# Patient Record
Sex: FEMALE | Race: OTHER | Employment: UNEMPLOYED
[De-identification: names, ages, dates, MRNs, and addresses within clinical notes are randomized per-mention and may not be internally consistent; named-entity substitution may affect disease eponyms.]

---

## 2017-01-31 RX ORDER — ALPRAZOLAM 0.25 MG/1
TABLET ORAL
Qty: 10 TABLET | Refills: 0 | Status: SHIPPED | OUTPATIENT
Start: 2017-01-31 | End: 2017-02-23 | Stop reason: SDUPTHER

## 2017-02-09 ENCOUNTER — TELEPHONE (OUTPATIENT)
Dept: INTERNAL MEDICINE | Facility: CLINIC | Age: 52
End: 2017-02-09

## 2017-02-09 ENCOUNTER — HOSPITAL ENCOUNTER (OUTPATIENT)
Dept: PAIN MANAGEMENT | Age: 52
Discharge: HOME | End: 2017-02-09
Payer: MEDICAID

## 2017-02-09 VITALS
TEMPERATURE: 98.1 F | DIASTOLIC BLOOD PRESSURE: 86 MMHG | WEIGHT: 207 LBS | BODY MASS INDEX: 36.68 KG/M2 | SYSTOLIC BLOOD PRESSURE: 156 MMHG | RESPIRATION RATE: 18 BRPM | HEIGHT: 63 IN | HEART RATE: 75 BPM

## 2017-02-09 DIAGNOSIS — G47.33 OSA (OBSTRUCTIVE SLEEP APNEA): Primary | ICD-10-CM

## 2017-02-09 DIAGNOSIS — G47.8 UNREFRESHED BY SLEEP: ICD-10-CM

## 2017-02-09 DIAGNOSIS — R53.83 FATIGUE, UNSPECIFIED TYPE: ICD-10-CM

## 2017-02-09 DIAGNOSIS — G89.4 PAIN SYNDROME, CHRONIC: Primary | ICD-10-CM

## 2017-02-09 PROCEDURE — 99214 OFFICE O/P EST MOD 30 MIN: CPT

## 2017-02-09 PROCEDURE — 80307 DRUG TEST PRSMV CHEM ANLYZR: CPT

## 2017-02-09 RX ORDER — GABAPENTIN 300 MG/1
300 CAPSULE ORAL 3 TIMES DAILY
Qty: 90 CAPSULE | Refills: 0
Start: 2017-02-19 | End: 2017-03-01 | Stop reason: SDUPTHER

## 2017-02-09 RX ORDER — GABAPENTIN 300 MG/1
300 CAPSULE ORAL 3 TIMES DAILY
Qty: 90 CAPSULE | Refills: 0 | Status: SHIPPED | OUTPATIENT
Start: 2017-02-09 | End: 2017-02-09 | Stop reason: SDUPTHER

## 2017-02-09 RX ORDER — GABAPENTIN 100 MG/1
300 CAPSULE ORAL EVERY 8 HOURS SCHEDULED
Qty: 90 CAPSULE | Refills: 0 | Status: SHIPPED | OUTPATIENT
Start: 2017-02-18 | End: 2017-02-09 | Stop reason: DRUGHIGH

## 2017-02-09 ASSESSMENT — PAIN SCALES - GENERAL: PAINLEVEL_OUTOF10: 9

## 2017-02-09 ASSESSMENT — PAIN DESCRIPTION - FREQUENCY: FREQUENCY: CONTINUOUS

## 2017-02-09 ASSESSMENT — PAIN DESCRIPTION - ORIENTATION: ORIENTATION: RIGHT;LEFT

## 2017-02-09 ASSESSMENT — PAIN DESCRIPTION - LOCATION: LOCATION: FOOT

## 2017-02-09 ASSESSMENT — PAIN DESCRIPTION - PROGRESSION: CLINICAL_PROGRESSION: NOT CHANGED

## 2017-02-09 ASSESSMENT — PAIN DESCRIPTION - PAIN TYPE: TYPE: CHRONIC PAIN

## 2017-02-11 LAB
6-ACETYLMORPHINE, UR: NOT DETECTED
7-AMINOCLONAZEPAM, URINE: NOT DETECTED
ALPHA-OH-ALPRAZ, URINE: NOT DETECTED
ALPRAZOLAM, URINE: NOT DETECTED
AMPHETAMINES, URINE: NOT DETECTED
BARBITURATES, URINE: NOT DETECTED
BENZOYLECGONINE, UR: NOT DETECTED
BUPRENORPHINE URINE: NOT DETECTED
CARISOPRODOL, UR: NOT DETECTED
CLONAZEPAM, URINE: NOT DETECTED
CODEINE, URINE: NOT DETECTED
CREATININE URINE: 21.1 MG/DL (ref 20–400)
DIAZEPAM, URINE: NOT DETECTED
EER PAIN MGT DRUG PANEL, HIGH RES/EMIT U: NORMAL
ETHYL GLUCURONIDE UR: PRESENT
FENTANYL URINE: NOT DETECTED
HYDROCODONE, URINE: NOT DETECTED
HYDROMORPHONE, URINE: NOT DETECTED
LORAZEPAM, URINE: NOT DETECTED
MARIJUANA METAB, UR: NOT DETECTED
MDA, UR: NOT DETECTED
MDEA, EVE, UR: NOT DETECTED
MDMA URINE: NOT DETECTED
MEPERIDINE METAB, UR: NOT DETECTED
METHADONE, URINE: NOT DETECTED
METHAMPHETAMINE, URINE: NOT DETECTED
METHYLPHENIDATE: NOT DETECTED
MIDAZOLAM, URINE: NOT DETECTED
MORPHINE URINE: NOT DETECTED
NORBUPRENORPHINE, URINE: NOT DETECTED
NORDIAZEPAM, URINE: NOT DETECTED
NORFENTANYL, URINE: NOT DETECTED
NORHYDROCODONE, URINE: NOT DETECTED
NOROXYCODONE, URINE: NOT DETECTED
NOROXYMORPHONE, URINE: NOT DETECTED
OXAZEPAM, URINE: NOT DETECTED
OXYCODONE URINE: NOT DETECTED
OXYMORPHONE, URINE: NOT DETECTED
PAIN MGT DRUG PANEL, HI RES, UR: NORMAL
PCP,URINE: NOT DETECTED
PHENTERMINE, UR: NOT DETECTED
PROPOXYPHENE, URINE: NOT DETECTED
TAPENTADOL, URINE: NOT DETECTED
TAPENTADOL-O-SULFATE, URINE: NOT DETECTED
TEMAZEPAM, URINE: NOT DETECTED
TRAMADOL, URINE: PRESENT
ZOLPIDEM, URINE: NOT DETECTED

## 2017-02-23 ENCOUNTER — HOSPITAL ENCOUNTER (OUTPATIENT)
Age: 52
Discharge: HOME | End: 2017-02-23
Payer: MEDICAID

## 2017-02-23 ENCOUNTER — OFFICE VISIT (OUTPATIENT)
Dept: INTERNAL MEDICINE | Facility: CLINIC | Age: 52
End: 2017-02-23

## 2017-02-23 VITALS
DIASTOLIC BLOOD PRESSURE: 70 MMHG | WEIGHT: 206 LBS | HEART RATE: 95 BPM | RESPIRATION RATE: 18 BRPM | SYSTOLIC BLOOD PRESSURE: 124 MMHG | BODY MASS INDEX: 36.49 KG/M2

## 2017-02-23 DIAGNOSIS — E11.9 TYPE 2 DIABETES MELLITUS WITHOUT COMPLICATION, WITHOUT LONG-TERM CURRENT USE OF INSULIN (HCC): Primary | ICD-10-CM

## 2017-02-23 DIAGNOSIS — E78.00 PURE HYPERCHOLESTEROLEMIA: ICD-10-CM

## 2017-02-23 DIAGNOSIS — F41.9 ANXIETY: ICD-10-CM

## 2017-02-23 DIAGNOSIS — G89.4 PAIN SYNDROME, CHRONIC: ICD-10-CM

## 2017-02-23 PROCEDURE — 99213 OFFICE O/P EST LOW 20 MIN: CPT | Performed by: INTERNAL MEDICINE

## 2017-02-23 RX ORDER — HYDROCHLOROTHIAZIDE 25 MG/1
25 TABLET ORAL DAILY
Qty: 30 TABLET | Refills: 8 | Status: SHIPPED | OUTPATIENT
Start: 2017-02-23 | End: 2017-07-06 | Stop reason: SDUPTHER

## 2017-02-23 RX ORDER — ALPRAZOLAM 0.25 MG/1
TABLET ORAL
Qty: 10 TABLET | Refills: 0 | Status: SHIPPED | OUTPATIENT
Start: 2017-02-23 | End: 2017-04-04 | Stop reason: SDUPTHER

## 2017-02-23 ASSESSMENT — ENCOUNTER SYMPTOMS
RESPIRATORY NEGATIVE: 1
EYES NEGATIVE: 1
ALLERGIC/IMMUNOLOGIC NEGATIVE: 1
GASTROINTESTINAL NEGATIVE: 1

## 2017-03-01 ENCOUNTER — HOSPITAL ENCOUNTER (OUTPATIENT)
Dept: PAIN MANAGEMENT | Age: 52
Discharge: HOME | End: 2017-03-01
Payer: COMMERCIAL

## 2017-03-01 VITALS
RESPIRATION RATE: 16 BRPM | DIASTOLIC BLOOD PRESSURE: 89 MMHG | TEMPERATURE: 98.4 F | HEART RATE: 79 BPM | SYSTOLIC BLOOD PRESSURE: 139 MMHG

## 2017-03-01 DIAGNOSIS — E11.42 DIABETIC POLYNEUROPATHY ASSOCIATED WITH TYPE 2 DIABETES MELLITUS (HCC): ICD-10-CM

## 2017-03-01 DIAGNOSIS — G89.4 PAIN SYNDROME, CHRONIC: ICD-10-CM

## 2017-03-01 PROCEDURE — 99214 OFFICE O/P EST MOD 30 MIN: CPT

## 2017-03-01 RX ORDER — MELOXICAM 7.5 MG/1
7.5 TABLET ORAL 2 TIMES DAILY
Qty: 60 TABLET | Refills: 0 | Status: SHIPPED | OUTPATIENT
Start: 2017-03-01 | End: 2017-05-02 | Stop reason: SDUPTHER

## 2017-03-01 RX ORDER — TRAMADOL HYDROCHLORIDE 50 MG/1
50 TABLET ORAL EVERY 8 HOURS PRN
Qty: 42 TABLET | Refills: 0 | Status: SHIPPED | OUTPATIENT
Start: 2017-03-01 | End: 2017-03-31 | Stop reason: SDUPTHER

## 2017-03-01 RX ORDER — GABAPENTIN 300 MG/1
300 CAPSULE ORAL 3 TIMES DAILY
Qty: 90 CAPSULE | Refills: 0 | Status: SHIPPED | OUTPATIENT
Start: 2017-03-01 | End: 2017-05-02 | Stop reason: DRUGHIGH

## 2017-03-01 ASSESSMENT — PAIN SCALES - GENERAL: PAINLEVEL_OUTOF10: 7

## 2017-03-01 ASSESSMENT — PAIN DESCRIPTION - FREQUENCY: FREQUENCY: CONTINUOUS

## 2017-03-01 ASSESSMENT — PAIN DESCRIPTION - ORIENTATION: ORIENTATION: RIGHT;LEFT

## 2017-03-01 ASSESSMENT — PAIN DESCRIPTION - PAIN TYPE: TYPE: CHRONIC PAIN

## 2017-03-01 ASSESSMENT — PAIN DESCRIPTION - PROGRESSION: CLINICAL_PROGRESSION: NOT CHANGED

## 2017-03-01 ASSESSMENT — PAIN DESCRIPTION - LOCATION: LOCATION: FOOT

## 2017-03-01 ASSESSMENT — PAIN DESCRIPTION - ONSET: ONSET: ON-GOING

## 2017-03-07 RX ORDER — ALPRAZOLAM 0.25 MG/1
TABLET ORAL
Qty: 10 TABLET | Refills: 0 | OUTPATIENT
Start: 2017-03-07

## 2017-03-13 ENCOUNTER — TELEPHONE (OUTPATIENT)
Dept: INTERNAL MEDICINE | Age: 52
End: 2017-03-13

## 2017-03-13 ENCOUNTER — HOSPITAL ENCOUNTER (OUTPATIENT)
Dept: NEUROLOGY | Age: 52
Discharge: HOME | End: 2017-03-13
Payer: COMMERCIAL

## 2017-03-13 DIAGNOSIS — E11.42 DIABETIC POLYNEUROPATHY ASSOCIATED WITH TYPE 2 DIABETES MELLITUS (HCC): Primary | ICD-10-CM

## 2017-03-13 DIAGNOSIS — E11.42 DIABETIC POLYNEUROPATHY ASSOCIATED WITH TYPE 2 DIABETES MELLITUS (HCC): ICD-10-CM

## 2017-03-13 PROCEDURE — 95912 NRV CNDJ TEST 11-12 STUDIES: CPT | Performed by: PHYSICAL MEDICINE & REHABILITATION

## 2017-03-31 ENCOUNTER — HOSPITAL ENCOUNTER (OUTPATIENT)
Dept: PAIN MANAGEMENT | Age: 52
Discharge: HOME | End: 2017-03-31
Payer: COMMERCIAL

## 2017-03-31 DIAGNOSIS — G89.4 PAIN SYNDROME, CHRONIC: ICD-10-CM

## 2017-03-31 DIAGNOSIS — E11.42 DIABETIC POLYNEUROPATHY ASSOCIATED WITH TYPE 2 DIABETES MELLITUS (HCC): Primary | ICD-10-CM

## 2017-03-31 PROCEDURE — 99213 OFFICE O/P EST LOW 20 MIN: CPT

## 2017-03-31 PROCEDURE — 99214 OFFICE O/P EST MOD 30 MIN: CPT

## 2017-03-31 RX ORDER — OMEGA-3 FATTY ACIDS/FISH OIL 300-1000MG
1 CAPSULE ORAL DAILY PRN
Qty: 120 CAPSULE | Refills: 3 | Status: SHIPPED | OUTPATIENT
Start: 2017-03-31 | End: 2017-05-02 | Stop reason: SDUPTHER

## 2017-03-31 RX ORDER — TRAMADOL HYDROCHLORIDE 50 MG/1
50 TABLET ORAL EVERY 8 HOURS PRN
Qty: 42 TABLET | Refills: 0 | Status: SHIPPED | OUTPATIENT
Start: 2017-03-31 | End: 2017-03-31 | Stop reason: SDUPTHER

## 2017-03-31 RX ORDER — GABAPENTIN 400 MG/1
400 CAPSULE ORAL 3 TIMES DAILY
Qty: 90 CAPSULE | Refills: 0 | Status: SHIPPED | OUTPATIENT
Start: 2017-03-31 | End: 2017-05-02 | Stop reason: SDUPTHER

## 2017-03-31 RX ORDER — TRAMADOL HYDROCHLORIDE 50 MG/1
50 TABLET ORAL EVERY 8 HOURS PRN
Qty: 42 TABLET | Refills: 0 | Status: SHIPPED | OUTPATIENT
Start: 2017-04-14 | End: 2017-05-08 | Stop reason: SDUPTHER

## 2017-03-31 ASSESSMENT — ENCOUNTER SYMPTOMS
COUGH: 0
SHORTNESS OF BREATH: 0
CONSTIPATION: 0

## 2017-04-05 RX ORDER — ALPRAZOLAM 0.25 MG/1
TABLET ORAL
Qty: 10 TABLET | Refills: 0 | Status: SHIPPED | OUTPATIENT
Start: 2017-04-05 | End: 2017-05-02 | Stop reason: SDUPTHER

## 2017-04-25 ENCOUNTER — HOSPITAL ENCOUNTER (OUTPATIENT)
Dept: PAIN MANAGEMENT | Age: 52
Discharge: HOME | End: 2017-04-25
Payer: COMMERCIAL

## 2017-04-25 VITALS
RESPIRATION RATE: 16 BRPM | DIASTOLIC BLOOD PRESSURE: 95 MMHG | SYSTOLIC BLOOD PRESSURE: 153 MMHG | BODY MASS INDEX: 36.32 KG/M2 | WEIGHT: 205 LBS | HEART RATE: 78 BPM | TEMPERATURE: 98 F | HEIGHT: 63 IN

## 2017-04-25 PROCEDURE — 99214 OFFICE O/P EST MOD 30 MIN: CPT

## 2017-04-25 PROCEDURE — 99213 OFFICE O/P EST LOW 20 MIN: CPT

## 2017-04-25 ASSESSMENT — PAIN DESCRIPTION - PROGRESSION: CLINICAL_PROGRESSION: NOT CHANGED

## 2017-04-25 ASSESSMENT — PAIN SCALES - GENERAL: PAINLEVEL_OUTOF10: 8

## 2017-04-25 ASSESSMENT — PAIN DESCRIPTION - LOCATION: LOCATION: FOOT;HAND

## 2017-04-25 ASSESSMENT — PAIN DESCRIPTION - ORIENTATION: ORIENTATION: RIGHT;LEFT

## 2017-04-25 ASSESSMENT — PAIN DESCRIPTION - FREQUENCY: FREQUENCY: CONTINUOUS

## 2017-04-25 ASSESSMENT — PAIN DESCRIPTION - PAIN TYPE: TYPE: CHRONIC PAIN

## 2017-05-02 DIAGNOSIS — G89.4 PAIN SYNDROME, CHRONIC: ICD-10-CM

## 2017-05-02 RX ORDER — GABAPENTIN 300 MG/1
300 CAPSULE ORAL 3 TIMES DAILY
Qty: 90 CAPSULE | Refills: 0 | Status: CANCELLED | OUTPATIENT
Start: 2017-05-02 | End: 2017-06-01

## 2017-05-04 RX ORDER — GABAPENTIN 400 MG/1
400 CAPSULE ORAL 3 TIMES DAILY
Qty: 90 CAPSULE | Refills: 0 | Status: SHIPPED | OUTPATIENT
Start: 2017-05-04 | End: 2017-05-26 | Stop reason: SDUPTHER

## 2017-05-04 RX ORDER — OMEGA-3 FATTY ACIDS/FISH OIL 300-1000MG
1 CAPSULE ORAL DAILY PRN
Qty: 120 CAPSULE | Refills: 3 | Status: SHIPPED | OUTPATIENT
Start: 2017-05-04 | End: 2017-06-20

## 2017-05-04 RX ORDER — MELOXICAM 7.5 MG/1
7.5 TABLET ORAL 2 TIMES DAILY
Qty: 60 TABLET | Refills: 0 | Status: SHIPPED | OUTPATIENT
Start: 2017-05-04 | End: 2017-05-26

## 2017-05-04 RX ORDER — ALPRAZOLAM 0.25 MG/1
TABLET ORAL
Qty: 10 TABLET | Refills: 0 | Status: SHIPPED | OUTPATIENT
Start: 2017-05-04 | End: 2017-06-06 | Stop reason: SDUPTHER

## 2017-05-15 ENCOUNTER — TELEPHONE (OUTPATIENT)
Dept: INTERNAL MEDICINE | Age: 52
End: 2017-05-15

## 2017-05-16 RX ORDER — BENZONATATE 100 MG/1
100 CAPSULE ORAL 3 TIMES DAILY PRN
Qty: 25 CAPSULE | Refills: 1 | Status: SHIPPED | OUTPATIENT
Start: 2017-05-16 | End: 2017-05-23

## 2017-05-19 ENCOUNTER — HOSPITAL ENCOUNTER (EMERGENCY)
Age: 52
Discharge: HOME | End: 2017-05-19
Payer: COMMERCIAL

## 2017-05-19 ENCOUNTER — APPOINTMENT (OUTPATIENT)
Dept: GENERAL RADIOLOGY | Age: 52
End: 2017-05-19
Payer: COMMERCIAL

## 2017-05-19 VITALS
SYSTOLIC BLOOD PRESSURE: 151 MMHG | HEIGHT: 64 IN | TEMPERATURE: 99.8 F | WEIGHT: 220 LBS | BODY MASS INDEX: 37.56 KG/M2 | DIASTOLIC BLOOD PRESSURE: 82 MMHG | HEART RATE: 87 BPM | RESPIRATION RATE: 20 BRPM | OXYGEN SATURATION: 98 %

## 2017-05-19 DIAGNOSIS — J20.9 BRONCHITIS WITH BRONCHOSPASM: Primary | ICD-10-CM

## 2017-05-19 PROCEDURE — 94640 AIRWAY INHALATION TREATMENT: CPT

## 2017-05-19 PROCEDURE — 71020 XR CHEST STANDARD TWO VW: CPT

## 2017-05-19 PROCEDURE — 6360000002 HC RX W HCPCS

## 2017-05-19 PROCEDURE — 99285 EMERGENCY DEPT VISIT HI MDM: CPT

## 2017-05-19 RX ORDER — ALBUTEROL SULFATE 90 UG/1
2 AEROSOL, METERED RESPIRATORY (INHALATION)
Status: DISCONTINUED | OUTPATIENT
Start: 2017-05-19 | End: 2017-05-19 | Stop reason: HOSPADM

## 2017-05-19 RX ORDER — PREDNISONE 20 MG/1
20 TABLET ORAL 2 TIMES DAILY
Qty: 10 TABLET | Refills: 0 | Status: SHIPPED | OUTPATIENT
Start: 2017-05-19 | End: 2017-05-24

## 2017-05-19 RX ORDER — DEXTROMETHORPHAN HYDROBROMIDE AND PROMETHAZINE HYDROCHLORIDE 15; 6.25 MG/5ML; MG/5ML
5 SYRUP ORAL 4 TIMES DAILY PRN
Qty: 180 ML | Refills: 0 | Status: SHIPPED | OUTPATIENT
Start: 2017-05-19 | End: 2017-05-26

## 2017-05-19 RX ORDER — AZITHROMYCIN 250 MG/1
TABLET, FILM COATED ORAL
Qty: 1 PACKET | Refills: 0 | Status: SHIPPED | OUTPATIENT
Start: 2017-05-19 | End: 2017-05-29

## 2017-05-19 RX ORDER — IPRATROPIUM BROMIDE AND ALBUTEROL SULFATE 2.5; .5 MG/3ML; MG/3ML
1 SOLUTION RESPIRATORY (INHALATION)
Status: DISCONTINUED | OUTPATIENT
Start: 2017-05-19 | End: 2017-05-19 | Stop reason: HOSPADM

## 2017-05-19 RX ORDER — ALBUTEROL SULFATE 2.5 MG/3ML
5 SOLUTION RESPIRATORY (INHALATION)
Status: DISCONTINUED | OUTPATIENT
Start: 2017-05-19 | End: 2017-05-19 | Stop reason: HOSPADM

## 2017-05-19 RX ADMIN — ALBUTEROL SULFATE 5 MG: 5 SOLUTION RESPIRATORY (INHALATION) at 08:47

## 2017-05-19 ASSESSMENT — ENCOUNTER SYMPTOMS
GASTROINTESTINAL NEGATIVE: 1
SHORTNESS OF BREATH: 1
SORE THROAT: 1
SINUS PRESSURE: 1
COUGH: 1

## 2017-05-19 ASSESSMENT — PAIN SCALES - GENERAL: PAINLEVEL_OUTOF10: 10

## 2017-05-25 ENCOUNTER — OFFICE VISIT (OUTPATIENT)
Dept: INTERNAL MEDICINE | Age: 52
End: 2017-05-25
Payer: COMMERCIAL

## 2017-05-25 ENCOUNTER — OFFICE VISIT (OUTPATIENT)
Dept: PODIATRY | Age: 52
End: 2017-05-25
Payer: COMMERCIAL

## 2017-05-25 VITALS
WEIGHT: 212 LBS | DIASTOLIC BLOOD PRESSURE: 95 MMHG | TEMPERATURE: 98.2 F | BODY MASS INDEX: 36.39 KG/M2 | RESPIRATION RATE: 20 BRPM | HEART RATE: 91 BPM | SYSTOLIC BLOOD PRESSURE: 165 MMHG

## 2017-05-25 VITALS
SYSTOLIC BLOOD PRESSURE: 111 MMHG | WEIGHT: 190 LBS | DIASTOLIC BLOOD PRESSURE: 74 MMHG | BODY MASS INDEX: 32.44 KG/M2 | HEIGHT: 64 IN | HEART RATE: 91 BPM

## 2017-05-25 DIAGNOSIS — E11.9 TYPE 2 DIABETES MELLITUS WITHOUT COMPLICATION, WITHOUT LONG-TERM CURRENT USE OF INSULIN (HCC): Primary | ICD-10-CM

## 2017-05-25 DIAGNOSIS — M67.449 MUCOUS CYST OF FINGER: ICD-10-CM

## 2017-05-25 DIAGNOSIS — B35.1 ONYCHOMYCOSIS OF TOENAIL: Primary | ICD-10-CM

## 2017-05-25 DIAGNOSIS — E11.9 TYPE 2 DIABETES MELLITUS WITHOUT COMPLICATION, WITHOUT LONG-TERM CURRENT USE OF INSULIN (HCC): ICD-10-CM

## 2017-05-25 DIAGNOSIS — J41.0 SIMPLE CHRONIC BRONCHITIS (HCC): ICD-10-CM

## 2017-05-25 DIAGNOSIS — E78.00 PURE HYPERCHOLESTEROLEMIA: ICD-10-CM

## 2017-05-25 DIAGNOSIS — J20.8 ACUTE BRONCHITIS DUE TO OTHER SPECIFIED ORGANISMS: ICD-10-CM

## 2017-05-25 DIAGNOSIS — G89.4 PAIN SYNDROME, CHRONIC: ICD-10-CM

## 2017-05-25 DIAGNOSIS — M79.674 PAIN OF TOES OF BOTH FEET: ICD-10-CM

## 2017-05-25 DIAGNOSIS — M79.675 PAIN OF TOES OF BOTH FEET: ICD-10-CM

## 2017-05-25 PROCEDURE — 99203 OFFICE O/P NEW LOW 30 MIN: CPT | Performed by: PODIATRIST

## 2017-05-25 PROCEDURE — 99214 OFFICE O/P EST MOD 30 MIN: CPT | Performed by: INTERNAL MEDICINE

## 2017-05-25 PROCEDURE — 11721 DEBRIDE NAIL 6 OR MORE: CPT | Performed by: PODIATRIST

## 2017-05-25 RX ORDER — AMLODIPINE BESYLATE 5 MG/1
5 TABLET ORAL DAILY
Qty: 30 TABLET | Refills: 6 | Status: SHIPPED | OUTPATIENT
Start: 2017-05-25 | End: 2017-11-30 | Stop reason: SDUPTHER

## 2017-05-25 RX ORDER — VARENICLINE TARTRATE 1 MG/1
1 TABLET, FILM COATED ORAL 2 TIMES DAILY
Qty: 60 TABLET | Refills: 3 | Status: SHIPPED | OUTPATIENT
Start: 2017-05-25 | End: 2017-11-30

## 2017-05-25 RX ORDER — GUAIFENESIN 600 MG/1
600 TABLET, EXTENDED RELEASE ORAL 2 TIMES DAILY
Qty: 25 TABLET | Refills: 0 | Status: SHIPPED | OUTPATIENT
Start: 2017-05-25 | End: 2017-07-14 | Stop reason: ALTCHOICE

## 2017-05-25 RX ORDER — VARENICLINE TARTRATE 25 MG
KIT ORAL
Qty: 1 EACH | Refills: 0 | Status: SHIPPED | OUTPATIENT
Start: 2017-05-25 | End: 2017-11-30 | Stop reason: SDUPTHER

## 2017-05-25 ASSESSMENT — ENCOUNTER SYMPTOMS
ALLERGIC/IMMUNOLOGIC NEGATIVE: 1
BACK PAIN: 0
COLOR CHANGE: 0
DIARRHEA: 0
SHORTNESS OF BREATH: 0
GASTROINTESTINAL NEGATIVE: 1
SHORTNESS OF BREATH: 1
NAUSEA: 0
COUGH: 1
BACK PAIN: 1

## 2017-05-26 ENCOUNTER — HOSPITAL ENCOUNTER (OUTPATIENT)
Dept: PAIN MANAGEMENT | Age: 52
Discharge: HOME | End: 2017-05-26
Payer: COMMERCIAL

## 2017-05-26 VITALS
DIASTOLIC BLOOD PRESSURE: 89 MMHG | HEIGHT: 64 IN | TEMPERATURE: 98 F | WEIGHT: 190 LBS | BODY MASS INDEX: 32.44 KG/M2 | RESPIRATION RATE: 16 BRPM | SYSTOLIC BLOOD PRESSURE: 134 MMHG | HEART RATE: 83 BPM

## 2017-05-26 PROCEDURE — 99214 OFFICE O/P EST MOD 30 MIN: CPT

## 2017-05-26 PROCEDURE — 99213 OFFICE O/P EST LOW 20 MIN: CPT

## 2017-05-26 ASSESSMENT — ENCOUNTER SYMPTOMS
SUSPICIOUS LESIONS: 0
EYE DISCHARGE: 0
BLURRED VISION: 0
UNUSUAL HAIR DISTRIBUTION: 0

## 2017-05-30 RX ORDER — GABAPENTIN 400 MG/1
400 CAPSULE ORAL 3 TIMES DAILY
Qty: 90 CAPSULE | Refills: 0 | Status: SHIPPED | OUTPATIENT
Start: 2017-05-30 | End: 2017-06-20 | Stop reason: SDUPTHER

## 2017-06-07 RX ORDER — ALPRAZOLAM 0.25 MG/1
TABLET ORAL
Qty: 10 TABLET | Refills: 0 | Status: SHIPPED | OUTPATIENT
Start: 2017-06-07 | End: 2017-07-06 | Stop reason: SDUPTHER

## 2017-06-12 DIAGNOSIS — R29.898 SMALL FINGER: Primary | ICD-10-CM

## 2017-06-12 DIAGNOSIS — M67.441 DIGITAL MUCOUS CYST OF FINGER OF RIGHT HAND: ICD-10-CM

## 2017-06-14 ENCOUNTER — OFFICE VISIT (OUTPATIENT)
Dept: ORTHOPEDIC SURGERY | Age: 52
End: 2017-06-14
Payer: COMMERCIAL

## 2017-06-14 VITALS — BODY MASS INDEX: 32.44 KG/M2 | WEIGHT: 190.04 LBS | HEIGHT: 64 IN

## 2017-06-14 DIAGNOSIS — M15.1 HEBERDEN NODE: Primary | ICD-10-CM

## 2017-06-14 DIAGNOSIS — M19.142 POST-TRAUMATIC OSTEOARTHRITIS OF LEFT HAND: ICD-10-CM

## 2017-06-14 DIAGNOSIS — M67.40 MUCOID CYST OF JOINT: ICD-10-CM

## 2017-06-14 PROCEDURE — 99203 OFFICE O/P NEW LOW 30 MIN: CPT | Performed by: STUDENT IN AN ORGANIZED HEALTH CARE EDUCATION/TRAINING PROGRAM

## 2017-06-14 ASSESSMENT — ENCOUNTER SYMPTOMS
EYE PAIN: 0
WHEEZING: 0
ABDOMINAL DISTENTION: 0
VOMITING: 0
ABDOMINAL PAIN: 0
NAUSEA: 0
COLOR CHANGE: 0
COUGH: 0
SHORTNESS OF BREATH: 0

## 2017-06-15 RX ORDER — TRAMADOL HYDROCHLORIDE 50 MG/1
50 TABLET ORAL EVERY 8 HOURS PRN
Qty: 42 TABLET | Refills: 0 | OUTPATIENT
Start: 2017-06-15 | End: 2017-06-29

## 2017-06-20 ENCOUNTER — HOSPITAL ENCOUNTER (OUTPATIENT)
Age: 52
Setting detail: SPECIMEN
Discharge: HOME | End: 2017-06-20
Payer: COMMERCIAL

## 2017-06-20 ENCOUNTER — OFFICE VISIT (OUTPATIENT)
Dept: INTERNAL MEDICINE | Age: 52
End: 2017-06-20
Payer: COMMERCIAL

## 2017-06-20 VITALS
HEIGHT: 64 IN | WEIGHT: 202.2 LBS | DIASTOLIC BLOOD PRESSURE: 91 MMHG | TEMPERATURE: 98.6 F | SYSTOLIC BLOOD PRESSURE: 146 MMHG | BODY MASS INDEX: 34.52 KG/M2 | HEART RATE: 87 BPM

## 2017-06-20 DIAGNOSIS — E78.00 PURE HYPERCHOLESTEROLEMIA: ICD-10-CM

## 2017-06-20 DIAGNOSIS — J18.9 PNEUMONIA OF RIGHT MIDDLE LOBE DUE TO INFECTIOUS ORGANISM: ICD-10-CM

## 2017-06-20 DIAGNOSIS — G89.29 CHRONIC PAIN OF RIGHT ANKLE: Primary | ICD-10-CM

## 2017-06-20 DIAGNOSIS — E11.9 TYPE 2 DIABETES MELLITUS WITHOUT COMPLICATION, WITHOUT LONG-TERM CURRENT USE OF INSULIN (HCC): ICD-10-CM

## 2017-06-20 DIAGNOSIS — M25.571 CHRONIC PAIN OF RIGHT ANKLE: Primary | ICD-10-CM

## 2017-06-20 LAB — RHEUMATOID FACTOR: <10 IU/ML

## 2017-06-20 PROCEDURE — 86038 ANTINUCLEAR ANTIBODIES: CPT

## 2017-06-20 PROCEDURE — 36415 COLL VENOUS BLD VENIPUNCTURE: CPT

## 2017-06-20 PROCEDURE — 86431 RHEUMATOID FACTOR QUANT: CPT

## 2017-06-20 PROCEDURE — 99214 OFFICE O/P EST MOD 30 MIN: CPT | Performed by: INTERNAL MEDICINE

## 2017-06-20 RX ORDER — AZITHROMYCIN 250 MG/1
TABLET, FILM COATED ORAL
Qty: 1 PACKET | Refills: 0 | Status: SHIPPED | OUTPATIENT
Start: 2017-06-20 | End: 2017-06-30

## 2017-06-20 RX ORDER — CEPHALEXIN 500 MG/1
500 CAPSULE ORAL
COMMUNITY
Start: 2017-06-16 | End: 2017-06-26

## 2017-06-20 RX ORDER — GUAIFENESIN/DEXTROMETHORPHAN 100-10MG/5
5 SYRUP ORAL 3 TIMES DAILY PRN
Qty: 120 ML | Refills: 0 | Status: SHIPPED | OUTPATIENT
Start: 2017-06-20 | End: 2017-06-30

## 2017-06-20 RX ORDER — GABAPENTIN 400 MG/1
400 CAPSULE ORAL 3 TIMES DAILY
Qty: 90 CAPSULE | Refills: 0 | Status: SHIPPED | OUTPATIENT
Start: 2017-06-20 | End: 2017-07-14 | Stop reason: SDUPTHER

## 2017-06-20 ASSESSMENT — ENCOUNTER SYMPTOMS
GASTROINTESTINAL NEGATIVE: 1
EYES NEGATIVE: 1
ALLERGIC/IMMUNOLOGIC NEGATIVE: 1
SHORTNESS OF BREATH: 1
COUGH: 1

## 2017-06-21 LAB — ANTI-NUCLEAR ANTIBODY (ANA): NEGATIVE

## 2017-06-29 ENCOUNTER — TELEPHONE (OUTPATIENT)
Dept: INTERNAL MEDICINE | Age: 52
End: 2017-06-29

## 2017-06-29 DIAGNOSIS — E11.42 DIABETIC POLYNEUROPATHY ASSOCIATED WITH TYPE 2 DIABETES MELLITUS (HCC): ICD-10-CM

## 2017-06-29 DIAGNOSIS — E11.9 TYPE 2 DIABETES MELLITUS WITHOUT COMPLICATION, WITHOUT LONG-TERM CURRENT USE OF INSULIN (HCC): Primary | ICD-10-CM

## 2017-07-07 RX ORDER — ALPRAZOLAM 0.25 MG/1
TABLET ORAL
Qty: 10 TABLET | Refills: 0 | Status: SHIPPED | OUTPATIENT
Start: 2017-07-07 | End: 2017-08-14 | Stop reason: SDUPTHER

## 2017-07-07 RX ORDER — HYDROCHLOROTHIAZIDE 25 MG/1
25 TABLET ORAL DAILY
Qty: 30 TABLET | Refills: 3 | Status: SHIPPED | OUTPATIENT
Start: 2017-07-07 | End: 2017-11-30 | Stop reason: SDUPTHER

## 2017-07-14 ENCOUNTER — OFFICE VISIT (OUTPATIENT)
Dept: INTERNAL MEDICINE | Age: 52
End: 2017-07-14
Payer: COMMERCIAL

## 2017-07-14 ENCOUNTER — HOSPITAL ENCOUNTER (OUTPATIENT)
Age: 52
Setting detail: SPECIMEN
Discharge: HOME | End: 2017-07-14
Payer: COMMERCIAL

## 2017-07-14 VITALS
BODY MASS INDEX: 37.25 KG/M2 | DIASTOLIC BLOOD PRESSURE: 86 MMHG | SYSTOLIC BLOOD PRESSURE: 143 MMHG | WEIGHT: 218.2 LBS | HEIGHT: 64 IN | HEART RATE: 92 BPM

## 2017-07-14 DIAGNOSIS — Z13.9 SCREENING: ICD-10-CM

## 2017-07-14 DIAGNOSIS — Z23 NEED FOR PNEUMOCOCCAL VACCINATION: ICD-10-CM

## 2017-07-14 DIAGNOSIS — Z12.31 SCREENING MAMMOGRAM, ENCOUNTER FOR: ICD-10-CM

## 2017-07-14 DIAGNOSIS — E78.00 PURE HYPERCHOLESTEROLEMIA: ICD-10-CM

## 2017-07-14 DIAGNOSIS — E11.9 TYPE 2 DIABETES MELLITUS WITHOUT COMPLICATION, WITHOUT LONG-TERM CURRENT USE OF INSULIN (HCC): Primary | ICD-10-CM

## 2017-07-14 DIAGNOSIS — E11.9 TYPE 2 DIABETES MELLITUS WITHOUT COMPLICATION, WITHOUT LONG-TERM CURRENT USE OF INSULIN (HCC): ICD-10-CM

## 2017-07-14 DIAGNOSIS — G89.4 PAIN SYNDROME, CHRONIC: ICD-10-CM

## 2017-07-14 LAB
ANION GAP SERPL CALCULATED.3IONS-SCNC: 14 MMOL/L (ref 9–17)
BUN BLDV-MCNC: 16 MG/DL (ref 6–20)
BUN/CREAT BLD: ABNORMAL (ref 9–20)
CALCIUM SERPL-MCNC: 9.8 MG/DL (ref 8.6–10.4)
CHLORIDE BLD-SCNC: 98 MMOL/L (ref 98–107)
CO2: 27 MMOL/L (ref 20–31)
CONTROL: NORMAL
CREAT SERPL-MCNC: 0.85 MG/DL (ref 0.5–0.9)
ESTIMATED AVERAGE GLUCOSE: 171 MG/DL
GFR AFRICAN AMERICAN: >60 ML/MIN
GFR NON-AFRICAN AMERICAN: >60 ML/MIN
GFR SERPL CREATININE-BSD FRML MDRD: ABNORMAL ML/MIN/{1.73_M2}
GFR SERPL CREATININE-BSD FRML MDRD: ABNORMAL ML/MIN/{1.73_M2}
GLUCOSE BLD-MCNC: 148 MG/DL (ref 70–99)
HBA1C MFR BLD: 7.6 % (ref 4–6)
HEMOCCULT STL QL: NORMAL
POTASSIUM SERPL-SCNC: 4.4 MMOL/L (ref 3.7–5.3)
SODIUM BLD-SCNC: 139 MMOL/L (ref 135–144)

## 2017-07-14 PROCEDURE — 99214 OFFICE O/P EST MOD 30 MIN: CPT | Performed by: INTERNAL MEDICINE

## 2017-07-14 PROCEDURE — 80048 BASIC METABOLIC PNL TOTAL CA: CPT

## 2017-07-14 PROCEDURE — 83036 HEMOGLOBIN GLYCOSYLATED A1C: CPT

## 2017-07-14 PROCEDURE — 90471 IMMUNIZATION ADMIN: CPT | Performed by: INTERNAL MEDICINE

## 2017-07-14 PROCEDURE — 36415 COLL VENOUS BLD VENIPUNCTURE: CPT

## 2017-07-14 PROCEDURE — 90732 PPSV23 VACC 2 YRS+ SUBQ/IM: CPT | Performed by: INTERNAL MEDICINE

## 2017-07-14 RX ORDER — GABAPENTIN 300 MG/1
600 CAPSULE ORAL 3 TIMES DAILY
Qty: 180 CAPSULE | Refills: 3 | Status: SHIPPED | OUTPATIENT
Start: 2017-07-14 | End: 2017-09-12 | Stop reason: DRUGHIGH

## 2017-07-14 ASSESSMENT — ENCOUNTER SYMPTOMS
GASTROINTESTINAL NEGATIVE: 1
BACK PAIN: 1
RESPIRATORY NEGATIVE: 1
EYES NEGATIVE: 1
ALLERGIC/IMMUNOLOGIC NEGATIVE: 1

## 2017-07-19 DIAGNOSIS — Z12.11 COLON CANCER SCREENING: Primary | ICD-10-CM

## 2017-07-19 PROCEDURE — 82274 ASSAY TEST FOR BLOOD FECAL: CPT | Performed by: INTERNAL MEDICINE

## 2017-07-24 RX ORDER — GABAPENTIN 600 MG/1
600 TABLET ORAL 3 TIMES DAILY
Qty: 90 TABLET | Refills: 3 | Status: SHIPPED | OUTPATIENT
Start: 2017-07-24 | End: 2017-09-11 | Stop reason: SDUPTHER

## 2017-07-31 ENCOUNTER — NURSE ONLY (OUTPATIENT)
Dept: PODIATRY | Age: 52
End: 2017-07-31

## 2017-07-31 DIAGNOSIS — M21.969 FOOT DEFORMITY, UNSPECIFIED LATERALITY: ICD-10-CM

## 2017-07-31 DIAGNOSIS — E11.9 TYPE 2 DIABETES MELLITUS WITHOUT COMPLICATION, WITHOUT LONG-TERM CURRENT USE OF INSULIN (HCC): Primary | ICD-10-CM

## 2017-08-15 ENCOUNTER — PROCEDURE VISIT (OUTPATIENT)
Dept: PHYSICAL MEDICINE AND REHAB | Age: 52
End: 2017-08-15
Payer: COMMERCIAL

## 2017-08-15 DIAGNOSIS — G56.01 CARPAL TUNNEL SYNDROME OF RIGHT WRIST: Primary | ICD-10-CM

## 2017-08-15 PROCEDURE — 95910 NRV CNDJ TEST 7-8 STUDIES: CPT | Performed by: PHYSICAL MEDICINE & REHABILITATION

## 2017-08-15 PROCEDURE — 95886 MUSC TEST DONE W/N TEST COMP: CPT | Performed by: PHYSICAL MEDICINE & REHABILITATION

## 2017-08-15 RX ORDER — ALPRAZOLAM 0.25 MG/1
TABLET ORAL
Qty: 10 TABLET | Refills: 0 | Status: SHIPPED | OUTPATIENT
Start: 2017-08-15 | End: 2017-09-09 | Stop reason: SDUPTHER

## 2017-09-11 RX ORDER — GABAPENTIN 800 MG/1
800 TABLET ORAL 3 TIMES DAILY
Qty: 90 TABLET | Refills: 1 | Status: SHIPPED | OUTPATIENT
Start: 2017-09-11 | End: 2017-11-02 | Stop reason: SDUPTHER

## 2017-09-11 RX ORDER — ALPRAZOLAM 0.25 MG/1
TABLET ORAL
Qty: 10 TABLET | Refills: 0 | OUTPATIENT
Start: 2017-09-11 | End: 2017-10-21 | Stop reason: SDUPTHER

## 2017-09-26 ENCOUNTER — OFFICE VISIT (OUTPATIENT)
Dept: PODIATRY | Age: 52
End: 2017-09-26
Payer: COMMERCIAL

## 2017-09-26 VITALS
HEIGHT: 63 IN | WEIGHT: 207 LBS | HEART RATE: 79 BPM | BODY MASS INDEX: 36.68 KG/M2 | SYSTOLIC BLOOD PRESSURE: 142 MMHG | DIASTOLIC BLOOD PRESSURE: 90 MMHG

## 2017-09-26 DIAGNOSIS — M25.571 ACUTE RIGHT ANKLE PAIN: ICD-10-CM

## 2017-09-26 DIAGNOSIS — S93.421A SPRAIN, ANKLE JOINT, MEDIAL, RIGHT, INITIAL ENCOUNTER: ICD-10-CM

## 2017-09-26 DIAGNOSIS — R60.0 EDEMA OF RIGHT FOOT: ICD-10-CM

## 2017-09-26 DIAGNOSIS — M79.671 RIGHT FOOT PAIN: ICD-10-CM

## 2017-09-26 DIAGNOSIS — M21.969 FOOT DEFORMITY, UNSPECIFIED LATERALITY: ICD-10-CM

## 2017-09-26 DIAGNOSIS — M25.471 EDEMA OF RIGHT ANKLE: ICD-10-CM

## 2017-09-26 DIAGNOSIS — E11.9 TYPE 2 DIABETES MELLITUS WITHOUT COMPLICATION, WITHOUT LONG-TERM CURRENT USE OF INSULIN (HCC): Primary | ICD-10-CM

## 2017-09-26 DIAGNOSIS — S93.601A SPRAIN OF FOOT, RIGHT, INITIAL ENCOUNTER: ICD-10-CM

## 2017-09-26 PROCEDURE — L4360 PNEUMAT WALKING BOOT PRE CST: HCPCS | Performed by: PODIATRIST

## 2017-09-26 PROCEDURE — A5513 MULTI DEN INSERT CUSTOM MOLD: HCPCS | Performed by: PODIATRIST

## 2017-09-26 PROCEDURE — 73620 X-RAY EXAM OF FOOT: CPT | Performed by: PODIATRIST

## 2017-09-26 PROCEDURE — A5500 DIAB SHOE FOR DENSITY INSERT: HCPCS | Performed by: PODIATRIST

## 2017-09-26 PROCEDURE — 99213 OFFICE O/P EST LOW 20 MIN: CPT | Performed by: PODIATRIST

## 2017-09-26 PROCEDURE — 73610 X-RAY EXAM OF ANKLE: CPT | Performed by: PODIATRIST

## 2017-09-26 RX ORDER — IBUPROFEN 800 MG/1
TABLET ORAL
COMMUNITY
Start: 2017-09-08 | End: 2017-11-30 | Stop reason: ALTCHOICE

## 2017-09-26 ASSESSMENT — ENCOUNTER SYMPTOMS
BACK PAIN: 0
SHORTNESS OF BREATH: 0
DIARRHEA: 0
COLOR CHANGE: 0
NAUSEA: 0

## 2017-09-27 ENCOUNTER — HOSPITAL ENCOUNTER (OUTPATIENT)
Age: 52
Setting detail: SPECIMEN
Discharge: HOME | End: 2017-09-27
Payer: COMMERCIAL

## 2017-09-27 DIAGNOSIS — E11.9 TYPE 2 DIABETES MELLITUS WITHOUT COMPLICATION, WITHOUT LONG-TERM CURRENT USE OF INSULIN (HCC): ICD-10-CM

## 2017-09-27 DIAGNOSIS — E78.00 PURE HYPERCHOLESTEROLEMIA: ICD-10-CM

## 2017-09-27 LAB
CHOLESTEROL/HDL RATIO: 8.4
CHOLESTEROL: 252 MG/DL
ESTIMATED AVERAGE GLUCOSE: 157 MG/DL
HBA1C MFR BLD: 7.1 % (ref 4–6)
HDLC SERPL-MCNC: 30 MG/DL
LDL CHOLESTEROL DIRECT: 128 MG/DL
LDL CHOLESTEROL: ABNORMAL MG/DL (ref 0–130)
TRIGL SERPL-MCNC: 538 MG/DL
VLDLC SERPL CALC-MCNC: ABNORMAL MG/DL (ref 1–30)

## 2017-09-27 PROCEDURE — 36415 COLL VENOUS BLD VENIPUNCTURE: CPT

## 2017-09-27 PROCEDURE — 83036 HEMOGLOBIN GLYCOSYLATED A1C: CPT

## 2017-09-27 PROCEDURE — 80061 LIPID PANEL: CPT

## 2017-09-27 PROCEDURE — 83721 ASSAY OF BLOOD LIPOPROTEIN: CPT

## 2017-10-03 ENCOUNTER — TELEPHONE (OUTPATIENT)
Dept: INTERNAL MEDICINE | Age: 52
End: 2017-10-03

## 2017-10-05 ENCOUNTER — OFFICE VISIT (OUTPATIENT)
Dept: PODIATRY | Age: 52
End: 2017-10-05
Payer: COMMERCIAL

## 2017-10-05 VITALS
HEART RATE: 85 BPM | BODY MASS INDEX: 36.68 KG/M2 | DIASTOLIC BLOOD PRESSURE: 67 MMHG | WEIGHT: 207 LBS | SYSTOLIC BLOOD PRESSURE: 112 MMHG | HEIGHT: 63 IN

## 2017-10-05 DIAGNOSIS — M76.821 POSTERIOR TIBIAL TENDINITIS, RIGHT: ICD-10-CM

## 2017-10-05 DIAGNOSIS — S93.601D SPRAIN OF FOOT, RIGHT, SUBSEQUENT ENCOUNTER: ICD-10-CM

## 2017-10-05 DIAGNOSIS — M25.471 EDEMA OF RIGHT ANKLE: ICD-10-CM

## 2017-10-05 DIAGNOSIS — M79.671 RIGHT FOOT PAIN: ICD-10-CM

## 2017-10-05 DIAGNOSIS — M25.571 ACUTE RIGHT ANKLE PAIN: ICD-10-CM

## 2017-10-05 DIAGNOSIS — R60.0 EDEMA OF RIGHT FOOT: ICD-10-CM

## 2017-10-05 DIAGNOSIS — M66.871 TIBIALIS POSTERIOR TENDON TEAR, NONTRAUMATIC, RIGHT: Primary | ICD-10-CM

## 2017-10-05 DIAGNOSIS — S93.421D: ICD-10-CM

## 2017-10-05 PROCEDURE — 99213 OFFICE O/P EST LOW 20 MIN: CPT | Performed by: PODIATRIST

## 2017-10-05 NOTE — MR AVS SNAPSHOT
After Visit Summary             Shad Jackson   10/5/2017 3:15 PM   Office Visit    Description:  Female : 1965   Provider:  Joselo Alston DPM   Department:  [facility]              Your Follow-Up and Future Appointments         Below is a list of your follow-up and future appointments. This may not be a complete list as you may have made appointments directly with providers that we are not aware of or your providers may have made some for you. Please call your providers to confirm appointments. It is important to keep your appointments. Please bring your current insurance card, photo ID, co-pay, and all medication bottles to your appointment. If self-pay, payment is expected at the time of service. Your To-Do List     Future Appointments Provider Department Dept Phone    10/20/2017 2:15 PM [facility] MAMMO RM [address] ST Mammography [phone]    NO PERFUME, POWDER, OR DEODORANT UNDER ARMS OR BREAST AREA. DEPARTMENT CONTACT: [id number]    REPORT TO REGISTRATION 30MIN. PRIOR      ***MAMM JAM PATIENTS CAN REPORT DIRECTLY TO  AND DO NOT GO TO REGISTRATION***    2017 1:00 PM RADHA Jose/ [address] [phone]    Please bring your photo ID, insurance card, co-pay and medication bottles with you to your appointment Appointments must be kept or cancelled within 24 hours    2017 3:45 PM Joselo Alston DPM [facility] [phone]    Please arrive 15 minutes prior to appointment, bring photo ID and insurance card.     2017 10:00 AM Erna Warren MD 1000 36  [phone]         Information from Your Visit        Department     Name Address Phone Fax    [facility] [address]  [address] [facility]  [address] [place] [address] [phone]      Vital Signs     Blood Pressure Pulse Height Weight Last Menstrual Period Body Mass Index Date Of Birth Sex Race Ethnicity Preferred Language    1965 Female Bi-Racial / English      Problem List as of 10/5/2017  Date Reviewed: 9/26/2017                Hyperlipidemia    DM (diabetes mellitus) ([facility]    Obesity, morbid ([facility]    Pain syndrome, chronic    Heberden node    Mucoid cyst of joint    Post-traumatic osteoarthritis of left hand    Polyneuritis, diabetic ([facility]    Anxiety      Immunizations as of 10/5/2017     Name Date    PPD Test 11/9/2016, 11/1/2016, 10/19/2016    Pneumococcal Polysaccharide ([id number]) 7/14/2017      Preventive Care        Date Due    Tetanus Combination Vaccine (1 - Tdap) 6/28/1986    Urine Check For Kidney Problems 4/30/2014    Eye Exam By An Eye Doctor 4/14/2017    Mammograms are recommended every 2 years for low/average risk patients aged 48 - 69, and every year for high risk patients per updated national guidelines. However these guidelines can be individualized by your provider. 4/14/2017    Yearly Flu Vaccine (1) 9/1/2017    Colon Cancer Stool Test 7/14/2018    Hemoglobin A1C (Test For Long-Term Glucose Control) 9/27/2018    Cholesterol Screening 9/27/2018    Diabetic Foot Exam 10/5/2018    Pap Smear 10/4/2021            MyChart Signup           Our records indicate that you have declined MyChart signup.

## 2017-10-06 ASSESSMENT — ENCOUNTER SYMPTOMS
BACK PAIN: 0
COLOR CHANGE: 0
SHORTNESS OF BREATH: 0
DIARRHEA: 0
NAUSEA: 0

## 2017-10-06 NOTE — PROGRESS NOTES
Subjective: Tad Harris 50 y.o. female that presents for follow up evaluation of sprain to the right ankle. Chief Complaint   Patient presents with    Results     patient has the results with her     Ankle Pain     right ankle is still painful patient states the pain can be an 8 to a 10     Patient's treatment thus far has included immobilization in a Cam Walker as well as RICE and an MRI. Pain is rated 10 out of 10 and is described as intermittent. Patient has been following my prescribed course of therapy as instructed. Patient inquiring as to the results of her MRI. Review of Systems   Constitutional: Negative for activity change, appetite change, chills, diaphoresis, fatigue and fever. Respiratory: Negative for shortness of breath. Cardiovascular: Negative for leg swelling. Gastrointestinal: Negative for diarrhea and nausea. Endocrine: Negative for cold intolerance, heat intolerance and polyuria. Musculoskeletal: Positive for gait problem and joint swelling. Negative for arthralgias, back pain and myalgias. Skin: Negative for color change, pallor, rash and wound. Allergic/Immunologic: Negative for environmental allergies and food allergies. Neurological: Negative for dizziness, weakness, light-headedness and numbness. Hematological: Does not bruise/bleed easily. Psychiatric/Behavioral: Negative for behavioral problems, confusion and self-injury. The patient is not nervous/anxious. Objective: Clinical evaluation of the patient reveals continued pain with palpation to the right medial foot and ankle. There is pain with palpation along the course of the posterior tibial tendon of the right lower extremity. There remains some pain with palpation to the plantar medial arch of the right foot. Muscle strength is +5/5 to the right lower extremity, but is very painful. There is edema noted to the right foot and ankle. There is no erythema, calor, or open lesion present. There is pain with range of motion to the tarsometatarsal joints of the right foot. A review of the patient's MRI shows a split to the posterior tibial tendon that is possibly a tear, but could represent an anomalous bifurcation of the tendon. Assessment:   1. Tibialis posterior tendon tear, nontraumatic, right     2. Posterior tibial tendinitis, right     3. Sprain, ankle joint, medial, right, subsequent encounter     4. Sprain of foot, right, subsequent encounter     5. Edema of right ankle     6. Edema of right foot     7. Acute right ankle pain     8. Right foot pain           Plan: 1. Clinical evaluation of the patient. 2.  The MRI was reviewed with the patient. The patient was informed that she may require surgery for repair of the posterior tibial tendon. However, she will first required to continue with immobilization and rest.  Patient was informed that if immobilization and rest could relieve her symptoms adequately. Patient informed that if this does not help, then I would recommend surgery for repair of the tendon. Patient states that she understands this. 3. Return in about 6 weeks (around 11/16/2017) for Evaluation of posterior tibial tendinitis versus tear right.    10/5/2017      Valeri Christie DPM

## 2017-10-10 NOTE — TELEPHONE ENCOUNTER
E-scribe request for     ALPRAZolam (XANAX) 0.25 MG tablet . Please review and e-scribe if applicable. Last Visit Date:  07/14/17  Next Visit Date:  11/14/2017    Hemoglobin A1C (%)   Date Value   09/27/2017 7.1 (H)   07/14/2017 7.6 (H)   10/19/2016 6.3             ( goal A1C is < 7)   Microalb/Crt.  Ratio (mcg/mg creat)   Date Value   04/30/2013 323     LDL Cholesterol (mg/dL)   Date Value   09/27/2017            (goal LDL is <100)   AST (U/L)   Date Value   02/17/2012 24     ALT (U/L)   Date Value   02/17/2012 28     BUN (mg/dL)   Date Value   07/14/2017 16     BP Readings from Last 3 Encounters:   10/05/17 112/67   09/26/17 (!) 142/90   07/14/17 (!) 143/86          (goal 120/80)        Patient Active Problem List:     DM (diabetes mellitus) ([address]     Obesity, morbid ([address]     Hyperlipidemia     Pain syndrome, chronic     Anxiety     Polyneuritis, diabetic ([address]     Heberden node     Mucoid cyst of joint     Post-traumatic osteoarthritis of left hand

## 2017-10-11 RX ORDER — ALPRAZOLAM 0.25 MG/1
TABLET ORAL
Qty: 10 TABLET | Refills: 0 | OUTPATIENT
Start: 2017-10-11

## 2017-10-13 ENCOUNTER — TELEPHONE (OUTPATIENT)
Dept: PODIATRY | Age: 52
End: 2017-10-13

## 2017-10-24 RX ORDER — ALPRAZOLAM 0.25 MG/1
TABLET ORAL
Qty: 10 TABLET | Refills: 0 | OUTPATIENT
Start: 2017-10-24 | End: 2017-12-12 | Stop reason: SDUPTHER

## 2017-11-03 RX ORDER — GABAPENTIN 800 MG/1
TABLET ORAL
Qty: 90 TABLET | Refills: 1 | Status: SHIPPED | OUTPATIENT
Start: 2017-11-03 | End: 2018-01-11 | Stop reason: SDUPTHER

## 2017-11-03 NOTE — TELEPHONE ENCOUNTER
rajendra request for GABAPENTIN 800MG TAB  future appointment scheduled. Health Maintenance   Topic Date Due    DTaP/Tdap/Td vaccine (1 - Tdap) 06/28/1986    Diabetic microalbuminuria test  04/30/2014    Diabetic retinal exam  04/14/2017    Breast cancer screen  04/14/2017    Flu vaccine (1) 09/01/2017    Colon Cancer Screen FIT/FOBT  07/14/2018    Diabetic hemoglobin A1C test  09/27/2018    Lipid screen  09/27/2018    Diabetic foot exam  10/05/2018    Cervical cancer screen  10/04/2021    Pneumococcal med risk  Completed    HIV screen  Completed             (applicable per patient's age: Cancer Screenings, Depression Screening, Fall Risk Screening, Immunizations)    Hemoglobin A1C (%)   Date Value   09/27/2017 7.1 (H)   07/14/2017 7.6 (H)   10/19/2016 6.3     Microalb/Crt.  Ratio (mcg/mg creat)   Date Value   04/30/2013 323     LDL Cholesterol (mg/dL)   Date Value   09/27/2017          AST (U/L)   Date Value   02/17/2012 24     ALT (U/L)   Date Value   02/17/2012 28     BUN (mg/dL)   Date Value   07/14/2017 16      (goal A1C is < 7)   (goal LDL is <100) need 30-50% reduction from baseline     BP Readings from Last 3 Encounters:   10/05/17 112/67   09/26/17 (!) 142/90   07/14/17 (!) 143/86    (goal /80)      All Future Testing planned in [facility]:  Lab Frequency Next Occurrence   MAMMO DIGITAL SCREEN BILATERAL Once 10/04/2016   PT aquatic therapy Once 01/26/2017   POLYSOMNOGRAM (SLEEP STUDY) Once 05/10/2017   FULL PFT STUDY WITH PRE AND POST Once 05/31/2017   ISAAC Digital Screen Bilateral Once 10/22/2017       Next Visit Date:  Future Appointments  Date Time Provider Nathalie Barraza   11/14/2017 1:00 PM Cira Venegas MD [facility] IM [facility]   11/16/2017 3:45 PM [place] Agent, DPM [facility]   11/22/2017 10:00 AM Fredy Jimenez MD [facility] OB/Gyn TOLP            Patient Active Problem List:     DM (diabetes mellitus) ([facility]     Obesity, morbid ([address]     Hyperlipidemia     Pain syndrome,

## 2017-11-06 ENCOUNTER — TELEPHONE (OUTPATIENT)
Dept: INTERNAL MEDICINE | Age: 52
End: 2017-11-06

## 2017-11-16 ENCOUNTER — OFFICE VISIT (OUTPATIENT)
Dept: PODIATRY | Age: 52
End: 2017-11-16
Payer: COMMERCIAL

## 2017-11-16 VITALS
HEART RATE: 83 BPM | DIASTOLIC BLOOD PRESSURE: 73 MMHG | HEIGHT: 63 IN | SYSTOLIC BLOOD PRESSURE: 114 MMHG | BODY MASS INDEX: 34.73 KG/M2 | WEIGHT: 196 LBS

## 2017-11-16 DIAGNOSIS — S93.421D: ICD-10-CM

## 2017-11-16 DIAGNOSIS — R60.0 EDEMA OF RIGHT FOOT: ICD-10-CM

## 2017-11-16 DIAGNOSIS — M79.671 RIGHT FOOT PAIN: ICD-10-CM

## 2017-11-16 DIAGNOSIS — M25.471 EDEMA OF RIGHT ANKLE: ICD-10-CM

## 2017-11-16 DIAGNOSIS — S93.601D SPRAIN OF FOOT, RIGHT, SUBSEQUENT ENCOUNTER: ICD-10-CM

## 2017-11-16 DIAGNOSIS — M66.871 TIBIALIS POSTERIOR TENDON TEAR, NONTRAUMATIC, RIGHT: Primary | ICD-10-CM

## 2017-11-16 DIAGNOSIS — E11.9 TYPE 2 DIABETES MELLITUS WITHOUT COMPLICATION, WITHOUT LONG-TERM CURRENT USE OF INSULIN (HCC): ICD-10-CM

## 2017-11-16 DIAGNOSIS — M76.821 POSTERIOR TIBIAL TENDINITIS, RIGHT: ICD-10-CM

## 2017-11-16 DIAGNOSIS — M25.571 ACUTE RIGHT ANKLE PAIN: ICD-10-CM

## 2017-11-16 PROCEDURE — 1036F TOBACCO NON-USER: CPT | Performed by: PODIATRIST

## 2017-11-16 PROCEDURE — 3045F PR MOST RECENT HEMOGLOBIN A1C LEVEL 7.0-9.0%: CPT | Performed by: PODIATRIST

## 2017-11-16 PROCEDURE — G8417 CALC BMI ABV UP PARAM F/U: HCPCS | Performed by: PODIATRIST

## 2017-11-16 PROCEDURE — 99213 OFFICE O/P EST LOW 20 MIN: CPT | Performed by: PODIATRIST

## 2017-11-16 PROCEDURE — G8427 DOCREV CUR MEDS BY ELIG CLIN: HCPCS | Performed by: PODIATRIST

## 2017-11-16 PROCEDURE — G8484 FLU IMMUNIZE NO ADMIN: HCPCS | Performed by: PODIATRIST

## 2017-11-16 PROCEDURE — 3017F COLORECTAL CA SCREEN DOC REV: CPT | Performed by: PODIATRIST

## 2017-11-16 ASSESSMENT — ENCOUNTER SYMPTOMS
SHORTNESS OF BREATH: 0
BACK PAIN: 0
DIARRHEA: 0
NAUSEA: 0
COLOR CHANGE: 0

## 2017-11-22 ENCOUNTER — TELEPHONE (OUTPATIENT)
Dept: OBGYN | Age: 52
End: 2017-11-22

## 2017-11-22 NOTE — TELEPHONE ENCOUNTER
11/22/17 called pt regarding missed appt w/Dr. Eileen Altman was unable to leave a voice message due to pt mailbox being full.

## 2017-11-30 ENCOUNTER — OFFICE VISIT (OUTPATIENT)
Dept: INTERNAL MEDICINE | Age: 52
End: 2017-11-30
Payer: COMMERCIAL

## 2017-11-30 VITALS — HEIGHT: 63 IN | SYSTOLIC BLOOD PRESSURE: 122 MMHG | HEART RATE: 94 BPM | DIASTOLIC BLOOD PRESSURE: 68 MMHG

## 2017-11-30 DIAGNOSIS — F17.200 SMOKER: ICD-10-CM

## 2017-11-30 DIAGNOSIS — Z12.39 BREAST CANCER SCREENING: ICD-10-CM

## 2017-11-30 DIAGNOSIS — M79.641 RIGHT HAND PAIN: ICD-10-CM

## 2017-11-30 DIAGNOSIS — Z23 NEED FOR TDAP VACCINATION: ICD-10-CM

## 2017-11-30 DIAGNOSIS — E11.9 TYPE 2 DIABETES MELLITUS WITHOUT COMPLICATION, WITHOUT LONG-TERM CURRENT USE OF INSULIN (HCC): Primary | ICD-10-CM

## 2017-11-30 DIAGNOSIS — I10 ESSENTIAL HYPERTENSION: ICD-10-CM

## 2017-11-30 PROCEDURE — 3017F COLORECTAL CA SCREEN DOC REV: CPT | Performed by: INTERNAL MEDICINE

## 2017-11-30 PROCEDURE — 90471 IMMUNIZATION ADMIN: CPT | Performed by: INTERNAL MEDICINE

## 2017-11-30 PROCEDURE — 3045F PR MOST RECENT HEMOGLOBIN A1C LEVEL 7.0-9.0%: CPT | Performed by: INTERNAL MEDICINE

## 2017-11-30 PROCEDURE — 1036F TOBACCO NON-USER: CPT | Performed by: INTERNAL MEDICINE

## 2017-11-30 PROCEDURE — G8427 DOCREV CUR MEDS BY ELIG CLIN: HCPCS | Performed by: INTERNAL MEDICINE

## 2017-11-30 PROCEDURE — 99214 OFFICE O/P EST MOD 30 MIN: CPT | Performed by: INTERNAL MEDICINE

## 2017-11-30 PROCEDURE — G8417 CALC BMI ABV UP PARAM F/U: HCPCS | Performed by: INTERNAL MEDICINE

## 2017-11-30 PROCEDURE — 90715 TDAP VACCINE 7 YRS/> IM: CPT | Performed by: INTERNAL MEDICINE

## 2017-11-30 PROCEDURE — G8484 FLU IMMUNIZE NO ADMIN: HCPCS | Performed by: INTERNAL MEDICINE

## 2017-11-30 RX ORDER — AMLODIPINE BESYLATE 5 MG/1
5 TABLET ORAL DAILY
Qty: 30 TABLET | Refills: 6 | Status: SHIPPED | OUTPATIENT
Start: 2017-11-30 | End: 2018-04-27 | Stop reason: SDUPTHER

## 2017-11-30 RX ORDER — NICOTINE 21 MG/24HR
1 PATCH, TRANSDERMAL 24 HOURS TRANSDERMAL EVERY 24 HOURS
Qty: 30 PATCH | Refills: 3 | Status: SHIPPED | OUTPATIENT
Start: 2017-11-30 | End: 2018-04-27 | Stop reason: ALTCHOICE

## 2017-11-30 RX ORDER — HYDROCHLOROTHIAZIDE 25 MG/1
25 TABLET ORAL DAILY
Qty: 30 TABLET | Refills: 3 | Status: SHIPPED | OUTPATIENT
Start: 2017-11-30 | End: 2018-04-07 | Stop reason: SDUPTHER

## 2017-11-30 ASSESSMENT — PATIENT HEALTH QUESTIONNAIRE - PHQ9
SUM OF ALL RESPONSES TO PHQ QUESTIONS 1-9: 1
2. FEELING DOWN, DEPRESSED OR HOPELESS: 1
1. LITTLE INTEREST OR PLEASURE IN DOING THINGS: 0
SUM OF ALL RESPONSES TO PHQ9 QUESTIONS 1 & 2: 1

## 2017-11-30 NOTE — PROGRESS NOTES
[facility]    Progress Note    Date of patient's visit: 11/30/2017    Patient's Name:  Otilio Bang    YOB: 1965            Patient Care Team:  Nikki Meza MD as PCP - General (Internal Medicine)  Crow Leon MD as Referring Physician (Internal Medicine)    REASON FOR VISIT: Routine outpatient follow     Chief Complaint   Patient presents with    Hand Pain     Pt c/o having constant pain in the palms of her hands     Nicotine Dependence     Pt would like to start using nicotine patch-- states chantix did not help her    [facility] Maintenance     declined flu vaccine , mammogram pended,micro pended          HISTORY OF PRESENT ILLNESS:    History was obtained from the patient. Otilio Bang is a 50 y.o. is here for Follow-up. She has hypertension, non-insulin-dependent diabetes, dyslipidemia, obesity and is a smoker. She said she tried Chantix and it did not help. She would like to try nicotine patches. She says she smoking almost one and half packs of cigarettes a day. She is complaining of constant hand pain. It's in both hands but worse on the right hand. She says it's mostly in the right thumb in the metacarpophalangeal and wrist area. She also has constant feet pain. She is taking gabapentin 800 mg 3 times a day which she does say helps. She wants more gabapentin. She had an EMG done which did not show peripheral neuropathy or radiculopathy. She has Heberden's nodes and a mucoid cyst in her little finger on the right hand. She has been seen by orthopedics at [facility] and also went to [address] [place] [address]. She is also seeing a podiatrist at both places. She says she has also been told she has a posterior tibial tear but didn't want surgery. She was wearing a cam boot. She was checked for rheumatoid factor which was negative.           Past Medical History:   Diagnosis Date    Anxiety 2/23/2017    Chronic back pain Constitutional: Negative for diaphoresis, fever, malaise/fatigue and weight loss. Eyes: Negative for blurred vision and redness. Respiratory: Negative for cough and sputum production. Cardiovascular: Negative for chest pain, palpitations and leg swelling. Gastrointestinal: Negative for abdominal pain, blood in stool, constipation and nausea. Musculoskeletal: Positive for joint pain. Negative for back pain. Skin: Negative for itching and rash. Neurological: Positive for sensory change. Negative for dizziness, tingling, tremors, focal weakness, loss of consciousness and headaches. Endo/Heme/Allergies: Negative for polydipsia. Does not bruise/bleed easily. PHYSICAL EXAM:      Vitals:    11/30/17 1526 11/30/17 1544   BP: (!) 138/114 122/68   Site: Right Arm Right Arm   Position: Sitting Sitting   Cuff Size: Large Adult Large Adult   Pulse: 94    Height: 5' 3\" (1.6 m)      There is no height or weight on file to calculate BMI. BP Readings from Last 3 Encounters:   11/30/17 122/68   11/16/17 114/73   10/05/17 112/67        Wt Readings from Last 3 Encounters:   11/16/17 196 lb (88.9 kg)   10/05/17 207 lb (93.9 kg)   09/26/17 207 lb (93.9 kg)       Physical Exam      HENT:  Normocephalic, Atraumatic, Neck- Normal range of motion, No tenderness, Supple, No stridor. Eyes:  PERRL, EOMI, Conjunctiva normal, No discharge. Respiratory:  Normal breath sounds, No respiratory distress, No wheezing, No chest tenderness. Cardiovascular:  Normal heart rate, Normal rhythm, No murmurs, No rubs, No gallops. GI:  Bowel sounds normal, Soft, No tenderness  Musculoskeletal:  Intact distal pulses, No edema, mild tenderness right MCP joint thumb. No wrist tenderness. No synovitis. Mucoid cyst right little finger DIP joint. tenderness, Back- No tenderness. Integument:  Warm, Dry, No erythema, No rash. Lymphatic:  No lymphadenopathy noted.    Neurologic:  Alert & oriented x 3, Normal motor function, Normal sensory function, No focal deficits noted. LABORATORY FINDINGS:    CBC:  Lab Results   Component Value Date    WBC 7.6 01/04/2013    HGB 13.6 01/04/2013     01/04/2013     BMP:    Lab Results   Component Value Date     07/14/2017    K 4.4 07/14/2017    CL 98 07/14/2017    CO2 27 07/14/2017    BUN 16 07/14/2017    CREATININE 0.85 07/14/2017    GLUCOSE 148 07/14/2017    GLUCOSE 201 02/17/2012     HEMOGLOBIN A1C:   Lab Results   Component Value Date    LABA1C 7.1 09/27/2017     MICROALBUMIN URINE:   Lab Results   Component Value Date    GSDITLHOXJ 772 04/30/2013     FASTING LIPID PANEL:  Lab Results   Component Value Date    CHOL 252 (H) 09/27/2017    HDL 30 (L) 09/27/2017    TRIG 538 (H) 09/27/2017     Lab Results   Component Value Date    LDLCHOLESTEROL      09/27/2017    LDLDIRECT 128 (H) 09/27/2017       LIVER PROFILE:  Lab Results   Component Value Date    ALT 28 02/17/2012    AST 24 02/17/2012    BILITOT 0.36 02/17/2012    BILIDIR 0.09 02/17/2012    LABALBU 4.3 02/17/2012      THYROID FUNCTION:   Lab Results   Component Value Date    TSH 1.78 11/20/2012      URINE ANALYSIS: No results found for: LABURIN  ASSESSMENT AND PLAN:    1. Type 2 diabetes mellitus without complication, without long-term current use of insulin (HCC)  Asa    - Microalbumin, Ur; Future  - metFORMIN (GLUCOPHAGE) 500 MG tablet; TAKE ONE TABLET BY MOUTH TWICE DAILY WITH FOOD  Dispense: 60 tablet; Refill: 3    2. Right hand pain  Follow up with orthopedics  NSAID's    - XR HAND RIGHT (MIN 3 VIEWS); Future    3. Essential hypertension    - hydrochlorothiazide (HYDRODIURIL) 25 MG tablet; Take 1 tablet by mouth daily  Dispense: 30 tablet; Refill: 3  - amLODIPine (NORVASC) 5 MG tablet; Take 1 tablet by mouth daily  Dispense: 30 tablet; Refill: 6    4. Smoker  Stop smoking    - nicotine (NICODERM CQ) 21 MG/24HR; Place 1 patch onto the skin every 24 hours  Dispense: 30 patch; Refill: 3    5.  Breast cancer screening    - [facility] DIGITAL

## 2017-11-30 NOTE — PROGRESS NOTES
Visit Information    Have you changed or started any medications since your last visit including any over-the-counter medicines, vitamins, or herbal medicines? no   Are you having any side effects from any of your medications? -  no  Have you stopped taking any of your medications? Is so, why? -  no    Have you seen any other physician or provider since your last visit? No  Have you had any other diagnostic tests since your last visit? No  Have you been seen in the emergency room and/or had an admission to a hospital since we last saw you? No  Have you had your routine dental cleaning in the past 6 months? no    Have you activated your [employer] account? If not, what are your barriers?  No:      Patient Care Team:  Joan Soni MD as PCP - General (Internal Medicine)  Marquise Weeks MD as Referring Physician (Internal Medicine)    Medical History Review  Past Medical, Family, and Social History reviewed and does contribute to the patient presenting condition    Health Maintenance   Topic Date Due    DTaP/Tdap/Td vaccine (1 - Tdap) 06/28/1986    Diabetic microalbuminuria test  04/30/2014    Diabetic retinal exam  04/14/2017    Breast cancer screen  04/14/2017    Flu vaccine (1) 09/01/2017    Colon Cancer Screen FIT/FOBT  07/14/2018    Diabetic hemoglobin A1C test  09/27/2018    Lipid screen  09/27/2018    Diabetic foot exam  10/05/2018    Cervical cancer screen  10/04/2021    Pneumococcal med risk  Completed    HIV screen  Completed

## 2017-12-03 ASSESSMENT — ENCOUNTER SYMPTOMS
EYE REDNESS: 0
SPUTUM PRODUCTION: 0
NAUSEA: 0
COUGH: 0
CONSTIPATION: 0
BLURRED VISION: 0
ABDOMINAL PAIN: 0
BLOOD IN STOOL: 0
BACK PAIN: 0

## 2017-12-13 RX ORDER — ALPRAZOLAM 0.25 MG/1
TABLET ORAL
Qty: 10 TABLET | Refills: 0 | Status: SHIPPED | OUTPATIENT
Start: 2017-12-13 | End: 2018-01-12 | Stop reason: SDUPTHER

## 2018-01-08 ENCOUNTER — TELEPHONE (OUTPATIENT)
Dept: INTERNAL MEDICINE | Age: 53
End: 2018-01-08

## 2018-01-09 RX ORDER — OSELTAMIVIR PHOSPHATE 75 MG/1
75 CAPSULE ORAL 2 TIMES DAILY
Qty: 10 CAPSULE | Refills: 0 | Status: SHIPPED | OUTPATIENT
Start: 2018-01-09 | End: 2018-01-14

## 2018-01-09 RX ORDER — ALPRAZOLAM 0.25 MG/1
TABLET ORAL
Qty: 10 TABLET | Refills: 0 | Status: CANCELLED | OUTPATIENT
Start: 2018-01-09

## 2018-01-10 NOTE — TELEPHONE ENCOUNTER
Received call from physician link for [facility] clinic at 9:50 pm    Pt had called [facility] clinic in the morning   She has temp 101, cough and sore throat  She had exposure to Influenza A. Her daughter was diagnosed 2 days ago and she brought her to ER. She has no active vomiting but just dry heave  She has been able to keep fluid down    I have sent Tamiflu prescription to [facility] pharmacy. Advised her to take it for 5 days and call if necessary.     Electronically signed by Kim Mcdowell MD on 1/9/2018 at 10:01 PM

## 2018-01-12 RX ORDER — GABAPENTIN 800 MG/1
TABLET ORAL
Qty: 90 TABLET | Refills: 1 | Status: SHIPPED | OUTPATIENT
Start: 2018-01-12 | End: 2018-03-08 | Stop reason: SDUPTHER

## 2018-01-12 RX ORDER — ALPRAZOLAM 0.25 MG/1
TABLET ORAL
Qty: 10 TABLET | Refills: 0 | OUTPATIENT
Start: 2018-01-12 | End: 2018-02-12

## 2018-01-12 NOTE — TELEPHONE ENCOUNTER
Fax request for Xanax added to refill request . Please review and e-scribe if applicable. Last Visit Date: 11/30/75  Next Visit Date:  6/4/2018    Hemoglobin A1C (%)   Date Value   09/27/2017 7.1 (H)   07/14/2017 7.6 (H)   10/19/2016 6.3             ( goal A1C is < 7)   Microalb/Crt.  Ratio (mcg/mg creat)   Date Value   04/30/2013 323     LDL Cholesterol (mg/dL)   Date Value   09/27/2017            (goal LDL is <100)   AST (U/L)   Date Value   02/17/2012 24     ALT (U/L)   Date Value   02/17/2012 28     BUN (mg/dL)   Date Value   07/14/2017 16     BP Readings from Last 3 Encounters:   11/30/17 122/68   11/16/17 114/73   10/05/17 112/67          (goal 120/80)        Patient Active Problem List:     DM (diabetes mellitus) ([address]     Obesity, morbid ([address]     Hyperlipidemia     Pain syndrome, chronic     Anxiety     Polyneuritis, diabetic ([address]     Heberden node     Mucoid cyst of joint     Post-traumatic osteoarthritis of left hand      MA
Please call in.  Thanks
VM left at [address] that Xanax 0.25 mg tablet, #10 with 0 refills  Take 1/2 to 1 tablet once daily as needed
Heberden node     Mucoid cyst of joint     Post-traumatic osteoarthritis of left hand

## 2018-02-18 ENCOUNTER — HOSPITAL ENCOUNTER (EMERGENCY)
Age: 53
Discharge: HOME | End: 2018-02-18
Attending: EMERGENCY MEDICINE
Payer: COMMERCIAL

## 2018-02-18 VITALS
HEART RATE: 88 BPM | BODY MASS INDEX: 39.34 KG/M2 | SYSTOLIC BLOOD PRESSURE: 124 MMHG | RESPIRATION RATE: 16 BRPM | OXYGEN SATURATION: 95 % | DIASTOLIC BLOOD PRESSURE: 87 MMHG | HEIGHT: 63 IN | WEIGHT: 222 LBS | TEMPERATURE: 98.4 F

## 2018-02-18 DIAGNOSIS — K08.89 PAIN, DENTAL: Primary | ICD-10-CM

## 2018-02-18 PROCEDURE — 99282 EMERGENCY DEPT VISIT SF MDM: CPT

## 2018-02-18 RX ORDER — CHLORHEXIDINE GLUCONATE 0.12 MG/ML
RINSE ORAL
COMMUNITY
Start: 2018-02-13 | End: 2019-05-22

## 2018-02-18 RX ORDER — PENICILLIN V POTASSIUM 500 MG/1
500 TABLET ORAL 4 TIMES DAILY
Qty: 40 TABLET | Refills: 0 | Status: SHIPPED | OUTPATIENT
Start: 2018-02-18 | End: 2018-02-28

## 2018-02-18 RX ORDER — IBUPROFEN 800 MG/1
800 TABLET ORAL EVERY 8 HOURS PRN
Qty: 15 TABLET | Refills: 0 | Status: SHIPPED | OUTPATIENT
Start: 2018-02-18 | End: 2018-04-27 | Stop reason: ALTCHOICE

## 2018-02-18 RX ORDER — ACETAMINOPHEN AND CODEINE PHOSPHATE 300; 30 MG/1; MG/1
1 TABLET ORAL EVERY 6 HOURS PRN
Qty: 20 TABLET | Refills: 0 | Status: SHIPPED | OUTPATIENT
Start: 2018-02-18 | End: 2018-02-25

## 2018-02-18 ASSESSMENT — ENCOUNTER SYMPTOMS
VOICE CHANGE: 0
COLOR CHANGE: 0
COUGH: 0
TROUBLE SWALLOWING: 0
VOMITING: 0
FACIAL SWELLING: 0
SHORTNESS OF BREATH: 0
NAUSEA: 0
SORE THROAT: 0

## 2018-02-18 ASSESSMENT — PAIN DESCRIPTION - ONSET: ONSET: ON-GOING

## 2018-02-18 ASSESSMENT — PAIN DESCRIPTION - DESCRIPTORS: DESCRIPTORS: ACHING

## 2018-02-18 ASSESSMENT — PAIN DESCRIPTION - PAIN TYPE: TYPE: ACUTE PAIN

## 2018-02-18 ASSESSMENT — PAIN SCALES - GENERAL: PAINLEVEL_OUTOF10: 10

## 2018-02-18 ASSESSMENT — PAIN DESCRIPTION - LOCATION: LOCATION: TEETH

## 2018-02-18 ASSESSMENT — PAIN DESCRIPTION - PROGRESSION: CLINICAL_PROGRESSION: GRADUALLY WORSENING

## 2018-02-18 ASSESSMENT — PAIN DESCRIPTION - FREQUENCY: FREQUENCY: CONTINUOUS

## 2018-02-19 NOTE — ED PROVIDER NOTES
Team [address] ED  eMERGENCY dEPARTMENT eNCOUnter      Pt Name: Carol Caldwell  MRN: [id number]  [place] 1965  Date of evaluation: 2/18/2018  Provider: Jacqueline Joseph NP    CHIEF COMPLAINT       Chief Complaint   Patient presents with    Dental Pain     upper right side x 1 week         HISTORY OF PRESENT ILLNESS  (Location/Symptom, Timing/Onset, Context/Setting, Quality, Duration, Modifying Factors, Severity.)   Carol Caldwell is a 50 y.o. female who presents to the emergency department via private auto for right upper dental pain. Onset was 2 weeks ago. States she saw her dentist; she was advised to follow up with an oral surgeon to have her tooth extracted. She has a crown on the tooth currently. Denies fever, chills. States she was prescribed a mouthwash and pain medication. States the mouthwash did not help and she is out of pain medication. She has an appointment with an oral surgeon in April. Reports she is waiting for call-backs from 2 other offices. Rates her pain 10/10 at this time. Nursing Notes were reviewed. ALLERGIES     Latex; Lisinopril; Vicodin [hydrocodone-acetaminophen]; Hydrocodone-acetaminophen; and Zocor [simvastatin]    CURRENT MEDICATIONS       Previous Medications    AMLODIPINE (NORVASC) 5 MG TABLET    Take 1 tablet by mouth daily    CHLORHEXIDINE (PERIDEX) 0.12 % SOLUTION        GABAPENTIN (NEURONTIN) 800 MG TABLET    TAKE ONE TABLET BY MOUTH THREE TIMES DAILY *WILL  CAUSE  DROWSINESS. DO  NOT  DRIVE  IF  TAKING*.     HYDROCHLOROTHIAZIDE (HYDRODIURIL) 25 MG TABLET    Take 1 tablet by mouth daily    METFORMIN (GLUCOPHAGE) 500 MG TABLET    TAKE ONE TABLET BY MOUTH TWICE DAILY WITH FOOD    NICOTINE (NICODERM CQ) 21 MG/24HR    Place 1 patch onto the skin every 24 hours       PAST MEDICAL HISTORY         Diagnosis Date    Anxiety 2/23/2017    Chronic back pain     COPD (chronic obstructive pulmonary disease) (HCC)     GERD (gastroesophageal reflux disease) resolved not taking medds    HSV-2 (herpes simplex virus 2) infection 2013    Hyperlipidemia     Hypertension     Neuropathy (HCC)     feet    Obesity     Osteoarthritis     Type II or unspecified type diabetes mellitus without mention of complication, not stated as uncontrolled     Urinary incontinence        SURGICAL HISTORY           Procedure Laterality Date    TONSILLECTOMY AND ADENOIDECTOMY      TUBAL LIGATION           FAMILY HISTORY           Problem Relation Age of Onset    Diabetes Mother     Hypertension Mother     Breast Cancer Sister 22     had a double mastectomy    Diabetes Maternal Grandmother     Cancer Neg Hx     Colon Cancer Neg Hx     Eclampsia Neg Hx     Ovarian Cancer Neg Hx      Labor Neg Hx     Spont Abortions Neg Hx     Stroke Neg Hx      Family Status   Relation Status    Mother Alive    Father Alive    Sister Alive    Maternal Grandmother     Neg Hx         SOCIAL HISTORY      reports that she quit smoking about 14 months ago. Her smoking use included Cigarettes. She has a 20.00 pack-year smoking history. She has never used smokeless tobacco. She reports that she drinks about 0.5 - 1.0 oz of alcohol per week . She reports that she does not use drugs. REVIEW OF SYSTEMS    (2-9 systems for level 4, 10 or more for level 5)     Review of Systems   Constitutional: Negative for chills, diaphoresis, fatigue and fever. HENT: Positive for dental problem. Negative for congestion, drooling, facial swelling, sore throat, trouble swallowing and voice change. Respiratory: Negative for cough and shortness of breath. Cardiovascular: Negative for chest pain. Gastrointestinal: Negative for nausea and vomiting. Skin: Negative for color change, rash and wound. Neurological: Negative for dizziness, light-headedness and headaches. Except as noted above the remainder of the review of systems was reviewed and negative.      PHYSICAL EXAM    (up to 7 for

## 2018-02-19 NOTE — ED NOTES
Pt presents to ED with c/o R sided jaw pain; Pt states top of tooth and gum have been causing on going pain for months; pt states the pain intensified in the last 2 weeks; Rates pain as 10/10 and aching. Swelling noted to right side of mouth; no other complaints at this time. Pt was seen by dentist last week and referred to oral surgeon; yet to get an appt.            Ariel Hinton RN  02/18/18 [id number]

## 2018-03-08 DIAGNOSIS — F41.9 ANXIETY: ICD-10-CM

## 2018-03-08 DIAGNOSIS — G89.4 PAIN SYNDROME, CHRONIC: Primary | ICD-10-CM

## 2018-03-09 RX ORDER — ALPRAZOLAM 0.25 MG/1
TABLET ORAL
Qty: 10 TABLET | Refills: 0 | Status: SHIPPED | OUTPATIENT
Start: 2018-03-09 | End: 2018-04-08

## 2018-03-09 RX ORDER — GABAPENTIN 800 MG/1
TABLET ORAL
Qty: 90 TABLET | Refills: 1 | Status: SHIPPED | OUTPATIENT
Start: 2018-03-09 | End: 2018-05-08 | Stop reason: SDUPTHER

## 2018-03-26 ENCOUNTER — TELEPHONE (OUTPATIENT)
Dept: INTERNAL MEDICINE | Age: 53
End: 2018-03-26

## 2018-03-26 NOTE — LETTER
MAN[address]  [address] [facility] 29 [facility]  Phone: [phone]  Fax: [phone]    Silvano Knight MD        March 26, 2018    [employer]  [address]      Dear Darren Greco: We are sending this letter because your PCP ordered [facility] for you to have done at your last visit here and they have not yet been completed. If you do not have the order, please stop by the office to get a print out of the order. If you do not have a follow-up appointment scheduled, please contact our office to set up an appointment, or if you stop to get a printout of the order you can make an appointment at that time. If you have any questions or concerns, please don't hesitate to call.     Sincerely,        Silvano Knight MD

## 2018-04-07 DIAGNOSIS — E11.9 TYPE 2 DIABETES MELLITUS WITHOUT COMPLICATION, WITHOUT LONG-TERM CURRENT USE OF INSULIN (HCC): ICD-10-CM

## 2018-04-07 DIAGNOSIS — I10 ESSENTIAL HYPERTENSION: ICD-10-CM

## 2018-04-09 RX ORDER — HYDROCHLOROTHIAZIDE 25 MG/1
TABLET ORAL
Qty: 30 TABLET | Refills: 3 | Status: SHIPPED | OUTPATIENT
Start: 2018-04-09 | End: 2018-06-26 | Stop reason: SDUPTHER

## 2018-04-27 ENCOUNTER — OFFICE VISIT (OUTPATIENT)
Dept: INTERNAL MEDICINE | Age: 53
End: 2018-04-27
Payer: COMMERCIAL

## 2018-04-27 VITALS
HEART RATE: 90 BPM | WEIGHT: 223 LBS | SYSTOLIC BLOOD PRESSURE: 132 MMHG | DIASTOLIC BLOOD PRESSURE: 81 MMHG | HEIGHT: 63 IN | BODY MASS INDEX: 39.51 KG/M2

## 2018-04-27 DIAGNOSIS — I10 ESSENTIAL HYPERTENSION: ICD-10-CM

## 2018-04-27 DIAGNOSIS — R53.82 CHRONIC FATIGUE: ICD-10-CM

## 2018-04-27 DIAGNOSIS — F41.1 GAD (GENERALIZED ANXIETY DISORDER): ICD-10-CM

## 2018-04-27 DIAGNOSIS — E78.00 PURE HYPERCHOLESTEROLEMIA: ICD-10-CM

## 2018-04-27 DIAGNOSIS — G56.03 BILATERAL CARPAL TUNNEL SYNDROME: ICD-10-CM

## 2018-04-27 DIAGNOSIS — F17.200 SMOKER: ICD-10-CM

## 2018-04-27 LAB — HBA1C MFR BLD: 7.8 %

## 2018-04-27 PROCEDURE — 99215 OFFICE O/P EST HI 40 MIN: CPT

## 2018-04-27 PROCEDURE — 83036 HEMOGLOBIN GLYCOSYLATED A1C: CPT | Performed by: INTERNAL MEDICINE

## 2018-04-27 PROCEDURE — G8427 DOCREV CUR MEDS BY ELIG CLIN: HCPCS | Performed by: INTERNAL MEDICINE

## 2018-04-27 PROCEDURE — 3017F COLORECTAL CA SCREEN DOC REV: CPT | Performed by: INTERNAL MEDICINE

## 2018-04-27 PROCEDURE — 99214 OFFICE O/P EST MOD 30 MIN: CPT | Performed by: INTERNAL MEDICINE

## 2018-04-27 PROCEDURE — 1036F TOBACCO NON-USER: CPT | Performed by: INTERNAL MEDICINE

## 2018-04-27 PROCEDURE — G8417 CALC BMI ABV UP PARAM F/U: HCPCS | Performed by: INTERNAL MEDICINE

## 2018-04-27 PROCEDURE — 3045F PR MOST RECENT HEMOGLOBIN A1C LEVEL 7.0-9.0%: CPT | Performed by: INTERNAL MEDICINE

## 2018-04-27 PROCEDURE — 2022F DILAT RTA XM EVC RTNOPTHY: CPT | Performed by: INTERNAL MEDICINE

## 2018-04-27 RX ORDER — GLIMEPIRIDE 2 MG/1
2 TABLET ORAL EVERY MORNING
Qty: 30 TABLET | Refills: 3 | Status: SHIPPED | OUTPATIENT
Start: 2018-04-27 | End: 2018-06-26

## 2018-04-27 RX ORDER — AMLODIPINE BESYLATE 5 MG/1
5 TABLET ORAL DAILY
Qty: 30 TABLET | Refills: 6 | Status: SHIPPED | OUTPATIENT
Start: 2018-04-27 | End: 2018-11-01 | Stop reason: SDUPTHER

## 2018-04-27 ASSESSMENT — ENCOUNTER SYMPTOMS
ABDOMINAL PAIN: 0
SPUTUM PRODUCTION: 0
EYE REDNESS: 0
NAUSEA: 0
COUGH: 0
BLOOD IN STOOL: 0
CONSTIPATION: 0
BLURRED VISION: 0
BACK PAIN: 0

## 2018-04-29 PROBLEM — E11.42: Status: RESOLVED | Noted: 2017-03-01 | Resolved: 2018-04-29

## 2018-05-08 DIAGNOSIS — G89.4 PAIN SYNDROME, CHRONIC: ICD-10-CM

## 2018-05-08 RX ORDER — GABAPENTIN 800 MG/1
TABLET ORAL
Qty: 90 TABLET | Refills: 1 | Status: SHIPPED | OUTPATIENT
Start: 2018-05-08 | End: 2018-07-03 | Stop reason: SDUPTHER

## 2018-06-20 ENCOUNTER — HOSPITAL ENCOUNTER (OUTPATIENT)
Dept: SLEEP CENTER | Age: 53
Discharge: HOME | End: 2018-06-22
Payer: COMMERCIAL

## 2018-06-20 DIAGNOSIS — R53.82 CHRONIC FATIGUE: ICD-10-CM

## 2018-06-20 DIAGNOSIS — I10 ESSENTIAL HYPERTENSION: ICD-10-CM

## 2018-06-20 PROCEDURE — 95810 POLYSOM 6/> YRS 4/> PARAM: CPT

## 2018-06-20 ASSESSMENT — SLEEP AND FATIGUE QUESTIONNAIRES
ESS TOTAL SCORE: 17
HOW LIKELY ARE YOU TO NOD OFF OR FALL ASLEEP WHILE LYING DOWN TO REST IN THE AFTERNOON WHEN CIRCUMSTANCES PERMIT: 3
HOW LIKELY ARE YOU TO NOD OFF OR FALL ASLEEP WHILE SITTING AND TALKING TO SOMEONE: 1
HOW LIKELY ARE YOU TO NOD OFF OR FALL ASLEEP WHEN YOU ARE A PASSENGER IN A CAR FOR AN HOUR WITHOUT A BREAK: 3
HOW LIKELY ARE YOU TO NOD OFF OR FALL ASLEEP IN A CAR, WHILE STOPPED FOR A FEW MINUTES IN TRAFFIC: 1
HOW LIKELY ARE YOU TO NOD OFF OR FALL ASLEEP WHILE SITTING AND READING: 3
HOW LIKELY ARE YOU TO NOD OFF OR FALL ASLEEP WHILE SITTING INACTIVE IN A PUBLIC PLACE: 1
HOW LIKELY ARE YOU TO NOD OFF OR FALL ASLEEP WHILE WATCHING TV: 2
HOW LIKELY ARE YOU TO NOD OFF OR FALL ASLEEP WHILE SITTING QUIETLY AFTER LUNCH WITHOUT ALCOHOL: 3

## 2018-06-21 VITALS — BODY MASS INDEX: 39.51 KG/M2 | RESPIRATION RATE: 18 BRPM | HEART RATE: 69 BPM | WEIGHT: 223 LBS | HEIGHT: 63 IN

## 2018-06-26 ENCOUNTER — OFFICE VISIT (OUTPATIENT)
Dept: INTERNAL MEDICINE | Age: 53
End: 2018-06-26
Payer: COMMERCIAL

## 2018-06-26 ENCOUNTER — TELEPHONE (OUTPATIENT)
Dept: INTERNAL MEDICINE | Age: 53
End: 2018-06-26

## 2018-06-26 ENCOUNTER — HOSPITAL ENCOUNTER (OUTPATIENT)
Dept: GENERAL RADIOLOGY | Age: 53
Discharge: HOME | End: 2018-06-28
Payer: COMMERCIAL

## 2018-06-26 ENCOUNTER — HOSPITAL ENCOUNTER (OUTPATIENT)
Age: 53
Discharge: HOME | End: 2018-06-28
Payer: COMMERCIAL

## 2018-06-26 VITALS
HEART RATE: 82 BPM | SYSTOLIC BLOOD PRESSURE: 119 MMHG | BODY MASS INDEX: 40.22 KG/M2 | HEIGHT: 63 IN | DIASTOLIC BLOOD PRESSURE: 77 MMHG | WEIGHT: 227 LBS

## 2018-06-26 DIAGNOSIS — E78.00 PURE HYPERCHOLESTEROLEMIA: ICD-10-CM

## 2018-06-26 DIAGNOSIS — M79.641 RIGHT HAND PAIN: Primary | ICD-10-CM

## 2018-06-26 DIAGNOSIS — M79.642 LEFT HAND PAIN: Primary | ICD-10-CM

## 2018-06-26 DIAGNOSIS — E11.9 TYPE 2 DIABETES MELLITUS WITHOUT COMPLICATION, WITHOUT LONG-TERM CURRENT USE OF INSULIN (HCC): ICD-10-CM

## 2018-06-26 DIAGNOSIS — G56.01 CARPAL TUNNEL SYNDROME OF RIGHT WRIST: ICD-10-CM

## 2018-06-26 DIAGNOSIS — M79.641 RIGHT HAND PAIN: ICD-10-CM

## 2018-06-26 DIAGNOSIS — I10 ESSENTIAL HYPERTENSION: ICD-10-CM

## 2018-06-26 DIAGNOSIS — M79.642 PAIN OF LEFT HAND: ICD-10-CM

## 2018-06-26 PROCEDURE — G8427 DOCREV CUR MEDS BY ELIG CLIN: HCPCS | Performed by: INTERNAL MEDICINE

## 2018-06-26 PROCEDURE — 3045F PR MOST RECENT HEMOGLOBIN A1C LEVEL 7.0-9.0%: CPT | Performed by: INTERNAL MEDICINE

## 2018-06-26 PROCEDURE — 73130 X-RAY EXAM OF HAND: CPT

## 2018-06-26 PROCEDURE — G8417 CALC BMI ABV UP PARAM F/U: HCPCS | Performed by: INTERNAL MEDICINE

## 2018-06-26 PROCEDURE — 99213 OFFICE O/P EST LOW 20 MIN: CPT | Performed by: INTERNAL MEDICINE

## 2018-06-26 PROCEDURE — 1036F TOBACCO NON-USER: CPT | Performed by: INTERNAL MEDICINE

## 2018-06-26 PROCEDURE — 2022F DILAT RTA XM EVC RTNOPTHY: CPT | Performed by: INTERNAL MEDICINE

## 2018-06-26 PROCEDURE — 3017F COLORECTAL CA SCREEN DOC REV: CPT | Performed by: INTERNAL MEDICINE

## 2018-06-26 RX ORDER — GLUCOSAMINE HCL/CHONDROITIN SU 500-400 MG
CAPSULE ORAL
Qty: 100 STRIP | Refills: 11 | Status: SHIPPED | OUTPATIENT
Start: 2018-06-26 | End: 2018-09-11 | Stop reason: SDUPTHER

## 2018-06-26 RX ORDER — LANCETS 30 GAUGE
EACH MISCELLANEOUS
Qty: 100 EACH | Refills: 11 | Status: SHIPPED | OUTPATIENT
Start: 2018-06-26 | End: 2019-07-22 | Stop reason: SDUPTHER

## 2018-06-26 RX ORDER — HYDROCHLOROTHIAZIDE 25 MG/1
TABLET ORAL
Qty: 90 TABLET | Refills: 1 | Status: SHIPPED | OUTPATIENT
Start: 2018-06-26 | End: 2018-11-01 | Stop reason: SDUPTHER

## 2018-06-26 ASSESSMENT — ENCOUNTER SYMPTOMS
BLURRED VISION: 0
SPUTUM PRODUCTION: 0
SHORTNESS OF BREATH: 0
EYE REDNESS: 0
BACK PAIN: 0
COUGH: 0

## 2018-07-03 DIAGNOSIS — G89.4 PAIN SYNDROME, CHRONIC: ICD-10-CM

## 2018-07-03 RX ORDER — GABAPENTIN 800 MG/1
TABLET ORAL
Qty: 90 TABLET | Refills: 1 | Status: SHIPPED | OUTPATIENT
Start: 2018-07-03 | End: 2018-09-01 | Stop reason: SDUPTHER

## 2018-07-27 LAB — STATUS: NORMAL

## 2018-08-01 ENCOUNTER — TELEPHONE (OUTPATIENT)
Dept: INTERNAL MEDICINE | Age: 53
End: 2018-08-01

## 2018-08-01 NOTE — TELEPHONE ENCOUNTER
Patient requesting a Rheumatology referral.  States in pain all the time and has arthritis all over her body. She said she was diagnosed by Dr. Anita Henriquez with Rheumatoid Arthritis years ago.

## 2018-08-03 NOTE — TELEPHONE ENCOUNTER
Patient is very angry that she can not get a Rheumatology appt. Earlier appt scheduled with Dr. Cynthia Santiago on 8/16/18 when she is back from vacation. She also said she was scheduled for an EMG which she doesn't want. Advised to call to cancel appt.

## 2018-08-23 ENCOUNTER — OFFICE VISIT (OUTPATIENT)
Dept: OBGYN | Age: 53
End: 2018-08-23
Payer: COMMERCIAL

## 2018-08-23 VITALS
HEIGHT: 63 IN | WEIGHT: 222 LBS | SYSTOLIC BLOOD PRESSURE: 113 MMHG | HEART RATE: 94 BPM | RESPIRATION RATE: 18 BRPM | DIASTOLIC BLOOD PRESSURE: 82 MMHG | BODY MASS INDEX: 39.34 KG/M2

## 2018-08-23 DIAGNOSIS — Z01.419 WELL WOMAN EXAM WITH ROUTINE GYNECOLOGICAL EXAM: Primary | ICD-10-CM

## 2018-08-23 DIAGNOSIS — Z80.3 FAMILY HISTORY OF BREAST CANCER: ICD-10-CM

## 2018-08-23 PROCEDURE — 99212 OFFICE O/P EST SF 10 MIN: CPT | Performed by: OBSTETRICS & GYNECOLOGY

## 2018-08-23 PROCEDURE — 99396 PREV VISIT EST AGE 40-64: CPT | Performed by: OBSTETRICS & GYNECOLOGY

## 2018-08-23 NOTE — PROGRESS NOTES
AB                   PAST MEDICAL HISTORY:   has a past medical history of Anxiety; Chronic back pain; COPD (chronic obstructive pulmonary disease) ([facility] [address] GERD (gastroesophageal reflux disease); HSV-2 (herpes simplex virus 2) infection; Hyperlipidemia; Hypertension; Neuropathy; Obesity; Osteoarthritis; Type II or unspecified type diabetes mellitus without mention of complication, not stated as uncontrolled; and Urinary incontinence. PAST SURGICAL HISTORY:   has a past surgical history that includes Tonsillectomy and adenoidectomy and Tubal ligation. ALLERGIES:  is allergic to latex; lisinopril; vicodin [hydrocodone-acetaminophen]; and zocor [simvastatin]. MEDICATIONS:  Prior to Admission medications    Medication Sig Start Date End Date Taking? Authorizing Provider   Lancets MISC Test 2/day 6/26/18  Yes Mckenzie Coronel MD   oxaprozin (DAYPRO) 600 MG tablet TAKE 1 TABLET BY MOUTH ONCE DAILY 11/5/18   Mckenzie Coronel MD   gabapentin (NEURONTIN) 800 MG tablet TAKE 1 TABLET BY MOUTH THREE TIMES DAILY **WILL  CAUSE  DROWSINESS.   DO  NOT  DRIVE  IF  ON  THIS  MEDICATION**. 11/3/18 12/3/18  Mckenzie Coronel MD   hydrochlorothiazide (HYDRODIURIL) 25 MG tablet TAKE ONE TABLET BY MOUTH ONCE DAILY 11/1/18   Mckenzie Coronel MD   metFORMIN (GLUCOPHAGE) 1000 MG tablet TAKE ONE TABLET BY MOUTH TWICE DAILY WITH FOOD 11/1/18   Mckenzie Coronel MD   amLODIPine (NORVASC) 5 MG tablet Take 1 tablet by mouth daily 11/1/18   Mckenzie Coronel MD   atorvastatin (LIPITOR) 10 MG tablet Take 1 tablet by mouth daily 9/28/18 9/28/19  Mckenzie Coronel MD   blood glucose monitor strips Test 1  times daily 9/11/18   Mckenzie Coronel MD   glimepiride (AMARYL) 2 MG tablet TAKE 1 TABLET BY MOUTH EVERY MORNING 9/4/18   Mckenzie Coronel MD   chlorhexidine (PERIDEX) 0.12 % solution  2/13/18   Historical Provider, MD       FAMILY HISTORY:  Family History of Breast, Ovarian, Colon or Uterine Cancer: No   family history includes Breast Cancer

## 2018-09-01 DIAGNOSIS — G89.4 PAIN SYNDROME, CHRONIC: ICD-10-CM

## 2018-09-04 ENCOUNTER — OFFICE VISIT (OUTPATIENT)
Dept: INTERNAL MEDICINE | Age: 53
End: 2018-09-04
Payer: COMMERCIAL

## 2018-09-04 VITALS
SYSTOLIC BLOOD PRESSURE: 133 MMHG | HEART RATE: 95 BPM | DIASTOLIC BLOOD PRESSURE: 87 MMHG | WEIGHT: 226 LBS | BODY MASS INDEX: 40.04 KG/M2 | HEIGHT: 63 IN

## 2018-09-04 DIAGNOSIS — E78.00 PURE HYPERCHOLESTEROLEMIA: ICD-10-CM

## 2018-09-04 DIAGNOSIS — I10 ESSENTIAL HYPERTENSION: Primary | ICD-10-CM

## 2018-09-04 DIAGNOSIS — Z12.31 ENCOUNTER FOR SCREENING MAMMOGRAM FOR BREAST CANCER: ICD-10-CM

## 2018-09-04 DIAGNOSIS — Z12.11 COLON CANCER SCREENING: ICD-10-CM

## 2018-09-04 DIAGNOSIS — E11.9 TYPE 2 DIABETES MELLITUS WITHOUT COMPLICATION, WITHOUT LONG-TERM CURRENT USE OF INSULIN (HCC): ICD-10-CM

## 2018-09-04 DIAGNOSIS — E66.01 MORBID OBESITY WITH BMI OF 40.0-44.9, ADULT (HCC): ICD-10-CM

## 2018-09-04 DIAGNOSIS — G89.29 CHRONIC HAND PAIN, UNSPECIFIED LATERALITY: ICD-10-CM

## 2018-09-04 DIAGNOSIS — M79.643 CHRONIC HAND PAIN, UNSPECIFIED LATERALITY: ICD-10-CM

## 2018-09-04 PROCEDURE — G8427 DOCREV CUR MEDS BY ELIG CLIN: HCPCS | Performed by: INTERNAL MEDICINE

## 2018-09-04 PROCEDURE — G8417 CALC BMI ABV UP PARAM F/U: HCPCS | Performed by: INTERNAL MEDICINE

## 2018-09-04 PROCEDURE — 1036F TOBACCO NON-USER: CPT | Performed by: INTERNAL MEDICINE

## 2018-09-04 PROCEDURE — 99211 OFF/OP EST MAY X REQ PHY/QHP: CPT | Performed by: INTERNAL MEDICINE

## 2018-09-04 PROCEDURE — 3017F COLORECTAL CA SCREEN DOC REV: CPT | Performed by: INTERNAL MEDICINE

## 2018-09-04 PROCEDURE — 3045F PR MOST RECENT HEMOGLOBIN A1C LEVEL 7.0-9.0%: CPT | Performed by: INTERNAL MEDICINE

## 2018-09-04 PROCEDURE — 2022F DILAT RTA XM EVC RTNOPTHY: CPT | Performed by: INTERNAL MEDICINE

## 2018-09-04 PROCEDURE — 99213 OFFICE O/P EST LOW 20 MIN: CPT | Performed by: INTERNAL MEDICINE

## 2018-09-04 RX ORDER — GLIMEPIRIDE 2 MG/1
2 TABLET ORAL EVERY MORNING
Qty: 30 TABLET | Refills: 3 | Status: SHIPPED | OUTPATIENT
Start: 2018-09-04 | End: 2020-06-26 | Stop reason: SDUPTHER

## 2018-09-04 RX ORDER — GABAPENTIN 800 MG/1
TABLET ORAL
Qty: 90 TABLET | Refills: 1 | Status: SHIPPED | OUTPATIENT
Start: 2018-09-04 | End: 2018-11-01 | Stop reason: SDUPTHER

## 2018-09-04 NOTE — PATIENT INSTRUCTIONS
Return To Clinic 1/16/2019. After Visit Summary  given and reviewed. It is very important for your care that you keep your appointment. If for some reason you are unable to keep your appointment it is equally important that you call our office at [phone] to cancel your appointment and reschedule. Failure to do so may result in your termination from our practice.     -Bloodwork orders given to patient, they will have them done before their next visit. -Mammogram ordered for patient--scheduling information given to patient to call and set up an appt  Referral to Rheumatolgoy ([facility]  given to patient with number to call, patient will call to schedule. --Andie Matthews

## 2018-09-04 NOTE — PROGRESS NOTES
Visit Information    Have you changed or started any medications since your last visit including any over-the-counter medicines, vitamins, or herbal medicines? no   Are you having any side effects from any of your medications? -  no  Have you stopped taking any of your medications? Is so, why? -  no    Have you seen any other physician or provider since your last visit? No  Have you had any other diagnostic tests since your last visit? No  Have you been seen in the emergency room and/or had an admission to a hospital since we last saw you? No  Have you had your routine dental cleaning in the past 6 months? no    Have you activated your [employer] account? If not, what are your barriers?  Yes     Patient Care Team:  El Clemente MD as PCP - General (Internal Medicine)  El Clemente MD as PCP - S Attributed Provider  Yaya Nixon MD as Referring Physician (Internal Medicine)    Medical History Review  Past Medical, Family, and Social History reviewed and does not contribute to the patient presenting condition    Health Maintenance   Topic Date Due    Diabetic microalbuminuria test  04/30/2014    Diabetic retinal exam  04/14/2017    Breast cancer screen  04/14/2017    Colon Cancer Screen FIT/FOBT  07/14/2018    Potassium monitoring  07/14/2018    Creatinine monitoring  07/14/2018    Flu vaccine (1) 09/01/2018    Shingles Vaccine (1 of 2 - 2 Dose Series) 12/26/2018 (Originally 6/28/2017)    Lipid screen  09/27/2018    Diabetic foot exam  10/05/2018    A1C test (Diabetic or Prediabetic)  04/27/2019    Cervical cancer screen  10/04/2021    DTaP/Tdap/Td vaccine (2 - Td) 11/30/2027    Pneumococcal med risk  Completed    HIV screen  Completed

## 2018-09-04 NOTE — PROGRESS NOTES
[facility]    Progress Note    Date of patient's visit: 9/4/2018    Patient's Name:  Mario Alberto Zheng    YOB: 1965            Patient Care Team:  Tata Sanchez MD as PCP - General (Internal Medicine)  Tata Sanchez MD as PCP - S Attributed Provider  Lulu Robertson MD as Referring Physician (Internal Medicine)    REASON FOR VISIT: Routine outpatient follow     Chief Complaint   Patient presents with    Annual Exam     pt state that she need a well chack for insurance     Referral - General     pt would like rheumatologist referral         HISTORY OF PRESENT ILLNESS:    History was obtained from the patient. Mario Alberto Zheng is a 48 y.o. is here for Follow-up. She continues to have pain in both hands but worse on the right. She refuses to see orthopedics and get EMG done. She had a previous EMG which showed mild carpal tunnel syndrome. She is requesting a referral to rheumatology. She thinks she has rheumatoid arthritis. Hand x-rays have been negative. .  Previously done rheumatoid factor is also negative. She states compliance with her other medications. She recently saw gynecologist and had a pelvic exam done. Pap smear is pending. She has not had lab work in some time. She plans to come back fasting.           Hand xrays  Impression   Unremarkable right hand series.       Unremarkable left hand series.       No plain film findings to indicate the etiology of the patient's hand pain.                 Past Medical History:   Diagnosis Date    Anxiety 2/23/2017    Chronic back pain     [facility]) [facility]     GERD (gastroesophageal reflux disease)     resolved not taking medds    HSV-2 (herpes simplex virus 2) infection 7/2/2013    Hyperlipidemia     Hypertension     Neuropathy     feet    Obesity     Osteoarthritis     Type II or unspecified type diabetes mellitus without mention of complication, not stated as uncontrolled     Urinary incontinence        Past Surgical History:   Procedure Laterality Date    TONSILLECTOMY AND ADENOIDECTOMY      TUBAL LIGATION           ALLERGIES      Allergies   Allergen Reactions    Latex Hives    Lisinopril      Cough      Vicodin [Hydrocodone-Acetaminophen] Hives and Itching    Zocor [Simvastatin]        MEDICATIONS:      Current Outpatient Prescriptions on File Prior to Visit   Medication Sig Dispense Refill    glimepiride (AMARYL) 2 MG tablet TAKE 1 TABLET BY MOUTH EVERY MORNING 30 tablet 3    gabapentin (NEURONTIN) 800 MG tablet TAKE 1 TABLET BY MOUTH THREE TIMES DAILY **WILL  CAUSE  DROWSINESS. DO  NOT  DRIVE  IF  ON  THIS  MEDICATION** 90 tablet 1    hydrochlorothiazide (HYDRODIURIL) 25 MG tablet TAKE ONE TABLET BY MOUTH ONCE DAILY 90 tablet 1    Glucose Blood (BLOOD GLUCOSE TEST STRIPS) STRP Test 2  times daily 100 strip 11    Lancets MISC Test 2/day 100 each 11    oxaprozin (DAYPRO) 600 MG tablet Take 1 tablet by mouth daily 30 tablet 1    amLODIPine (NORVASC) 5 MG tablet Take 1 tablet by mouth daily 30 tablet 6    metFORMIN (GLUCOPHAGE) 1000 MG tablet TAKE ONE TABLET BY MOUTH TWICE DAILY WITH FOOD 60 tablet 5    chlorhexidine (PERIDEX) 0.12 % solution        No current facility-administered medications on file prior to visit. SOCIAL HISTORY    Reviewed and no change from previous record. Belkis Gallegos  reports that she quit smoking about 21 months ago. Her smoking use included Cigarettes. She has a 20.00 pack-year smoking history.  She has never used smokeless tobacco.    FAMILY HISTORY:    Reviewed and No change from previous visit    HEALTH MAINTENANCE DUE:      Health Maintenance Due   Topic Date Due    Diabetic microalbuminuria test  04/30/2014    Diabetic retinal exam  04/14/2017    Breast cancer screen  04/14/2017    Colon Cancer Screen FIT/FOBT  07/14/2018    Potassium monitoring  07/14/2018    Creatinine monitoring 07/14/2018    Flu vaccine (1) 09/01/2018       REVIEW OF SYSTEMS:    12 point review of symptoms completed and found to be normal except noted in the HPI    ROS   Constitutional: Positive for malaise/fatigue. Negative for fever and weight loss. Eyes: Negative for blurred vision and redness. Respiratory: Negative for cough, sputum production and shortness of breath. Cardiovascular: Negative for chest pain, palpitations and leg swelling. Musculoskeletal: Positive for joint pain. Negative for back pain. Neurological: Positive for sensory change. Negative for dizziness, focal weakness and loss of consciousness. Psychiatric/Behavioral: Negative for depression and substance abuse. The patient is nervous/anxious.          PHYSICAL EXAM:      Vitals:    09/04/18 1511   BP: 133/87   Site: Left Arm   Position: Sitting   Cuff Size: Large Adult   Pulse: 95   Weight: 226 lb (102.5 kg)   Height: 5' 3\" (1.6 m)     Body mass index is 40.03 kg/m². BP Readings from Last 3 Encounters:   09/04/18 133/87   08/23/18 113/82   06/26/18 119/77        Wt Readings from Last 3 Encounters:   09/04/18 226 lb (102.5 kg)   08/23/18 222 lb (100.7 kg)   06/26/18 227 lb (103 kg)       Physical Exam    HENT:  Normocephalic, Atraumatic, Neck- Normal range of motion, No tenderness, Supple. Eyes:  PERRL, EOMI, Conjunctiva normal, No discharge. Respiratory:  Normal breath sounds, No respiratory distress, No wheezing, No chest tenderness. Cardiovascular:  Normal heart rate, Normal rhythm  GI:  Bowel sounds normal, Soft, No tenderness  Musculoskeletal:  Intact distal pulses, No edema, mild tenderness right MCP joint thumb. No wrist tenderness. No synovitis. Mucoid cyst right little finger DIP joint. tenderness, Back- No tenderness. Integument:  Warm, Dry, No erythema, No rash. Lymphatic:  No lymphadenopathy noted. Neurologic:  Alert & oriented x 3, Normal motor function, Normal sensory function, No focal deficits noted. counseling on the following healthy behaviors: nutrition, exercise and medication adherence    2. Reviewed prior labs and health maintenance. 3. Discussed use, benefit, and side effects of prescribed medications. Barriers to medication compliance addressed. All patient questions answered. Pt voiced understanding.        Eulalia Guy  Attending Physician, [address] [place] [address], Internal Medicine Residency Program  [address] [place] [address]  9/4/2018, 3:22 PM

## 2018-09-10 ENCOUNTER — TELEPHONE (OUTPATIENT)
Dept: INTERNAL MEDICINE | Age: 53
End: 2018-09-10

## 2018-09-10 DIAGNOSIS — E11.9 TYPE 2 DIABETES MELLITUS WITHOUT COMPLICATION, WITHOUT LONG-TERM CURRENT USE OF INSULIN (HCC): ICD-10-CM

## 2018-09-10 NOTE — TELEPHONE ENCOUNTER
PA received for Test strips-- per insurance    INSULIN USERS LIMITED  PER 30 DAYS    NON-INSULIN USERS LIMITED  PER 90 DAYS    This patient can not test more than once a day-- Please send new script to pharmacy with directions as once daily. Thanks!

## 2018-09-11 RX ORDER — GLUCOSAMINE HCL/CHONDROITIN SU 500-400 MG
CAPSULE ORAL
Qty: 100 STRIP | Refills: 3 | Status: SHIPPED | OUTPATIENT
Start: 2018-09-11 | End: 2020-05-20

## 2018-09-12 ENCOUNTER — HOSPITAL ENCOUNTER (OUTPATIENT)
Facility: MEDICAL CENTER | Age: 53
End: 2018-09-12
Payer: COMMERCIAL

## 2018-09-25 ENCOUNTER — HOSPITAL ENCOUNTER (OUTPATIENT)
Age: 53
Discharge: HOME | End: 2018-09-25
Payer: COMMERCIAL

## 2018-09-25 DIAGNOSIS — I10 ESSENTIAL HYPERTENSION: ICD-10-CM

## 2018-09-25 DIAGNOSIS — E78.00 PURE HYPERCHOLESTEROLEMIA: ICD-10-CM

## 2018-09-25 DIAGNOSIS — G89.29 CHRONIC HAND PAIN, UNSPECIFIED LATERALITY: ICD-10-CM

## 2018-09-25 DIAGNOSIS — E11.9 TYPE 2 DIABETES MELLITUS WITHOUT COMPLICATION, WITHOUT LONG-TERM CURRENT USE OF INSULIN (HCC): ICD-10-CM

## 2018-09-25 DIAGNOSIS — M79.643 CHRONIC HAND PAIN, UNSPECIFIED LATERALITY: ICD-10-CM

## 2018-09-25 LAB
ALBUMIN SERPL-MCNC: 4.2 G/DL (ref 3.5–5.2)
ALBUMIN/GLOBULIN RATIO: 1.2 (ref 1–2.5)
ALP BLD-CCNC: 60 U/L (ref 35–104)
ALT SERPL-CCNC: 31 U/L (ref 5–33)
ANION GAP SERPL CALCULATED.3IONS-SCNC: 15 MMOL/L (ref 9–17)
AST SERPL-CCNC: 20 U/L
BILIRUB SERPL-MCNC: 0.4 MG/DL (ref 0.3–1.2)
BUN BLDV-MCNC: 15 MG/DL (ref 6–20)
BUN/CREAT BLD: ABNORMAL (ref 9–20)
CALCIUM SERPL-MCNC: 9.9 MG/DL (ref 8.6–10.4)
CHLORIDE BLD-SCNC: 102 MMOL/L (ref 98–107)
CHOLESTEROL/HDL RATIO: 7.2
CHOLESTEROL: 265 MG/DL
CO2: 27 MMOL/L (ref 20–31)
CREAT SERPL-MCNC: 0.69 MG/DL (ref 0.5–0.9)
CREATININE URINE: 247.4 MG/DL (ref 28–217)
ESTIMATED AVERAGE GLUCOSE: 174 MG/DL
GFR AFRICAN AMERICAN: >60 ML/MIN
GFR NON-AFRICAN AMERICAN: >60 ML/MIN
GFR SERPL CREATININE-BSD FRML MDRD: ABNORMAL ML/MIN/{1.73_M2}
GFR SERPL CREATININE-BSD FRML MDRD: ABNORMAL ML/MIN/{1.73_M2}
GLUCOSE BLD-MCNC: 151 MG/DL (ref 70–99)
HBA1C MFR BLD: 7.7 % (ref 4–6)
HDLC SERPL-MCNC: 37 MG/DL
LDL CHOLESTEROL: 171 MG/DL (ref 0–130)
MICROALBUMIN/CREAT 24H UR: 30 MG/L
MICROALBUMIN/CREAT UR-RTO: 12 MCG/MG CREAT
POTASSIUM SERPL-SCNC: 4.4 MMOL/L (ref 3.7–5.3)
SODIUM BLD-SCNC: 144 MMOL/L (ref 135–144)
TOTAL PROTEIN: 7.6 G/DL (ref 6.4–8.3)
TRIGL SERPL-MCNC: 286 MG/DL
VLDLC SERPL CALC-MCNC: ABNORMAL MG/DL (ref 1–30)

## 2018-09-25 PROCEDURE — 86200 CCP ANTIBODY: CPT

## 2018-09-25 PROCEDURE — 36415 COLL VENOUS BLD VENIPUNCTURE: CPT

## 2018-09-25 PROCEDURE — 82043 UR ALBUMIN QUANTITATIVE: CPT

## 2018-09-25 PROCEDURE — 82570 ASSAY OF URINE CREATININE: CPT

## 2018-09-25 PROCEDURE — 80061 LIPID PANEL: CPT

## 2018-09-25 PROCEDURE — 83036 HEMOGLOBIN GLYCOSYLATED A1C: CPT

## 2018-09-25 PROCEDURE — 80053 COMPREHEN METABOLIC PANEL: CPT

## 2018-09-26 LAB — CCP IGG ANTIBODIES: <1.5 U/ML

## 2018-09-28 RX ORDER — ATORVASTATIN CALCIUM 10 MG/1
10 TABLET, FILM COATED ORAL DAILY
Qty: 30 TABLET | Refills: 6 | Status: SHIPPED | OUTPATIENT
Start: 2018-09-28 | End: 2019-03-12 | Stop reason: SDUPTHER

## 2018-10-11 ENCOUNTER — TELEPHONE (OUTPATIENT)
Dept: INTERNAL MEDICINE | Age: 53
End: 2018-10-11

## 2018-11-01 ENCOUNTER — TELEPHONE (OUTPATIENT)
Dept: INTERNAL MEDICINE | Age: 53
End: 2018-11-01

## 2018-11-01 DIAGNOSIS — M79.641 RIGHT HAND PAIN: ICD-10-CM

## 2018-11-01 DIAGNOSIS — G89.4 PAIN SYNDROME, CHRONIC: ICD-10-CM

## 2018-11-01 DIAGNOSIS — I10 ESSENTIAL HYPERTENSION: ICD-10-CM

## 2018-11-01 RX ORDER — HYDROCHLOROTHIAZIDE 25 MG/1
TABLET ORAL
Qty: 90 TABLET | Refills: 1 | Status: SHIPPED | OUTPATIENT
Start: 2018-11-01 | End: 2019-03-12 | Stop reason: SDUPTHER

## 2018-11-01 RX ORDER — AMLODIPINE BESYLATE 5 MG/1
5 TABLET ORAL DAILY
Qty: 90 TABLET | Refills: 0 | Status: SHIPPED | OUTPATIENT
Start: 2018-11-01 | End: 2019-02-07 | Stop reason: SDUPTHER

## 2018-11-01 RX ORDER — GABAPENTIN 800 MG/1
TABLET ORAL
Qty: 90 TABLET | Refills: 1 | Status: SHIPPED | OUTPATIENT
Start: 2018-11-03 | End: 2019-03-12

## 2018-11-01 NOTE — LETTER
MAN[address]  [address] [facility]  Phone: [phone]  Fax: [phone]    Sunitha Gregory MD        November 1, 2018    Teresa Pulse  [address] [facility]      Dear Getachew Hodge:    During your last appointment with us on 9/4/18 with Dr. Ashli Gimenez, you were given the OC-Light hemoccult test kit to provide a sample of your stool in lieu of scheduling a colonoscopy. The kit has not yet been returned to us. Please follow the instructions on the envelope provided to you with the kit and return it to the clinic either by mail or in person at your earliest convenience. We are on the 2nd floor. Keep in mind that once a sample has been collected, it is good for up to 15 days in room temperature, or 30 days refrigerated. If you have any questions or concerns, please don't hesitate to call.     Sincerely,        Sunitha Gregory MD

## 2018-11-02 DIAGNOSIS — M79.641 RIGHT HAND PAIN: ICD-10-CM

## 2018-11-02 NOTE — TELEPHONE ENCOUNTER
rajendra request for OXAPROZIN 600MG TAB future appointment scheduled. Health Maintenance   Topic Date Due    Diabetic retinal exam  04/14/2017    Breast cancer screen  04/14/2017    Colon Cancer Screen FIT/FOBT  07/14/2018    Flu vaccine (1) 09/01/2018    Diabetic foot exam  10/05/2018    Shingles Vaccine (1 of 2 - 2 Dose Series) 12/26/2018 (Originally 6/28/2017)    A1C test (Diabetic or Prediabetic)  09/25/2019    Diabetic microalbuminuria test  09/25/2019    Lipid screen  09/25/2019    Potassium monitoring  09/25/2019    Creatinine monitoring  09/25/2019    Cervical cancer screen  10/04/2021    DTaP/Tdap/Td vaccine (2 - Td) 11/30/2027    Pneumococcal med risk  Completed    HIV screen  Completed             (applicable per patient's age: Cancer Screenings, Depression Screening, Fall Risk Screening, Immunizations)    Hemoglobin A1C (%)   Date Value   09/25/2018 7.7 (H)   04/27/2018 7.8   09/27/2017 7.1 (H)     Microalb/Crt.  Ratio (mcg/mg creat)   Date Value   09/25/2018 12     LDL Cholesterol (mg/dL)   Date Value   09/25/2018 171 (H)     AST (U/L)   Date Value   09/25/2018 20     ALT (U/L)   Date Value   09/25/2018 31     BUN (mg/dL)   Date Value   09/25/2018 15      (goal A1C is < 7)   (goal LDL is <100) need 30-50% reduction from baseline     BP Readings from Last 3 Encounters:   09/04/18 133/87   08/23/18 113/82   06/26/18 119/77    (goal /80)      All Future Testing planned in [facility]:  Lab Frequency Next Occurrence   EMG Once 11/22/2018   XR HAND LEFT (MIN 3 VIEWS) Once 11/11/2018   ISAAC DIGITAL SCREEN W CAD BILATERAL Once 09/13/2018   POCT Fecal Immunochemical Test (FIT) Once 11/15/2018       Next Visit Date:  Future Appointments  Date Time Provider Nathalie Barraza   1/16/2019 2:30 PM Olivia Whitley MD [facility]            Patient Active Problem List:     DM (diabetes mellitus) ([facility] [address]     Obesity, morbid ([address]     Hyperlipidemia     Pain syndrome, chronic     Anxiety [place] node     Mucoid cyst of joint     Post-traumatic osteoarthritis of left hand     Essential hypertension

## 2018-11-13 ENCOUNTER — TELEPHONE (OUTPATIENT)
Dept: INTERNAL MEDICINE | Age: 53
End: 2018-11-13

## 2018-11-13 DIAGNOSIS — G47.33 OSA (OBSTRUCTIVE SLEEP APNEA): Primary | ICD-10-CM

## 2018-11-13 NOTE — TELEPHONE ENCOUNTER
Pt called said she needs new referral placed to have 2nd part of her Sleep Study done because the other one in the system is to old . please place new referral for pt to get Sleep Study scheduled    Health Maintenance   Topic Date Due    Diabetic retinal exam  04/14/2017    Breast cancer screen  04/14/2017    Colon Cancer Screen FIT/FOBT  07/14/2018    Flu vaccine (1) 09/01/2018    Diabetic foot exam  10/05/2018    Shingles Vaccine (1 of 2 - 2 Dose Series) 12/26/2018 (Originally 6/28/2017)    A1C test (Diabetic or Prediabetic)  09/25/2019    Diabetic microalbuminuria test  09/25/2019    Lipid screen  09/25/2019    Potassium monitoring  09/25/2019    Creatinine monitoring  09/25/2019    Cervical cancer screen  10/04/2021    DTaP/Tdap/Td vaccine (2 - Td) 11/30/2027    Pneumococcal med risk  Completed    HIV screen  Completed             (applicable per patient's age: Cancer Screenings, Depression Screening, Fall Risk Screening, Immunizations)    Hemoglobin A1C (%)   Date Value   09/25/2018 7.7 (H)   04/27/2018 7.8   09/27/2017 7.1 (H)     Microalb/Crt.  Ratio (mcg/mg creat)   Date Value   09/25/2018 12     LDL Cholesterol (mg/dL)   Date Value   09/25/2018 171 (H)     AST (U/L)   Date Value   09/25/2018 20     ALT (U/L)   Date Value   09/25/2018 31     BUN (mg/dL)   Date Value   09/25/2018 15      (goal A1C is < 7)   (goal LDL is <100) need 30-50% reduction from baseline     BP Readings from Last 3 Encounters:   09/04/18 133/87   08/23/18 113/82   06/26/18 119/77    (goal /80)      All Future Testing planned in [facility]:  Lab Frequency Next Occurrence   EMG Once 11/22/2018   XR HAND LEFT (MIN 3 VIEWS) Once 11/11/2018   ISAAC DIGITAL SCREEN W CAD BILATERAL Once 09/13/2018   POCT Fecal Immunochemical Test (FIT) Once 11/15/2018       Next Visit Date:  Future Appointments  Date Time Provider Nathalie Barraza   11/16/2018 12:30 PM SCHEDULE, [facility]   1/16/2019 2:30 PM Jai Bull

## 2018-11-14 NOTE — TELEPHONE ENCOUNTER
Spoke with pt and told that the referral for the Sleep Study was placed and she can call them and get appt scheduled. pt said ok

## 2018-11-19 ENCOUNTER — OFFICE VISIT (OUTPATIENT)
Dept: PODIATRY | Age: 53
End: 2018-11-19
Payer: COMMERCIAL

## 2018-11-19 VITALS — HEIGHT: 63 IN | WEIGHT: 220 LBS | BODY MASS INDEX: 38.98 KG/M2

## 2018-11-19 DIAGNOSIS — E11.9 TYPE 2 DIABETES MELLITUS WITHOUT COMPLICATION, WITHOUT LONG-TERM CURRENT USE OF INSULIN (HCC): ICD-10-CM

## 2018-11-19 DIAGNOSIS — M79.675 PAIN IN TOE OF LEFT FOOT: ICD-10-CM

## 2018-11-19 DIAGNOSIS — L84 CORNS AND CALLOSITIES: Primary | ICD-10-CM

## 2018-11-19 DIAGNOSIS — M21.969 FOOT DEFORMITY, UNSPECIFIED LATERALITY: ICD-10-CM

## 2018-11-19 PROCEDURE — 3045F PR MOST RECENT HEMOGLOBIN A1C LEVEL 7.0-9.0%: CPT | Performed by: PODIATRIST

## 2018-11-19 PROCEDURE — 1036F TOBACCO NON-USER: CPT | Performed by: PODIATRIST

## 2018-11-19 PROCEDURE — 3017F COLORECTAL CA SCREEN DOC REV: CPT | Performed by: PODIATRIST

## 2018-11-19 PROCEDURE — G8417 CALC BMI ABV UP PARAM F/U: HCPCS | Performed by: PODIATRIST

## 2018-11-19 PROCEDURE — G8484 FLU IMMUNIZE NO ADMIN: HCPCS | Performed by: PODIATRIST

## 2018-11-19 PROCEDURE — 2022F DILAT RTA XM EVC RTNOPTHY: CPT | Performed by: PODIATRIST

## 2018-11-19 PROCEDURE — G8427 DOCREV CUR MEDS BY ELIG CLIN: HCPCS | Performed by: PODIATRIST

## 2018-11-19 PROCEDURE — 99213 OFFICE O/P EST LOW 20 MIN: CPT | Performed by: PODIATRIST

## 2018-11-19 PROCEDURE — 11055 PARING/CUTG B9 HYPRKER LES 1: CPT | Performed by: PODIATRIST

## 2018-11-19 RX ORDER — TIZANIDINE 2 MG/1
TABLET ORAL
COMMUNITY
Start: 2018-11-08 | End: 2020-07-23 | Stop reason: DRUGHIGH

## 2018-11-19 RX ORDER — AMITRIPTYLINE HYDROCHLORIDE 10 MG/1
TABLET, FILM COATED ORAL
COMMUNITY
Start: 2018-11-08 | End: 2020-06-26 | Stop reason: ALTCHOICE

## 2018-11-19 RX ORDER — MELOXICAM 15 MG/1
TABLET ORAL
COMMUNITY
Start: 2018-11-08 | End: 2019-03-12

## 2018-11-28 ASSESSMENT — ENCOUNTER SYMPTOMS
BACK PAIN: 0
SHORTNESS OF BREATH: 0
NAUSEA: 0
COLOR CHANGE: 0
DIARRHEA: 0

## 2018-11-28 NOTE — PROGRESS NOTES
[address] [facility]  Return Patient Progress Note    Subjective: Teresa Pulse 48 y.o. female that presents with chief complaint of painful callus to her left foot. Chief Complaint   Patient presents with   Antoniette Nim     left foot     Patient States that this has been present for several weeks. Patient states that the callus is on her left big toe. Patient states that this callus is painful with pressure and walking. Pain is rated 5 out of 10 and is described as intermittent. Patient denies any injury to this area of her left foot. Review of Systems   Constitutional: Negative for activity change, appetite change, chills, diaphoresis, fatigue and fever. Respiratory: Negative for shortness of breath. Cardiovascular: Negative for leg swelling. Gastrointestinal: Negative for diarrhea and nausea. Endocrine: Negative for cold intolerance, heat intolerance and polyuria. Musculoskeletal: Positive for arthralgias. Negative for back pain, gait problem, joint swelling and myalgias. Skin: Negative for color change, pallor, rash and wound. Allergic/Immunologic: Negative for environmental allergies and food allergies. Neurological: Negative for dizziness, weakness, light-headedness and numbness. Hematological: Does not bruise/bleed easily. Psychiatric/Behavioral: Negative for behavioral problems, confusion and self-injury. The patient is not nervous/anxious. Objective: Clinical evaluation of the patient reveals hyperkeratotic tissue growth to the plantar medial aspect of the left hallux. There is pain with direct palpation of this lesion. There is no surrounding erythema or calor noted. Debridement of this callus with a 15 blade does not reveal any central core or pinpoint bleeding. There is no drainage, malodor, or open wound noted. Pain with debridement of this lesion. There are contractures noted to the lesser toes of the bilateral feet.   There is pain with manipulation and

## 2019-02-07 DIAGNOSIS — I10 ESSENTIAL HYPERTENSION: ICD-10-CM

## 2019-02-08 RX ORDER — AMLODIPINE BESYLATE 5 MG/1
TABLET ORAL
Qty: 90 TABLET | Refills: 0 | Status: SHIPPED | OUTPATIENT
Start: 2019-02-08 | End: 2019-03-12 | Stop reason: SDUPTHER

## 2019-03-12 ENCOUNTER — OFFICE VISIT (OUTPATIENT)
Dept: INTERNAL MEDICINE | Age: 54
End: 2019-03-12
Payer: COMMERCIAL

## 2019-03-12 VITALS
BODY MASS INDEX: 38.55 KG/M2 | SYSTOLIC BLOOD PRESSURE: 149 MMHG | HEART RATE: 94 BPM | DIASTOLIC BLOOD PRESSURE: 97 MMHG | WEIGHT: 217.6 LBS | HEIGHT: 63 IN

## 2019-03-12 DIAGNOSIS — R05.3 CHRONIC COUGH: ICD-10-CM

## 2019-03-12 DIAGNOSIS — I10 ESSENTIAL HYPERTENSION: ICD-10-CM

## 2019-03-12 DIAGNOSIS — E53.8 LOW VITAMIN B12 LEVEL: ICD-10-CM

## 2019-03-12 DIAGNOSIS — F17.200 SMOKER: ICD-10-CM

## 2019-03-12 DIAGNOSIS — Z79.4 TYPE 2 DIABETES MELLITUS WITHOUT COMPLICATION, WITH LONG-TERM CURRENT USE OF INSULIN (HCC): Primary | ICD-10-CM

## 2019-03-12 DIAGNOSIS — M79.7 FIBROMYALGIA: ICD-10-CM

## 2019-03-12 DIAGNOSIS — E78.00 PURE HYPERCHOLESTEROLEMIA: ICD-10-CM

## 2019-03-12 DIAGNOSIS — G47.33 OSA (OBSTRUCTIVE SLEEP APNEA): ICD-10-CM

## 2019-03-12 DIAGNOSIS — E11.9 TYPE 2 DIABETES MELLITUS WITHOUT COMPLICATION, WITH LONG-TERM CURRENT USE OF INSULIN (HCC): Primary | ICD-10-CM

## 2019-03-12 DIAGNOSIS — E66.01 CLASS 2 SEVERE OBESITY DUE TO EXCESS CALORIES WITH SERIOUS COMORBIDITY AND BODY MASS INDEX (BMI) OF 38.0 TO 38.9 IN ADULT (HCC): ICD-10-CM

## 2019-03-12 LAB — HBA1C MFR BLD: 7.6 %

## 2019-03-12 PROCEDURE — 3017F COLORECTAL CA SCREEN DOC REV: CPT | Performed by: INTERNAL MEDICINE

## 2019-03-12 PROCEDURE — G8427 DOCREV CUR MEDS BY ELIG CLIN: HCPCS | Performed by: INTERNAL MEDICINE

## 2019-03-12 PROCEDURE — 99214 OFFICE O/P EST MOD 30 MIN: CPT | Performed by: INTERNAL MEDICINE

## 2019-03-12 PROCEDURE — 1036F TOBACCO NON-USER: CPT | Performed by: INTERNAL MEDICINE

## 2019-03-12 PROCEDURE — 2022F DILAT RTA XM EVC RTNOPTHY: CPT | Performed by: INTERNAL MEDICINE

## 2019-03-12 PROCEDURE — G8417 CALC BMI ABV UP PARAM F/U: HCPCS | Performed by: INTERNAL MEDICINE

## 2019-03-12 PROCEDURE — 99211 OFF/OP EST MAY X REQ PHY/QHP: CPT | Performed by: INTERNAL MEDICINE

## 2019-03-12 PROCEDURE — G8484 FLU IMMUNIZE NO ADMIN: HCPCS | Performed by: INTERNAL MEDICINE

## 2019-03-12 PROCEDURE — 83036 HEMOGLOBIN GLYCOSYLATED A1C: CPT | Performed by: INTERNAL MEDICINE

## 2019-03-12 PROCEDURE — 3045F PR MOST RECENT HEMOGLOBIN A1C LEVEL 7.0-9.0%: CPT | Performed by: INTERNAL MEDICINE

## 2019-03-12 RX ORDER — AMLODIPINE BESYLATE 10 MG/1
TABLET ORAL
Qty: 90 TABLET | Refills: 1 | Status: SHIPPED | OUTPATIENT
Start: 2019-03-12 | End: 2019-06-15 | Stop reason: SDUPTHER

## 2019-03-12 RX ORDER — PREGABALIN 150 MG/1
150 CAPSULE ORAL
COMMUNITY
Start: 2019-02-11 | End: 2020-07-23 | Stop reason: DRUGHIGH

## 2019-03-12 RX ORDER — HYDROCHLOROTHIAZIDE 25 MG/1
TABLET ORAL
Qty: 90 TABLET | Refills: 1 | Status: SHIPPED | OUTPATIENT
Start: 2019-03-12 | End: 2019-09-19 | Stop reason: SDUPTHER

## 2019-03-12 RX ORDER — CYANOCOBALAMIN 1000 UG/ML
1000 INJECTION INTRAMUSCULAR; SUBCUTANEOUS ONCE
Status: COMPLETED | OUTPATIENT
Start: 2019-03-12 | End: 2019-03-12

## 2019-03-12 RX ORDER — ATORVASTATIN CALCIUM 20 MG/1
20 TABLET, FILM COATED ORAL DAILY
Qty: 30 TABLET | Refills: 6 | Status: SHIPPED | OUTPATIENT
Start: 2019-03-12 | End: 2020-03-11

## 2019-03-12 RX ADMIN — CYANOCOBALAMIN 1000 MCG: 1000 INJECTION INTRAMUSCULAR; SUBCUTANEOUS at 17:11

## 2019-03-12 ASSESSMENT — PATIENT HEALTH QUESTIONNAIRE - PHQ9
2. FEELING DOWN, DEPRESSED OR HOPELESS: 1
SUM OF ALL RESPONSES TO PHQ9 QUESTIONS 1 & 2: 2
1. LITTLE INTEREST OR PLEASURE IN DOING THINGS: 1
SUM OF ALL RESPONSES TO PHQ QUESTIONS 1-9: 2
SUM OF ALL RESPONSES TO PHQ QUESTIONS 1-9: 2

## 2019-03-17 ASSESSMENT — ENCOUNTER SYMPTOMS
BACK PAIN: 0
WHEEZING: 0
BLOOD IN STOOL: 0
NAUSEA: 0
CONSTIPATION: 0
SINUS PAIN: 0
ABDOMINAL PAIN: 0
RHINORRHEA: 0
SHORTNESS OF BREATH: 1
COUGH: 1

## 2019-03-18 ENCOUNTER — TELEPHONE (OUTPATIENT)
Dept: INTERNAL MEDICINE | Age: 54
End: 2019-03-18

## 2019-03-29 ENCOUNTER — OFFICE VISIT (OUTPATIENT)
Dept: INTERNAL MEDICINE | Age: 54
End: 2019-03-29
Payer: COMMERCIAL

## 2019-03-29 VITALS
WEIGHT: 223 LBS | SYSTOLIC BLOOD PRESSURE: 123 MMHG | HEART RATE: 90 BPM | DIASTOLIC BLOOD PRESSURE: 78 MMHG | HEIGHT: 63 IN | BODY MASS INDEX: 39.51 KG/M2

## 2019-03-29 DIAGNOSIS — E11.9 TYPE 2 DIABETES MELLITUS WITHOUT COMPLICATION, WITHOUT LONG-TERM CURRENT USE OF INSULIN (HCC): ICD-10-CM

## 2019-03-29 DIAGNOSIS — I10 ESSENTIAL HYPERTENSION: ICD-10-CM

## 2019-03-29 DIAGNOSIS — Z02.0 SCHOOL PHYSICAL EXAM: Primary | ICD-10-CM

## 2019-03-29 DIAGNOSIS — Z11.1 SCREENING-PULMONARY TB: ICD-10-CM

## 2019-03-29 PROCEDURE — G8484 FLU IMMUNIZE NO ADMIN: HCPCS | Performed by: STUDENT IN AN ORGANIZED HEALTH CARE EDUCATION/TRAINING PROGRAM

## 2019-03-29 PROCEDURE — 2022F DILAT RTA XM EVC RTNOPTHY: CPT | Performed by: STUDENT IN AN ORGANIZED HEALTH CARE EDUCATION/TRAINING PROGRAM

## 2019-03-29 PROCEDURE — 3045F PR MOST RECENT HEMOGLOBIN A1C LEVEL 7.0-9.0%: CPT | Performed by: STUDENT IN AN ORGANIZED HEALTH CARE EDUCATION/TRAINING PROGRAM

## 2019-03-29 PROCEDURE — 99203 OFFICE O/P NEW LOW 30 MIN: CPT | Performed by: STUDENT IN AN ORGANIZED HEALTH CARE EDUCATION/TRAINING PROGRAM

## 2019-03-29 PROCEDURE — 86580 TB INTRADERMAL TEST: CPT | Performed by: STUDENT IN AN ORGANIZED HEALTH CARE EDUCATION/TRAINING PROGRAM

## 2019-03-29 PROCEDURE — 99211 OFF/OP EST MAY X REQ PHY/QHP: CPT | Performed by: INTERNAL MEDICINE

## 2019-03-29 PROCEDURE — 3017F COLORECTAL CA SCREEN DOC REV: CPT | Performed by: STUDENT IN AN ORGANIZED HEALTH CARE EDUCATION/TRAINING PROGRAM

## 2019-03-29 PROCEDURE — 1036F TOBACCO NON-USER: CPT | Performed by: STUDENT IN AN ORGANIZED HEALTH CARE EDUCATION/TRAINING PROGRAM

## 2019-03-29 PROCEDURE — G8427 DOCREV CUR MEDS BY ELIG CLIN: HCPCS | Performed by: STUDENT IN AN ORGANIZED HEALTH CARE EDUCATION/TRAINING PROGRAM

## 2019-03-29 PROCEDURE — G8417 CALC BMI ABV UP PARAM F/U: HCPCS | Performed by: STUDENT IN AN ORGANIZED HEALTH CARE EDUCATION/TRAINING PROGRAM

## 2019-04-01 ENCOUNTER — NURSE ONLY (OUTPATIENT)
Dept: INTERNAL MEDICINE | Age: 54
End: 2019-04-01
Payer: COMMERCIAL

## 2019-04-01 ENCOUNTER — HOSPITAL ENCOUNTER (OUTPATIENT)
Age: 54
Setting detail: SPECIMEN
Discharge: HOME | End: 2019-04-01
Payer: COMMERCIAL

## 2019-04-01 DIAGNOSIS — Z11.1 ENCOUNTER FOR PPD SKIN TEST READING: ICD-10-CM

## 2019-04-01 DIAGNOSIS — E53.8 LOW VITAMIN B12 LEVEL: ICD-10-CM

## 2019-04-01 LAB
ABSOLUTE EOS #: 0.3 K/UL (ref 0–0.44)
ABSOLUTE IMMATURE GRANULOCYTE: 0.03 K/UL (ref 0–0.3)
ABSOLUTE LYMPH #: 4.34 K/UL (ref 1.1–3.7)
ABSOLUTE MONO #: 0.56 K/UL (ref 0.1–1.2)
BASOPHILS # BLD: 1 % (ref 0–2)
BASOPHILS ABSOLUTE: 0.07 K/UL (ref 0–0.2)
DIFFERENTIAL TYPE: ABNORMAL
EOSINOPHILS RELATIVE PERCENT: 3 % (ref 1–4)
FOLATE: 7.3 NG/ML
HCT VFR BLD CALC: 38.9 % (ref 36.3–47.1)
HEMOGLOBIN: 13.1 G/DL (ref 11.9–15.1)
IMMATURE GRANULOCYTES: 0 %
INDURATION: NORMAL
LYMPHOCYTES # BLD: 41 % (ref 24–43)
MCH RBC QN AUTO: 33.8 PG (ref 25.2–33.5)
MCHC RBC AUTO-ENTMCNC: 33.7 G/DL (ref 28.4–34.8)
MCV RBC AUTO: 100.3 FL (ref 82.6–102.9)
MONOCYTES # BLD: 5 % (ref 3–12)
NRBC AUTOMATED: 0 PER 100 WBC
PDW BLD-RTO: 11.9 % (ref 11.8–14.4)
PLATELET # BLD: 276 K/UL (ref 138–453)
PLATELET ESTIMATE: ABNORMAL
PMV BLD AUTO: 10.7 FL (ref 8.1–13.5)
RBC # BLD: 3.88 M/UL (ref 3.95–5.11)
RBC # BLD: ABNORMAL 10*6/UL
SEG NEUTROPHILS: 50 % (ref 36–65)
SEGMENTED NEUTROPHILS ABSOLUTE COUNT: 5.18 K/UL (ref 1.5–8.1)
TB SKIN TEST: NORMAL
VITAMIN B-12: 362 PG/ML (ref 232–1245)
WBC # BLD: 10.5 K/UL (ref 3.5–11.3)
WBC # BLD: ABNORMAL 10*3/UL

## 2019-04-01 PROCEDURE — 82746 ASSAY OF FOLIC ACID SERUM: CPT

## 2019-04-01 PROCEDURE — 85025 COMPLETE CBC W/AUTO DIFF WBC: CPT

## 2019-04-01 PROCEDURE — 36415 COLL VENOUS BLD VENIPUNCTURE: CPT

## 2019-04-01 PROCEDURE — 82607 VITAMIN B-12: CPT

## 2019-04-01 PROCEDURE — 99211 OFF/OP EST MAY X REQ PHY/QHP: CPT | Performed by: INTERNAL MEDICINE

## 2019-04-08 ENCOUNTER — TELEPHONE (OUTPATIENT)
Dept: INTERNAL MEDICINE | Age: 54
End: 2019-04-08

## 2019-04-08 NOTE — TELEPHONE ENCOUNTER
Escribe request for Metformin . Please escribe if appropriate      Next Visit Date:  4/23/19     Health Maintenance   Topic Date Due    Hepatitis B Vaccine (1 of 3 - Risk 3-dose series) 06/28/1986    Diabetic retinal exam  04/14/2017    Breast cancer screen  04/14/2017    Shingles Vaccine (1 of 2) 06/28/2017    Colon Cancer Screen FIT/FOBT  07/14/2018    Diabetic foot exam  10/05/2018    Flu vaccine (Season Ended) 09/01/2019    Diabetic microalbuminuria test  09/25/2019    Lipid screen  09/25/2019    Potassium monitoring  09/25/2019    Creatinine monitoring  09/25/2019    A1C test (Diabetic or Prediabetic)  03/12/2020    Cervical cancer screen  10/04/2021    DTaP/Tdap/Td vaccine (2 - Td) 11/30/2027    Pneumococcal 0-64 years at Risk Vaccine  Completed    HIV screen  Completed       Hemoglobin A1C (%)   Date Value   03/12/2019 7.6   09/25/2018 7.7 (H)   04/27/2018 7.8             ( goal A1C is < 7)   Microalb/Crt.  Ratio (mcg/mg creat)   Date Value   09/25/2018 12     LDL Cholesterol (mg/dL)   Date Value   09/25/2018 171 (H)       (goal LDL is <100)   AST (U/L)   Date Value   09/25/2018 20     ALT (U/L)   Date Value   09/25/2018 31     BUN (mg/dL)   Date Value   09/25/2018 15     BP Readings from Last 3 Encounters:   03/29/19 123/78   03/12/19 (!) 149/97   09/04/18 133/87          (goal 120/80)          Patient Active Problem List:     DM (diabetes mellitus) ([facility] [address]     Class 2 severe obesity due to excess calories with serious comorbidity and body mass index (BMI) of 38.0 to 38.9 in adult [facility])     Hyperlipidemia     Pain syndrome, chronic     Anxiety     [place] node     Mucoid cyst of joint     Post-traumatic osteoarthritis of left hand     Essential hypertension     Fibromyalgia

## 2019-05-03 ENCOUNTER — TELEPHONE (OUTPATIENT)
Dept: INTERNAL MEDICINE | Age: 54
End: 2019-05-03

## 2019-05-22 ENCOUNTER — OFFICE VISIT (OUTPATIENT)
Dept: INTERNAL MEDICINE | Age: 54
End: 2019-05-22
Payer: COMMERCIAL

## 2019-05-22 VITALS
HEIGHT: 63 IN | TEMPERATURE: 98.1 F | SYSTOLIC BLOOD PRESSURE: 129 MMHG | WEIGHT: 228 LBS | DIASTOLIC BLOOD PRESSURE: 81 MMHG | HEART RATE: 83 BPM | RESPIRATION RATE: 22 BRPM | OXYGEN SATURATION: 99 % | BODY MASS INDEX: 40.4 KG/M2

## 2019-05-22 DIAGNOSIS — I10 ESSENTIAL HYPERTENSION: Primary | ICD-10-CM

## 2019-05-22 DIAGNOSIS — G47.33 OSA (OBSTRUCTIVE SLEEP APNEA): ICD-10-CM

## 2019-05-22 DIAGNOSIS — E66.01 MORBID OBESITY WITH BMI OF 40.0-44.9, ADULT (HCC): ICD-10-CM

## 2019-05-22 DIAGNOSIS — E53.8 LOW VITAMIN B12 LEVEL: ICD-10-CM

## 2019-05-22 DIAGNOSIS — M79.7 FIBROMYALGIA: ICD-10-CM

## 2019-05-22 DIAGNOSIS — E78.00 PURE HYPERCHOLESTEROLEMIA: ICD-10-CM

## 2019-05-22 DIAGNOSIS — R53.82 CHRONIC FATIGUE: ICD-10-CM

## 2019-05-22 PROCEDURE — 99214 OFFICE O/P EST MOD 30 MIN: CPT | Performed by: INTERNAL MEDICINE

## 2019-05-22 PROCEDURE — G8417 CALC BMI ABV UP PARAM F/U: HCPCS | Performed by: INTERNAL MEDICINE

## 2019-05-22 PROCEDURE — 99211 OFF/OP EST MAY X REQ PHY/QHP: CPT | Performed by: INTERNAL MEDICINE

## 2019-05-22 PROCEDURE — 2022F DILAT RTA XM EVC RTNOPTHY: CPT | Performed by: INTERNAL MEDICINE

## 2019-05-22 PROCEDURE — G8427 DOCREV CUR MEDS BY ELIG CLIN: HCPCS | Performed by: INTERNAL MEDICINE

## 2019-05-22 PROCEDURE — 3017F COLORECTAL CA SCREEN DOC REV: CPT | Performed by: INTERNAL MEDICINE

## 2019-05-22 PROCEDURE — 1036F TOBACCO NON-USER: CPT | Performed by: INTERNAL MEDICINE

## 2019-05-22 PROCEDURE — 3045F PR MOST RECENT HEMOGLOBIN A1C LEVEL 7.0-9.0%: CPT | Performed by: INTERNAL MEDICINE

## 2019-05-22 RX ORDER — LANOLIN ALCOHOL/MO/W.PET/CERES
1000 CREAM (GRAM) TOPICAL DAILY
Qty: 30 TABLET | Refills: 3 | Status: SHIPPED | OUTPATIENT
Start: 2019-05-22 | End: 2019-06-18

## 2019-05-22 RX ORDER — CYANOCOBALAMIN 1000 UG/ML
1000 INJECTION INTRAMUSCULAR; SUBCUTANEOUS ONCE
Status: SHIPPED | OUTPATIENT
Start: 2019-05-22

## 2019-05-22 RX ORDER — FLUTICASONE PROPIONATE 50 MCG
1 SPRAY, SUSPENSION (ML) NASAL DAILY
Qty: 2 BOTTLE | Refills: 1 | Status: SHIPPED | OUTPATIENT
Start: 2019-05-22 | End: 2020-06-26 | Stop reason: ALTCHOICE

## 2019-05-22 NOTE — PATIENT INSTRUCTIONS
Medications e-scribe to pharmacy of pt's choice. Labs given to patient, they will have them done before their next visit. Order for Sleep Study faxed to [address] [place] [address] they will all pt for appt. Please call [phone] in not heard within 2 weeks. Referral to Pain Management was placed, summary of care printed and faxed to office, phone numbers given to pt, they will contact office for an appt    Patient will be contacted to schedule their next appointment.

## 2019-05-22 NOTE — PROGRESS NOTES
[facility]    Progress Note    Date of patient's visit: 5/22/2019    Patient's Name:  Dayne Murrieta    YOB: 1965            Patient Care Team:  Cassy Herbert MD as PCP - General (Internal Medicine)  Cassy Herbret MD as PCP - S Attributed Provider  Josefina Loza MD as Referring Physician (Internal Medicine)    REASON FOR VISIT: Routine outpatient follow     Chief Complaint   Patient presents with    Sleep Apnea     needs note to include 2 sleep symptoms so they can get other part of sleep study completed.  Generalized Body Aches     body hurts all the time         HISTORY OF PRESENT ILLNESS:    History was obtained from the patient. Dayne Murrieta is a 48 y.o. is here for follow-up. She continues to have myalgias all over. She has been diagnosed with fibromyalgia. She also lost her arthritis of her hands. She is seeing rheumatology. She saw them recently and her Lyrica and tizanidine was increased. She does not feel it's helping. She has not done any therapy. Labs also show a low vitamin B12. She has poor sleep pattern. She is complaining of chronic fatigue. She has also noticed more apneic spells at night which wake her up gasping. She had a sleep study that showed mild sleep apnea last year but did not follow-up with the second part of her sleep study. Now she would like to go back and have it done. She has chronic fatigue, apneic spells at night, snoring, and abnormal sleep study last year. She also continues to smoke. She is complaining of nasal congestion and postnasal drip. Drainage is clear or mucosy. In terms of worsening morning when she gets up and she has to cough up mucus. No shortness of breath.     Vitamin B12 (04/24/2019 10:07 AM EDT)  Vitamin B12 (04/24/2019 10:07 AM EDT)   Component Value Ref Range Performed At [facility]ologist [facility]   Vitamin B-12 205 180 - 914 pg/mL [facility]       Vitamin B12 (04/24/2019 10:07 AM EDT)   Specimen         Vitamin B12 (04/24/2019 10:07 AM EDT)   Performing Organization Address City/State/Zipcode Phone Number   [address]  [address] [facility], [address]                 Past Medical History:   Diagnosis Date    Anxiety 2/23/2017    Chronic back pain     COPD (chronic obstructive pulmonary disease) (HCC)     Fibromyalgia 3/12/2019    GERD (gastroesophageal reflux disease)     resolved not taking medds    HSV-2 (herpes simplex virus 2) infection 7/2/2013    Hyperlipidemia     Hypertension     Neuropathy     feet    Obesity     Osteoarthritis     Type II or unspecified type diabetes mellitus without mention of complication, not stated as uncontrolled     Urinary incontinence        Past Surgical History:   Procedure Laterality Date    TONSILLECTOMY AND ADENOIDECTOMY      TUBAL LIGATION           ALLERGIES      Allergies   Allergen Reactions    Latex Hives    Lisinopril      Cough      Vicodin [Hydrocodone-Acetaminophen] Hives and Itching    Zocor [Simvastatin]        MEDICATIONS:      Current Outpatient Medications on File Prior to Visit   Medication Sig Dispense Refill    metFORMIN (GLUCOPHAGE) 1000 MG tablet TAKE 1 TABLET BY MOUTH TWICE DAILY WITH FOOD 180 tablet 3    pregabalin (LYRICA) 150 MG capsule Take 150 mg by mouth.  Take 1 capsule by mouth BID      atorvastatin (LIPITOR) 20 MG tablet Take 1 tablet by mouth daily 30 tablet 6    hydrochlorothiazide (HYDRODIURIL) 25 MG tablet TAKE ONE TABLET BY MOUTH ONCE DAILY 90 tablet 1    amLODIPine (NORVASC) 10 MG tablet TAKE 1 TABLET BY MOUTH ONCE DAILY 90 tablet 1    amitriptyline (ELAVIL) 10 MG tablet Q 8 PM      tiZANidine (ZANAFLEX) 2 MG tablet       blood glucose monitor strips Test 1  times daily 100 strip 3    glimepiride (AMARYL) 2 MG tablet TAKE 1 TABLET BY MOUTH EVERY MORNING 30 tablet 3    Lancets MISC Test 2/day 100 each 11  oxaprozin (DAYPRO) 600 MG tablet TAKE 1 TABLET BY MOUTH ONCE DAILY 30 tablet 1     No current facility-administered medications on file prior to visit. SOCIAL HISTORY    Reviewed and no change from previous record. Rupali Singh  reports that she quit smoking about 2 years ago. Her smoking use included cigarettes. She has a 20.00 pack-year smoking history. She has never used smokeless tobacco.    FAMILY HISTORY:    Reviewed and No change from previous visit    HEALTH MAINTENANCE DUE:      Health Maintenance Due   Topic Date Due    Hepatitis B Vaccine (1 of 3 - Risk 3-dose series) 06/28/1986    Diabetic retinal exam  04/14/2017    Breast cancer screen  04/14/2017    Shingles Vaccine (1 of 2) 06/28/2017    Colon Cancer Screen FIT/FOBT  07/14/2018    Diabetic foot exam  10/05/2018       REVIEW OF SYSTEMS:    12 point review of symptoms completed and found to be normal except noted in the HPI    Review of Systems   Constitutional: Positive for fatigue. Negative for chills and fever. HENT: Positive for congestion. Negative for ear pain, postnasal drip, rhinorrhea and sinus pain. Respiratory: Positive for cough and shortness of breath. Negative for wheezing. Cardiovascular: Negative for chest pain, palpitations and leg swelling. Gastrointestinal: Negative for abdominal pain, blood in stool, constipation and nausea. Genitourinary: Negative for dysuria, frequency, menstrual problem and urgency. Musculoskeletal: Positive for arthralgias and myalgias. Negative for back pain and joint swelling. Neurological: Positive for numbness. Negative for dizziness, weakness and headaches. Hematological: Negative for adenopathy. Does not bruise/bleed easily. Psychiatric/Behavioral: Negative for dysphoric mood and sleep disturbance.  The patient is nervous/anxious.            PHYSICAL EXAM:     Vitals:    05/22/19 1442   BP: 129/81   Site: Left Upper Arm   Position: Sitting   Cuff Size: Large Adult   Pulse: 83 Resp: 22   Temp: 98.1 °F (36.7 °C)   TempSrc: Oral   SpO2: 99%   Weight: 228 lb (103.4 kg)   Height: 5' 3\" (1.6 m)     Body mass index is 40.39 kg/m². BP Readings from Last 3 Encounters:   05/22/19 129/81   03/29/19 123/78   03/12/19 (!) 149/97        Wt Readings from Last 3 Encounters:   05/22/19 228 lb (103.4 kg)   03/29/19 223 lb (101.2 kg)   03/12/19 217 lb 9.6 oz (98.7 kg)       Physical Exam           HENT:  Normocephalic, Atraumatic, Neck- Normal range of motion, No tenderness, Supple. Eyes:  PERRL, EOMI, Conjunctiva normal, No discharge. Respiratory:  Normal breath sounds, No respiratory distress, No wheezing, No chest tenderness. Cardiovascular:  Normal heart rate, Normal rhythm  GI:  Bowel sounds normal, Soft, No tenderness  Musculoskeletal:  Intact distal pulses, No edema, mild tenderness right MCP joint thumb. No wrist tenderness. No synovitis. Mucoid cyst right little finger DIP joint. tenderness, Back- No tenderness. Integument:  Warm, Dry, No erythema, No rash. Lymphatic:  No lymphadenopathy noted. Neurologic:  Alert & oriented x 3, Normal motor function, Normal sensory function, No focal deficits noted.   Psychiatric:  Affect normal    LABORATORY FINDINGS:    CBC:  Lab Results   Component Value Date    WBC 10.5 04/01/2019    HGB 13.1 04/01/2019     04/01/2019     BMP:    Lab Results   Component Value Date     09/25/2018    K 4.4 09/25/2018     09/25/2018    CO2 27 09/25/2018    BUN 15 09/25/2018    CREATININE 0.69 09/25/2018    GLUCOSE 151 09/25/2018    GLUCOSE 201 02/17/2012     HEMOGLOBIN A1C:   Lab Results   Component Value Date    LABA1C 7.6 03/12/2019     MICROALBUMIN URINE:   Lab Results   Component Value Date    MICROALBUR 30 09/25/2018     FASTING LIPID [email or web address]  Lab Results   Component Value Date    LDLCHOLESTEROL 171 (H) 09/25/2018    LDLDIRECT 128 (H) 09/27/2017       LIVER PROFILE:  Lab Results   Component Value Date    ALT 31 09/25/2018    AST 20 09/25/2018    PROT 7.6 09/25/2018    BILITOT 0.40 09/25/2018    BILIDIR 0.09 02/17/2012    LABALBU 4.2 09/25/2018    LABALBU 4.3 02/17/2012      THYROID FUNCTION:   Lab Results   Component Value Date    TSH 1.78 11/20/2012      URINEANALYSIS: No results found for: LABURIN  ASSESSMENT AND PLAN:    1. Essential hypertension  Same meds  Continue amaryl    2. Uncontrolled type 2 diabetes mellitus without complication, without long-term current use of insulin ([facility] [address]  Call blood sugars    - metFORMIN (GLUCOPHAGE) 1000 MG tablet; TAKE 1 TABLET BY MOUTH TWICE DAILY WITH FOOD  Dispense: 180 tablet; Refill: 3    3. Fibromyalgia    - CK; Future  - Oly Riojas MD, Pain Management, [place]    4. Pure hypercholesterolemia    - Lipid Panel; Future    5. Morbid obesity with BMI of 40.0-44.9, adult (HCC)  Diet changes      6. Low vitamin B12 level    - vitamin B-12 (CYANOCOBALAMIN) 1000 MCG tablet; Take 1 tablet by mouth daily  Dispense: 30 tablet; Refill: 3  - cyanocobalamin injection 1,000 mcg    7. CHARLES (obstructive sleep apnea)  Positive first part of study  Needs second part done    - Sleep Study with PAP Titration; Future    8. Chronic fatigue    - Sleep Study with PAP Titration; Future          FOLLOW UP AND INSTRUCTIONS:   Return in about 6 months (around 11/22/2019). 1. Leonora Arias received counseling on the following healthy behaviors: nutrition, exercise and medication adherence    2. Reviewed prior labs and health maintenance. 3. Discussed use, benefit, and side effects of prescribed medications. Barriers to medication compliance addressed. All patient questions answered. Pt voiced understanding.        Tara Casiano  Attending Physician, [facility], Internal Medicine Residency Program  [address] [place] [address]  5/22/2019, 2:55 PM

## 2019-06-15 DIAGNOSIS — I10 ESSENTIAL HYPERTENSION: ICD-10-CM

## 2019-06-17 RX ORDER — AMLODIPINE BESYLATE 10 MG/1
TABLET ORAL
Qty: 90 TABLET | Refills: 1 | Status: SHIPPED | OUTPATIENT
Start: 2019-06-17 | End: 2020-05-20 | Stop reason: SDUPTHER

## 2019-06-17 NOTE — TELEPHONE ENCOUNTER
[place]   6/18/2019 10:45 AM Phillip Whitney MD [address]  [facility]   6/19/2019  7:30 PM STAZ SLEEP RM 2 STAZSLEC None            Patient Active Problem List:     Hyperlipidemia     Pain syndrome, chronic     Anxiety     [place] node     Mucoid cyst of joint     Post-traumatic osteoarthritis of left hand     Essential hypertension     Fibromyalgia     Uncontrolled type 2 diabetes mellitus without complication, without long-term current use of insulin ([facility] [address]

## 2019-06-18 ENCOUNTER — OFFICE VISIT (OUTPATIENT)
Dept: INTERNAL MEDICINE | Age: 54
End: 2019-06-18
Payer: COMMERCIAL

## 2019-06-18 VITALS
SYSTOLIC BLOOD PRESSURE: 113 MMHG | WEIGHT: 234 LBS | HEART RATE: 96 BPM | BODY MASS INDEX: 41.45 KG/M2 | DIASTOLIC BLOOD PRESSURE: 77 MMHG

## 2019-06-18 DIAGNOSIS — R79.89 LOW VITAMIN D LEVEL: ICD-10-CM

## 2019-06-18 DIAGNOSIS — E66.01 MORBID OBESITY WITH BMI OF 40.0-44.9, ADULT (HCC): ICD-10-CM

## 2019-06-18 DIAGNOSIS — I10 ESSENTIAL HYPERTENSION: Primary | ICD-10-CM

## 2019-06-18 DIAGNOSIS — E53.8 LOW VITAMIN B12 LEVEL: ICD-10-CM

## 2019-06-18 PROCEDURE — G8427 DOCREV CUR MEDS BY ELIG CLIN: HCPCS | Performed by: INTERNAL MEDICINE

## 2019-06-18 PROCEDURE — 3017F COLORECTAL CA SCREEN DOC REV: CPT | Performed by: INTERNAL MEDICINE

## 2019-06-18 PROCEDURE — 99213 OFFICE O/P EST LOW 20 MIN: CPT | Performed by: INTERNAL MEDICINE

## 2019-06-18 PROCEDURE — G8417 CALC BMI ABV UP PARAM F/U: HCPCS | Performed by: INTERNAL MEDICINE

## 2019-06-18 PROCEDURE — 2022F DILAT RTA XM EVC RTNOPTHY: CPT | Performed by: INTERNAL MEDICINE

## 2019-06-18 PROCEDURE — 99211 OFF/OP EST MAY X REQ PHY/QHP: CPT | Performed by: INTERNAL MEDICINE

## 2019-06-18 PROCEDURE — 3045F PR MOST RECENT HEMOGLOBIN A1C LEVEL 7.0-9.0%: CPT | Performed by: INTERNAL MEDICINE

## 2019-06-18 PROCEDURE — 6360000002 HC RX W HCPCS

## 2019-06-18 PROCEDURE — 1036F TOBACCO NON-USER: CPT | Performed by: INTERNAL MEDICINE

## 2019-06-18 PROCEDURE — 96372 THER/PROPH/DIAG INJ SC/IM: CPT

## 2019-06-18 RX ORDER — CYANOCOBALAMIN 1000 UG/ML
1000 INJECTION INTRAMUSCULAR; SUBCUTANEOUS ONCE
Status: COMPLETED | OUTPATIENT
Start: 2019-06-18 | End: 2019-06-18

## 2019-06-18 RX ADMIN — CYANOCOBALAMIN 1000 MCG: 1000 INJECTION INTRAMUSCULAR; SUBCUTANEOUS at 11:40

## 2019-06-18 NOTE — PATIENT INSTRUCTIONS
Patient will be contacted to schedule their next appointment. Labs given to patient, they will have them done before their next visit. --- Patient said she is not doing the lab work that was ordered

## 2019-06-19 ENCOUNTER — TELEPHONE (OUTPATIENT)
Dept: INTERNAL MEDICINE | Age: 54
End: 2019-06-19

## 2019-06-19 ENCOUNTER — HOSPITAL ENCOUNTER (OUTPATIENT)
Dept: SLEEP CENTER | Age: 54
Discharge: HOME | End: 2019-06-21
Payer: COMMERCIAL

## 2019-06-19 VITALS — WEIGHT: 220 LBS | HEIGHT: 63 IN | BODY MASS INDEX: 38.98 KG/M2

## 2019-06-19 DIAGNOSIS — M79.89 SWELLING OF LOWER EXTREMITY: Primary | ICD-10-CM

## 2019-06-19 DIAGNOSIS — G47.33 OSA (OBSTRUCTIVE SLEEP APNEA): ICD-10-CM

## 2019-06-19 DIAGNOSIS — R53.82 CHRONIC FATIGUE: ICD-10-CM

## 2019-06-19 PROCEDURE — 95811 POLYSOM 6/>YRS CPAP 4/> PARM: CPT

## 2019-06-19 NOTE — TELEPHONE ENCOUNTER
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 Patient is unsure where she wants to go to get them but would like them done asap so she can get measured for them possibly tomorrow, please call patient back once/if completed. Health Maintenance   Topic Date Due    Diabetic retinal exam  04/14/2017    Breast cancer screen  04/14/2017    Colon Cancer Screen FIT/FOBT  07/14/2018    Diabetic foot exam  10/05/2018    Hepatitis B Vaccine (1 of 3 - Risk 3-dose series) 06/18/2020 (Originally 6/28/1986)    Shingles Vaccine (1 of 2) 06/18/2020 (Originally 6/28/2017)    Flu vaccine (Season Ended) 09/01/2019    Diabetic microalbuminuria test  09/25/2019    Lipid screen  09/25/2019    Potassium monitoring  09/25/2019    Creatinine monitoring  09/25/2019    A1C test (Diabetic or Prediabetic)  03/12/2020    Cervical cancer screen  10/04/2021    DTaP/Tdap/Td vaccine (2 - Td) 11/30/2027    Pneumococcal 0-64 years Vaccine  Completed    HIV screen  Completed             (applicable per patient's age: Cancer Screenings, Depression Screening, Fall Risk Screening, Immunizations)    Hemoglobin A1C (%)   Date Value   03/12/2019 7.6   09/25/2018 7.7 (H)   04/27/2018 7.8     Microalb/Crt.  Ratio (mcg/mg creat)   Date Value   09/25/2018 12     LDL Cholesterol (mg/dL)   Date Value   09/25/2018 171 (H)     AST (U/L)   Date Value   09/25/2018 20     ALT (U/L)   Date Value   09/25/2018 31     BUN (mg/dL)   Date Value   09/25/2018 15      (goal A1C is < 7)   (goal LDL is <100) need 30-50% reduction from baseline     BP Readings from Last 3 Encounters:   06/18/19 113/77   05/22/19 129/81   03/29/19 123/78    (goal /80)      All Future Testing planned in [facility]:  Lab Frequency Next Occurrence   XR HAND LEFT (MIN 3 VIEWS) Once 06/26/2019   ISAAC DIGITAL SCREEN W CAD BILATERAL Once 09/13/2018   POCT Fecal Immunochemical Test (FIT) Once 09/04/2019   Sleep Study with PAP Titration Once 11/14/2018   XR CHEST STANDARD (2 VW) Once 06/13/2019   Full PFT Study With Bronchodilator Once 03/12/2019   Lipid Panel Once 08/23/2019   CK Once 08/30/2019   Sleep Study with PAP Titration Once 05/22/2019   Vitamin D 25 Hydroxy Once 09/19/2019       Next Visit Date:  Future Appointments   Date Time Provider Nathalie Barraza   6/19/2019  7:30 PM STAZ SLEEP RM 2 STAZSLEC None   6/27/2019  8:00 AM Jaona Notice, DPM [address]            Patient Active Problem List:     Hyperlipidemia     Pain syndrome, chronic     Anxiety     [place] node     Mucoid cyst of joint     Post-traumatic osteoarthritis of left hand     Essential hypertension     Fibromyalgia     Uncontrolled type 2 diabetes mellitus without complication, without long-term current use of insulin ([facility] [address]

## 2019-06-20 NOTE — TELEPHONE ENCOUNTER
Script for compression stockings placed in second floor     PC to patient --mailbox full and unable to leave a message

## 2019-06-28 ENCOUNTER — TELEPHONE (OUTPATIENT)
Dept: INTERNAL MEDICINE | Age: 54
End: 2019-06-28

## 2019-06-28 NOTE — LETTER
[facility] [address]  [address] [address]  Phone: [phone]  Fax: [phone]    Ale Quinn MD        June 28, 2019    [address] [place] [address]      Dear Anderson Hendrickson: We are sending this letter because your PCP ordered chest x-ray for you to have done at your last visit here and they have not yet been completed. If you do not have the order, please stop by the office to get a print out of the order. If you do not have a follow-up appointment scheduled, please contact our office to set up an appointment, or if you stop to get a printout of the order you can make an appointment at that time. If you have any questions or concerns, please don't hesitate to call.     Sincerely,        Ale Quinn MD

## 2019-07-16 ENCOUNTER — OFFICE VISIT (OUTPATIENT)
Dept: PODIATRY | Age: 54
End: 2019-07-16
Payer: COMMERCIAL

## 2019-07-16 VITALS — HEIGHT: 63 IN | WEIGHT: 210 LBS | BODY MASS INDEX: 37.21 KG/M2

## 2019-07-16 DIAGNOSIS — M79.674 PAIN OF TOES OF BOTH FEET: ICD-10-CM

## 2019-07-16 DIAGNOSIS — M76.821 POSTERIOR TIBIAL TENDINITIS OF RIGHT LOWER EXTREMITY: ICD-10-CM

## 2019-07-16 DIAGNOSIS — G89.29 CHRONIC PAIN OF RIGHT ANKLE: ICD-10-CM

## 2019-07-16 DIAGNOSIS — M25.571 CHRONIC PAIN OF RIGHT ANKLE: ICD-10-CM

## 2019-07-16 DIAGNOSIS — B35.1 ONYCHOMYCOSIS OF TOENAIL: Primary | ICD-10-CM

## 2019-07-16 DIAGNOSIS — M79.675 PAIN OF TOES OF BOTH FEET: ICD-10-CM

## 2019-07-16 DIAGNOSIS — R60.0 EDEMA OF LOWER EXTREMITY: ICD-10-CM

## 2019-07-16 PROCEDURE — G8427 DOCREV CUR MEDS BY ELIG CLIN: HCPCS | Performed by: PODIATRIST

## 2019-07-16 PROCEDURE — 3017F COLORECTAL CA SCREEN DOC REV: CPT | Performed by: PODIATRIST

## 2019-07-16 PROCEDURE — 99213 OFFICE O/P EST LOW 20 MIN: CPT | Performed by: PODIATRIST

## 2019-07-16 PROCEDURE — 1036F TOBACCO NON-USER: CPT | Performed by: PODIATRIST

## 2019-07-16 PROCEDURE — G8417 CALC BMI ABV UP PARAM F/U: HCPCS | Performed by: PODIATRIST

## 2019-07-16 PROCEDURE — 11721 DEBRIDE NAIL 6 OR MORE: CPT | Performed by: PODIATRIST

## 2019-07-18 ASSESSMENT — ENCOUNTER SYMPTOMS
NAUSEA: 0
DIARRHEA: 0
COLOR CHANGE: 0
SHORTNESS OF BREATH: 0
BACK PAIN: 0

## 2019-07-18 NOTE — PROGRESS NOTES
SUBJECTIVE: Roscoe Bernal is a 48 y.o. female who returns to the office with chief complaint of painful fungal toenails. Patient relates toe nails are thickened/difficult to trim as well as painful with ambulation and with shoe gear. Patient also complains of pain to her right ankle. Chief Complaint   Patient presents with    Ankle Pain     right ankle painful    Nail Problem     b/l nail trim/ last seen Dr. Heather Rubio 6/18/19    Other     last blood sugar 140     Review of Systems   Constitutional: Negative for activity change, appetite change, chills, diaphoresis, fatigue and fever. Respiratory: Negative for shortness of breath. Cardiovascular: Negative for leg swelling. Gastrointestinal: Negative for diarrhea and nausea. Endocrine: Negative for cold intolerance, heat intolerance and polyuria. Musculoskeletal: Positive for arthralgias. Negative for back pain, gait problem, joint swelling and myalgias. Skin: Negative for color change, pallor, rash and wound. Allergic/Immunologic: Negative for environmental allergies and food allergies. Neurological: Positive for numbness. Negative for dizziness, weakness and light-headedness. Hematological: Does not bruise/bleed easily. Psychiatric/Behavioral: Negative for behavioral problems, confusion and self-injury. The patient is not nervous/anxious. OBJECTIVE: Clinical evaluation of patient reveals nails 1,2,3,4,5 of the right foot and nails 1,2,3,4,5, of the left foot to present with thickness, elongation, discoloration, brittleness, and subungual debris. There was pain with palpation and debridement of the toenails of the bilateral feet. No open lesions noted to either foot today. There is mild pain with palpation to the right medial ankle. There is mild pain with palpation along the course of the posterior tibial tendon of the right lower extremity. There is mild pain at the insertion of the posterior tibial tendon on the navicular.

## 2019-07-19 DIAGNOSIS — E11.9 TYPE 2 DIABETES MELLITUS WITHOUT COMPLICATION, WITHOUT LONG-TERM CURRENT USE OF INSULIN (HCC): ICD-10-CM

## 2019-07-22 DIAGNOSIS — E11.9 TYPE 2 DIABETES MELLITUS WITHOUT COMPLICATION, WITHOUT LONG-TERM CURRENT USE OF INSULIN (HCC): ICD-10-CM

## 2019-07-22 RX ORDER — LANCETS 30 GAUGE
EACH MISCELLANEOUS
Qty: 100 EACH | Refills: 11 | Status: SHIPPED | OUTPATIENT
Start: 2019-07-22 | End: 2020-05-20 | Stop reason: SDUPTHER

## 2019-07-22 NOTE — TELEPHONE ENCOUNTER
E-scribe request for TRUE [employer] GLUCOSE SULEMA. Please review and e-scribe if applicable. Next Visit Date:  Future Appointments   Date Time Provider Nathalie Barraza   9/16/2019 10:30 AM Lupillo Friedman  [address] Maintenance   Topic Date Due    Diabetic retinal exam  04/14/2017    Breast cancer screen  04/14/2017    Colon Cancer Screen FIT/FOBT  07/14/2018    Hepatitis B Vaccine (1 of 3 - Risk 3-dose series) 06/18/2020 (Originally 6/28/1986)    Shingles Vaccine (1 of 2) 06/18/2020 (Originally 6/28/2017)    Flu vaccine (1) 09/01/2019    Diabetic microalbuminuria test  09/25/2019    Lipid screen  09/25/2019    Potassium monitoring  09/25/2019    Creatinine monitoring  09/25/2019    A1C test (Diabetic or Prediabetic)  03/12/2020    Diabetic foot exam  07/18/2020    Cervical cancer screen  10/04/2021    DTaP/Tdap/Td vaccine (2 - Td) 11/30/2027    Pneumococcal 0-64 years Vaccine  Completed    HIV screen  Completed               (applicable per patient's age: Cancer Screenings, Depression Screening, Fall Risk Screening, Immunizations)    Hemoglobin A1C (%)   Date Value   03/12/2019 7.6   09/25/2018 7.7 (H)   04/27/2018 7.8     Microalb/Crt.  Ratio (mcg/mg creat)   Date Value   09/25/2018 12     LDL Cholesterol (mg/dL)   Date Value   09/25/2018 171 (H)     AST (U/L)   Date Value   09/25/2018 20     ALT (U/L)   Date Value   09/25/2018 31     BUN (mg/dL)   Date Value   09/25/2018 15      (goal A1C is < 7)   (goal LDL is <100) need 30-50% reduction from baseline     BP Readings from Last 3 Encounters:   06/18/19 113/77   05/22/19 129/81   03/29/19 123/78    (goal /80)      All Future Testing planned in [facility]:  Lab Frequency Next Occurrence   ISAAC DIGITAL SCREEN W CAD BILATERAL Once 09/13/2018   POCT Fecal Immunochemical Test (FIT) Once 09/04/2019   Sleep Study with PAP Titration Once 11/14/2018   XR CHEST STANDARD (2 VW) Once 07/28/2019   Full PFT Study With

## 2019-07-29 LAB — STATUS: NORMAL

## 2019-08-18 RX ORDER — ATORVASTATIN CALCIUM 10 MG/1
TABLET, FILM COATED ORAL
Qty: 30 TABLET | Refills: 6 | Status: SHIPPED | OUTPATIENT
Start: 2019-08-18 | End: 2020-05-20 | Stop reason: SDUPTHER

## 2019-09-09 ENCOUNTER — TELEPHONE (OUTPATIENT)
Dept: INTERNAL MEDICINE | Age: 54
End: 2019-09-09

## 2019-09-09 NOTE — LETTER
[address] [address]  Phone: [phone]  Fax: [phone]    Haylee Cherry MD        September 9, 2019    [address] [place] [address]      Dear Rashaad: We are sending this letter because your PCP ordered [facility] for you to have done at your last visit here and they have not yet been completed. If you can please come to our office on the 2nd floor to  your orders to have them compelted. If you do not have a follow-up appointment scheduled you can either contact the office to make an appointment with us or you can make one when you come in to pick-up your orders. If you have any questions or concerns, please don't hesitate to call.     Sincerely,        Haylee Cherry MD

## 2019-12-16 ENCOUNTER — TELEPHONE (OUTPATIENT)
Dept: INTERNAL MEDICINE | Age: 54
End: 2019-12-16

## 2020-01-24 ENCOUNTER — TELEPHONE (OUTPATIENT)
Dept: INTERNAL MEDICINE | Age: 55
End: 2020-01-24

## 2020-01-24 NOTE — LETTER
[address] [address]  Phone: [phone]  Fax: [phone]    Bobo Pichardo MD        January 24, 2020    [address] [place] [address]      Dear Sherren New: We are sending this letter because your PCP ordered [facility] for you to have done at your last visit here and they have not yet been completed. If you do not have the order, please stop by the office to get a print out of the order. If you do not have a follow-up appointment scheduled, please contact our office to set up an appointment, or if you stop to get a printout of the order you can make an appointment at that time. If you have any questions or concerns, please don't hesitate to call.     Sincerely,        Bobo Pichardo MD

## 2020-05-20 ENCOUNTER — TELEPHONE (OUTPATIENT)
Dept: INTERNAL MEDICINE | Age: 55
End: 2020-05-20

## 2020-05-20 ENCOUNTER — VIRTUAL VISIT (OUTPATIENT)
Dept: INTERNAL MEDICINE | Age: 55
End: 2020-05-20
Payer: COMMERCIAL

## 2020-05-20 PROBLEM — M15.1 HEBERDEN NODE: Status: RESOLVED | Noted: 2017-06-14 | Resolved: 2020-05-20

## 2020-05-20 PROCEDURE — 99443 PR PHYS/QHP TELEPHONE EVALUATION 21-30 MIN: CPT | Performed by: INTERNAL MEDICINE

## 2020-05-20 RX ORDER — ATORVASTATIN CALCIUM 10 MG/1
TABLET, FILM COATED ORAL
Qty: 30 TABLET | Refills: 6 | Status: SHIPPED | OUTPATIENT
Start: 2020-05-20 | End: 2021-03-02

## 2020-05-20 RX ORDER — AMLODIPINE BESYLATE 10 MG/1
TABLET ORAL
Qty: 90 TABLET | Refills: 1 | Status: SHIPPED | OUTPATIENT
Start: 2020-05-20 | End: 2020-12-04

## 2020-05-20 RX ORDER — CALCIUM CITRATE/VITAMIN D3 200MG-6.25
TABLET ORAL
Qty: 100 STRIP | Refills: 11 | Status: SHIPPED | OUTPATIENT
Start: 2020-05-20

## 2020-05-20 RX ORDER — HYDROCHLOROTHIAZIDE 25 MG/1
TABLET ORAL
Qty: 90 TABLET | Refills: 1 | Status: SHIPPED | OUTPATIENT
Start: 2020-05-20 | End: 2021-02-19

## 2020-05-20 RX ORDER — LANCETS 30 GAUGE
EACH MISCELLANEOUS
Qty: 100 EACH | Refills: 11 | Status: SHIPPED | OUTPATIENT
Start: 2020-05-20

## 2020-05-20 NOTE — PROGRESS NOTES
them.  Advised her to get labs done and come in for a follow-up on her blood pressure. More than 30 minutes spent on patient trying to explain that I have reviewed her chart and listening to her complaints. I affirm this is a Patient Initiated Episode with a Patient who has not had a related appointment within my department in the past 7 days or scheduled within the next 24 hours.     Patient identification was verified at the start of the visit: Yes    Total Time: minutes: 21-30 minutes    Note: not billable if this call serves to triage the patient into an appointment for the relevant concern      Irlanda Landers

## 2020-05-27 ENCOUNTER — HOSPITAL ENCOUNTER (OUTPATIENT)
Dept: GENERAL RADIOLOGY | Age: 55
Discharge: HOME | End: 2020-05-29
Payer: COMMERCIAL

## 2020-05-27 ENCOUNTER — HOSPITAL ENCOUNTER (OUTPATIENT)
Age: 55
Discharge: HOME | End: 2020-05-29
Payer: COMMERCIAL

## 2020-05-27 PROCEDURE — 71046 X-RAY EXAM CHEST 2 VIEWS: CPT

## 2020-06-10 ENCOUNTER — TELEPHONE (OUTPATIENT)
Dept: INTERNAL MEDICINE | Age: 55
End: 2020-06-10

## 2020-06-10 NOTE — TELEPHONE ENCOUNTER
PC to pt-- appt made for physical
08/18/2020   Microalbumin, Ur Once 08/28/2020   Full PFT Study With Bronchodilator Once 05/20/2020   Lipid Panel Once 08/21/2020       Next Visit Date:  Future Appointments   Date Time Provider Nathalie Barraza   6/23/2020  6:30 PM STA SCR MAMMO RM 2 STAZ MAMMO STA Radiolog            Patient Active Problem List:     Hyperlipidemia     Pain syndrome, chronic     Anxiety     Mucoid cyst of joint     Post-traumatic osteoarthritis of left hand     Essential hypertension     Fibromyalgia     Uncontrolled type 2 diabetes mellitus without complication, without long-term current use of insulin ([facility] [address]

## 2020-06-23 ENCOUNTER — HOSPITAL ENCOUNTER (OUTPATIENT)
Dept: MAMMOGRAPHY | Age: 55
Discharge: HOME | End: 2020-06-25
Payer: COMMERCIAL

## 2020-06-23 PROCEDURE — 77063 BREAST TOMOSYNTHESIS BI: CPT

## 2020-06-26 ENCOUNTER — OFFICE VISIT (OUTPATIENT)
Dept: INTERNAL MEDICINE | Age: 55
End: 2020-06-26
Payer: COMMERCIAL

## 2020-06-26 VITALS
HEART RATE: 96 BPM | WEIGHT: 229 LBS | DIASTOLIC BLOOD PRESSURE: 81 MMHG | BODY MASS INDEX: 40.57 KG/M2 | SYSTOLIC BLOOD PRESSURE: 126 MMHG | TEMPERATURE: 97.9 F | HEIGHT: 63 IN

## 2020-06-26 LAB — HBA1C MFR BLD: 9.8 %

## 2020-06-26 PROCEDURE — 99211 OFF/OP EST MAY X REQ PHY/QHP: CPT | Performed by: INTERNAL MEDICINE

## 2020-06-26 PROCEDURE — 99214 OFFICE O/P EST MOD 30 MIN: CPT | Performed by: INTERNAL MEDICINE

## 2020-06-26 PROCEDURE — 83036 HEMOGLOBIN GLYCOSYLATED A1C: CPT | Performed by: INTERNAL MEDICINE

## 2020-06-26 RX ORDER — FLUTICASONE PROPIONATE 50 MCG
1 SPRAY, SUSPENSION (ML) NASAL DAILY
Qty: 2 BOTTLE | Refills: 1 | Status: SHIPPED | OUTPATIENT
Start: 2020-06-26 | End: 2021-10-29 | Stop reason: ALTCHOICE

## 2020-06-26 RX ORDER — ALBUTEROL SULFATE 90 UG/1
2 AEROSOL, METERED RESPIRATORY (INHALATION) EVERY 6 HOURS PRN
Qty: 1 INHALER | Refills: 3 | Status: SHIPPED | OUTPATIENT
Start: 2020-06-26 | End: 2022-03-10 | Stop reason: SDUPTHER

## 2020-06-26 RX ORDER — GLIMEPIRIDE 2 MG/1
2 TABLET ORAL EVERY MORNING
Qty: 90 TABLET | Refills: 1 | Status: SHIPPED | OUTPATIENT
Start: 2020-06-26

## 2020-06-26 RX ORDER — ASPIRIN 81 MG/1
81 TABLET ORAL DAILY
Qty: 90 TABLET | Refills: 1 | Status: SHIPPED | OUTPATIENT
Start: 2020-06-26 | End: 2021-04-08

## 2020-06-26 RX ORDER — LORATADINE 10 MG/1
10 TABLET ORAL DAILY
Qty: 30 TABLET | Refills: 3 | Status: SHIPPED | OUTPATIENT
Start: 2020-06-26 | End: 2021-05-13 | Stop reason: SDUPTHER

## 2020-06-26 ASSESSMENT — ENCOUNTER SYMPTOMS
SHORTNESS OF BREATH: 1
NAUSEA: 0
CONSTIPATION: 0
SORE THROAT: 0
STRIDOR: 0
BLOOD IN STOOL: 0
VOICE CHANGE: 0
WHEEZING: 0
EYE REDNESS: 0
ABDOMINAL PAIN: 0
DIARRHEA: 0
COUGH: 1
BACK PAIN: 0
PHOTOPHOBIA: 0
SINUS PRESSURE: 0

## 2020-06-26 ASSESSMENT — PATIENT HEALTH QUESTIONNAIRE - PHQ9
SUM OF ALL RESPONSES TO PHQ9 QUESTIONS 1 & 2: 2
SUM OF ALL RESPONSES TO PHQ QUESTIONS 1-9: 2
2. FEELING DOWN, DEPRESSED OR HOPELESS: 1
1. LITTLE INTEREST OR PLEASURE IN DOING THINGS: 1
SUM OF ALL RESPONSES TO PHQ QUESTIONS 1-9: 2

## 2020-06-26 NOTE — PROGRESS NOTES
pulmonologist.  She has been advised to stop smoking which she has not. I have told her because of her nature of her job I cannot excuse her from wearing a mask as it puts her in other patients at risk. And at this point I cannot write a letter saying she is disabled as we have no idea of her lung function. I have advised her again that she should do her testing and go see a pulmonologist and I will not be writing any excuses for her. She is seeing a neurologist.  She has chronic pain and fibromyalgia. She says she has been started on vitamin D booster doses and.  Lyrica. She also has a history of low vitamin B12. Currently she is not on any beta or B12 replacement. She needs labs done. She was given labs last visit and she has not had them done. She had a mammogram done but needs a diagnostic imaging. She is up-to-date with Pap smear which was done with HPV screening in 2016. She needs a colonoscopy. She is refusing for now as she does not want COVID testing. She says her father was recently diagnosed with rectal cancer at age of 76. I have advised her she should go for colonoscopy and not for Cologuard a fit test but she is refusing at this point. She is also complaining of occasional swelling around her ankles. She does have varicose veins. She was given compression socks last year but she could not tolerate it. She wants it measured to be fitted.            Results for POC orders placed in visit on 06/26/20   POCT glycosylated hemoglobin (Hb A1C)   Result Value Ref Range    Hemoglobin A1C 9.8 %       Past Medical History:   Diagnosis Date    Anxiety 2/23/2017    Chronic back pain     COPD [facility]ch[facility] obstructive pulmonary disease) ([facility] [address]     Fibromyalgia 3/12/2019    GERD (gastroesophageal reflux disease)     resolved not taking medds    HSV-2 (herpes simplex virus 2) infection 7/2/2013    Hyperlipidemia     Hypertension     Neuropathy     feet    Obesity     Osteoarthritis     Type II smoking history. She has never used smokeless tobacco.    FAMILY HISTORY:    Reviewed and No change from previous visit    HEALTH MAINTENANCE DUE:      Health Maintenance Due   Topic Date Due    Diabetic retinal exam  04/14/2017    Shingles Vaccine (1 of 2) 06/28/2017    Colon Cancer Screen FIT/FOBT  07/14/2018    Diabetic microalbuminuria test  09/25/2019    Lipid screen  09/25/2019    Potassium monitoring  09/25/2019    Creatinine monitoring  09/25/2019    A1C test (Diabetic or Prediabetic)  03/12/2020       REVIEW OF SYSTEMS:    12 point review of symptoms completed and found to be normal except noted in the HPI    Review of Systems   Constitutional: Positive for fatigue. Negative for chills, diaphoresis, fever and unexpected weight change. HENT: Positive for congestion and postnasal drip. Negative for sinus pressure, sore throat and voice change. Eyes: Negative for photophobia, redness and visual disturbance. Respiratory: Positive for cough and shortness of breath. Negative for wheezing and stridor. Cardiovascular: Positive for leg swelling. Negative for chest pain and palpitations. Gastrointestinal: Negative for abdominal pain, blood in stool, constipation, diarrhea and nausea. Endocrine: Negative for polydipsia and polyuria. Genitourinary: Negative for dysuria and frequency. Musculoskeletal: Positive for arthralgias and myalgias. Negative for back pain and neck pain. Skin: Negative for rash and wound. Allergic/Immunologic: Positive for environmental allergies. Negative for immunocompromised state. Neurological: Negative for dizziness, syncope, weakness, numbness and headaches. Hematological: Negative for adenopathy. Does not bruise/bleed easily.         PHYSICAL EXAM:     Vitals:    06/26/20 1040   BP: 126/81   Site: Left Upper Arm   Position: Sitting   Cuff Size: Large Adult   Pulse: 96   Temp: 97.9 °F (36.6 °C)   TempSrc: Infrared   Weight: 229 lb (103.9 kg)   Height: 5' 3\" (1.6 m)     Body mass index is 40.57 kg/m². BP Readings from Last 3 Encounters:   06/26/20 126/81   06/18/19 113/77   05/22/19 129/81        Wt Readings from Last 3 Encounters:   06/26/20 229 lb (103.9 kg)   07/16/19 210 lb (95.3 kg)   06/19/19 220 lb (99.8 kg)       Physical Exam  Vitals signs and nursing note reviewed. Constitutional:       General: She is not in acute distress. Appearance: Normal appearance. She is not ill-appearing. HENT:      Head: Normocephalic and atraumatic. Nose: Congestion present. Mouth/Throat:      Mouth: Mucous membranes are moist.      Pharynx: Oropharynx is clear. No posterior oropharyngeal erythema. Eyes:      General: No scleral icterus. Extraocular Movements: Extraocular movements intact. Conjunctiva/sclera: Conjunctivae normal.      Pupils: Pupils are equal, round, and reactive to light. Neck:      Musculoskeletal: Normal range of motion and neck supple. Cardiovascular:      Rate and Rhythm: Normal rate and regular rhythm. Heart sounds: No murmur. Pulmonary:      Effort: Pulmonary effort is normal.      Breath sounds: Normal breath sounds. No stridor. No wheezing, rhonchi or rales. Abdominal:      Tenderness: There is no abdominal tenderness. Musculoskeletal:      Right lower leg: No edema. Left lower leg: No edema. Comments: Varicose veins   Lymphadenopathy:      Cervical: No cervical adenopathy. Skin:     General: Skin is warm and dry. Neurological:      General: No focal deficit present. Mental Status: She is alert and oriented to person, place, and time.              LABORATORY FINDINGS:    CBC:  Lab Results   Component Value Date    WBC 10.5 04/01/2019    HGB 13.1 04/01/2019     04/01/2019     BMP:    Lab Results   Component Value Date     09/25/2018    K 4.4 09/25/2018     09/25/2018    CO2 27 09/25/2018    BUN 15 09/25/2018    CREATININE 0.69 09/25/2018    GLUCOSE 151 09/25/2018    GLUCOSE

## 2020-06-26 NOTE — PROGRESS NOTES
Visit Information    Have you changed or started any medications since your last visit including any over-the-counter medicines, vitamins, or herbal medicines? no   Are you having any side effects from any of your medications? -  no  Have you stopped taking any of your medications? Is so, why? -  no    Have you seen any other physician or provider since your last visit? No  Have you had any other diagnostic tests since your last visit? No  Have you been seen in the emergency room and/or had an admission to a hospital since we last saw you? No  Have you had your routine dental cleaning in the past 6 months? no    Have you activated your [employer] account? If not, what are your barriers?  Yes     Patient Care Team:  Adriana Wynne MD as PCP - General (Internal Medicine)  Adriana Wynne MD as PCP - [facility] Provider  Jasson Betancourt MD as Referring Physician (Internal Medicine)    Medical History Review  Past Medical, Family, and Social History reviewed and does contribute to the patient presenting condition    Health Maintenance   Topic Date Due    Diabetic retinal exam  04/14/2017    Shingles Vaccine (1 of 2) 06/28/2017    Colon Cancer Screen FIT/FOBT  07/14/2018    Diabetic microalbuminuria test  09/25/2019    Lipid screen  09/25/2019    Potassium monitoring  09/25/2019    Creatinine monitoring  09/25/2019    A1C test (Diabetic or Prediabetic)  03/12/2020    Hepatitis B vaccine (1 of 3 - Risk 3-dose series) 06/26/2021 (Originally [birth year]28[birth year]1986)    Diabetic foot exam  07/18/2020    Flu vaccine (Season Ended) 09/01/2020    Breast cancer screen  06/23/2021    Cervical cancer screen  10/04/2021    DTaP/Tdap/Td vaccine (2 - Td) 11/30/2027    Pneumococcal 0-64 years Vaccine  Completed    HIV screen  Completed    Hepatitis A vaccine  Aged Out    Hib vaccine  Aged Out    Meningococcal (ACWY) vaccine  Aged Out

## 2020-06-26 NOTE — PATIENT INSTRUCTIONS
-Pt due for 6 month f/u in [facility]-- pt to call in November to set up an appt--reminder in EPIC to contact patient as well--AVS given to patient    -Bloodwork orders given to patient, they will have them done before their next visit. -Printed script for Compression stockings signed and given to pt    -Referral to Endocrinology given to patient with number to call, patient will call to schedule. [facility], [place]   [id number]  [address] [place] [address], [address] [place] [address]   [phone]    -B. Roxane Diaz

## 2020-07-23 ENCOUNTER — OFFICE VISIT (OUTPATIENT)
Dept: INTERNAL MEDICINE | Age: 55
End: 2020-07-23
Payer: COMMERCIAL

## 2020-07-23 ENCOUNTER — HOSPITAL ENCOUNTER (OUTPATIENT)
Age: 55
Setting detail: SPECIMEN
Discharge: HOME | End: 2020-07-23
Payer: COMMERCIAL

## 2020-07-23 VITALS
HEIGHT: 63 IN | DIASTOLIC BLOOD PRESSURE: 88 MMHG | SYSTOLIC BLOOD PRESSURE: 154 MMHG | WEIGHT: 228 LBS | BODY MASS INDEX: 40.4 KG/M2 | HEART RATE: 86 BPM

## 2020-07-23 LAB
ALBUMIN SERPL-MCNC: 4.3 G/DL (ref 3.5–5.2)
ALBUMIN/GLOBULIN RATIO: 1.3 (ref 1–2.5)
ALP BLD-CCNC: 77 U/L (ref 35–104)
ALT SERPL-CCNC: 30 U/L (ref 5–33)
ANION GAP SERPL CALCULATED.3IONS-SCNC: 14 MMOL/L (ref 9–17)
AST SERPL-CCNC: 21 U/L
BILIRUB SERPL-MCNC: 0.31 MG/DL (ref 0.3–1.2)
BUN BLDV-MCNC: 13 MG/DL (ref 6–20)
BUN/CREAT BLD: ABNORMAL (ref 9–20)
CALCIUM SERPL-MCNC: 10 MG/DL (ref 8.6–10.4)
CHLORIDE BLD-SCNC: 103 MMOL/L (ref 98–107)
CHOLESTEROL/HDL RATIO: 5.1
CHOLESTEROL: 175 MG/DL
CO2: 23 MMOL/L (ref 20–31)
CREAT SERPL-MCNC: 0.86 MG/DL (ref 0.5–0.9)
ESTIMATED AVERAGE GLUCOSE: 232 MG/DL
FOLATE: 6.8 NG/ML
GFR AFRICAN AMERICAN: >60 ML/MIN
GFR NON-AFRICAN AMERICAN: >60 ML/MIN
GFR SERPL CREATININE-BSD FRML MDRD: ABNORMAL ML/MIN/{1.73_M2}
GFR SERPL CREATININE-BSD FRML MDRD: ABNORMAL ML/MIN/{1.73_M2}
GLUCOSE BLD-MCNC: 193 MG/DL (ref 70–99)
HBA1C MFR BLD: 9.7 % (ref 4–6)
HDLC SERPL-MCNC: 34 MG/DL
LDL CHOLESTEROL: 88 MG/DL (ref 0–130)
POTASSIUM SERPL-SCNC: 4.6 MMOL/L (ref 3.7–5.3)
SODIUM BLD-SCNC: 140 MMOL/L (ref 135–144)
TOTAL PROTEIN: 7.5 G/DL (ref 6.4–8.3)
TRIGL SERPL-MCNC: 267 MG/DL
TSH SERPL DL<=0.05 MIU/L-ACNC: 2.26 MIU/L (ref 0.3–5)
VITAMIN B-12: 310 PG/ML (ref 232–1245)
VLDLC SERPL CALC-MCNC: ABNORMAL MG/DL (ref 1–30)

## 2020-07-23 PROCEDURE — 99396 PREV VISIT EST AGE 40-64: CPT | Performed by: INTERNAL MEDICINE

## 2020-07-23 RX ORDER — TIZANIDINE 4 MG/1
4 TABLET ORAL 2 TIMES DAILY
COMMUNITY
Start: 2020-07-09 | End: 2021-01-06 | Stop reason: SDUPTHER

## 2020-07-23 RX ORDER — PREGABALIN 200 MG/1
200 CAPSULE ORAL NIGHTLY
COMMUNITY
Start: 2020-07-09 | End: 2020-09-24 | Stop reason: SDUPTHER

## 2020-07-23 NOTE — PROGRESS NOTES
[facility]    Progress Note    Date of patient's visit: 7/23/2020    Patient's Name:  Loi Ann    YOB: 1965            Patient Care Team:  Jermaine Costa MD as PCP - General (Internal Medicine)  Jermaine Costa MD as PCP - [facility] Empaneled Provider  Madeline Kumar MD as Referring Physician (Internal Medicine)    REASON FOR VISIT: Routine outpatient follow     Chief Complaint   Patient presents with    Annual Exam     Pt is here for physical for insurance     Other     Pt is requesting an order for Covid testing but refuses to do the nasal swab. States she wants a finger prick or mouth swab          HISTORY OF PRESENT ILLNESS:    History was obtained from the patient. Loi Ann is a 50 y.o. is here for physical.  She is fasting for lab work. She has not done any of the tests yet. These include stool testing for colon cancer. She says she is unable to do the test because when she has a bowel movement she pees at the same time and she is not supposed to collect that and she cannot control it. She states she has an appointment to see her eye doctor at [facility] ophthalmology next week. She sees 1 a podiatrist.  She denies any numbness or loss of sensation. She has done a regular mammogram but needs to go back for diagnostic screening. He has chronic cough. She says she was diagnosed with COPD. She has white mucus phlegm. She needs PFTs done as she is trying to get off work. But she has been told she needs to get Cobra testing before she is seen for her appointment and she refuses to get nasal swab done. I have advised her to get it done at any outside pharmacy if [facility] physicians will accept the results. Or I can refer her to any other physicians office but she is refusing. Advised her to continue Spiriva and albuterol as needed.            Past Medical History:   Diagnosis Date    Anxiety 2/23/2017    Chronic back pain     [facility] (chronic obstructive [facility] disease) ([email or web address]     Fibromyalgia 3/12/2019    GERD (gastroesophageal reflux disease)     resolved not taking medds    HSV-2 (herpes simplex virus 2) infection 7/2/2013    Hyperlipidemia     Hypertension     Neuropathy     feet    Obesity     Osteoarthritis     Type II or unspecified type diabetes mellitus without mention of complication, not stated as uncontrolled     Uncontrolled type 2 diabetes mellitus without complication, without long-term current use of insulin ([email or web address] 5/22/2019    Urinary incontinence        Past Surgical History:   Procedure Laterality Date    TONSILLECTOMY AND ADENOIDECTOMY      TUBAL LIGATION           ALLERGIES      Allergies   Allergen Reactions    Latex Hives    Lisinopril      Cough      Vicodin [Hydrocodone-Acetaminophen] Hives and Itching    Zocor [Simvastatin]        MEDICATIONS:      Current Outpatient Medications on File Prior to Visit   Medication Sig Dispense Refill    tiZANidine (ZANAFLEX) 4 MG tablet Take 4 mg by mouth 2 times daily      pregabalin (LYRICA) 200 MG capsule Take 200 mg by mouth nightly.       aspirin EC 81 MG EC tablet Take 1 tablet by mouth daily 90 tablet 1    albuterol sulfate HFA (PROVENTIL HFA) 108 (90 Base) MCG/ACT inhaler Inhale 2 puffs into the lungs every 6 hours as needed for Wheezing 1 Inhaler 3    tiotropium (SPIRIVA) 18 MCG inhalation capsule Inhale 1 capsule into the lungs daily 30 capsule 3    loratadine (CLARITIN) 10 MG tablet Take 1 tablet by mouth daily 30 tablet 3    fluticasone (FLONASE) 50 MCG/ACT nasal spray 1 spray by Each Nostril route daily 2 Bottle 1    Elastic Bandages & Supports (MEDICAL COMPRESSION SOCKS) MISC Knee high b/l. 1 each 0    glimepiride (AMARYL) 2 MG tablet Take 1 tablet by mouth every morning 90 tablet 1    atorvastatin (LIPITOR) 10 MG tablet TAKE 1 TABLET BY MOUTH ONCE DAILY 30 tablet 6    hydroCHLOROthiazide (HYDRODIURIL) 25 MG tablet TAKE 1 TABLET BY MOUTH Gastrointestinal: Negative for abdominal pain, blood in stool, constipation, diarrhea and nausea. Endocrine: Negative for polydipsia and polyuria. Genitourinary: Negative for dysuria and frequency. Musculoskeletal: Positive for arthralgias and myalgias. Negative for back pain and neck pain. Skin: Negative for rash and wound. Allergic/Immunologic: Positive for environmental allergies. Negative for immunocompromised state. Neurological: Negative for dizziness, syncope, weakness, numbness and headaches. Hematological: Negative for adenopathy. Does not bruise/bleed easily.         PHYSICAL EXAM:     Vitals:    07/23/20 1328   BP: (!) 151/94   Site: Left Upper Arm   Position: Sitting   Cuff Size: Large Adult   Pulse: 90   Weight: 228 lb (103.4 kg)   Height: 5' 3\" (1.6 m)     Body mass index is 40.39 kg/m². BP Readings from Last 3 Encounters:   07/23/20 (!) 151/94   06/26/20 126/81   06/18/19 113/77        Wt Readings from Last 3 Encounters:   07/23/20 228 lb (103.4 kg)   06/26/20 229 lb (103.9 kg)   07/16/19 210 lb (95.3 kg)       Physical Exam    Vitals signs and nursing note reviewed. Constitutional:       General: She is not in acute distress. Appearance: Normal appearance. She is not ill-appearing. HENT:      Head: Normocephalic and atraumatic. Nose: Congestion present. Mouth/Throat:      Mouth: Mucous membranes are moist.      Pharynx: Oropharynx is clear. No posterior oropharyngeal erythema. Eyes:      General: No scleral icterus. Extraocular Movements: Extraocular movements intact. Conjunctiva/sclera: Conjunctivae normal.      Pupils: Pupils are equal, round, and reactive to light. Neck:      Musculoskeletal: Normal range of motion and neck supple. Cardiovascular:      Rate and Rhythm: Normal rate and regular rhythm. Heart sounds: No murmur. Pulmonary:      Effort: Pulmonary effort is normal.      Breath sounds: Normal breath sounds. No stridor.  No wheezing, rhonchi or rales. Abdominal:      Tenderness: There is no abdominal tenderness. Musculoskeletal:      Right lower leg: No edema. Left lower leg: No edema. Comments: Varicose veins   Lymphadenopathy:      Cervical: No cervical adenopathy. Skin:     General: Skin is warm and dry. Neurological:      General: No focal deficit present. Mental Status: She is alert and oriented to person, place, and time.       LABORATORY FINDINGS:    CBC:  Lab Results   Component Value Date    WBC 10.5 04/01/2019    HGB 13.1 04/01/2019     04/01/2019     BMP:    Lab Results   Component Value Date     09/25/2018    K 4.4 09/25/2018     09/25/2018    CO2 27 09/25/2018    BUN 15 09/25/2018    CREATININE 0.69 09/25/2018    GLUCOSE 151 09/25/2018    GLUCOSE 201 02/17/2012     HEMOGLOBIN A1C:   Lab Results   Component Value Date    LABA1C 9.8 06/26/2020     MICROALBUMIN URINE:   Lab Results   Component Value Date    MICROALBUR 30 09/25/2018     FASTING LIPID Felix@[employer]  Lab Results   Component Value Date    LDLCHOLESTEROL 171 (H) 09/25/2018    LDLDIRECT 128 (H) 09/27/2017       LIVER PROFILE:  Lab Results   Component Value Date    ALT 31 09/25/2018    AST 20 09/25/2018    PROT 7.6 09/25/2018    BILITOT 0.40 09/25/2018    BILIDIR 0.09 02/17/2012    LABALBU 4.2 09/25/2018    LABALBU 4.3 02/17/2012      THYROID FUNCTION:   Lab Results   Component Value Date    TSH 1.78 11/20/2012      URINEANALYSIS: No results found for: LABURIN  ASSESSMENT AND PLAN:    1. Annual physical exam      2. Essential hypertension  Same meds  Advised compliance      3. Uncontrolled type 2 diabetes mellitus without complication, without long-term current use of insulin ([address]  Follow up with endocrinology    4. Chronic cough  PFT's pending  Follow up with Pulmonology      5. Person under investigation for COVID-19    - COVID-19 Ambulatory;  Future          FOLLOW UP AND INSTRUCTIONS: Return in about 6 months (around 1/23/2021). 1. [place] Oakes received counseling on the following healthy behaviors: nutrition, exercise and medication adherence    2. Reviewed prior labs and health maintenance. 3. Discussed use, benefit, and side effects of prescribed medications. Barriers to medication compliance addressed. All patient questions answered. Pt voiced understanding.          Betzaida Nichols  Attending Physician, [address] [place] [address], Internal Medicine Residency Program  [address] [place] [address]  7/23/2020, 1:45 PM

## 2020-07-23 NOTE — PROGRESS NOTES
Visit Information    Have you changed or started any medications since your last visit including any over-the-counter medicines, vitamins, or herbal medicines? no   Are you having any side effects from any of your medications? -  no  Have you stopped taking any of your medications? Is so, why? -  no    Have you seen any other physician or provider since your last visit? No  Have you had any other diagnostic tests since your last visit? No  Have you been seen in the emergency room and/or had an admission to a hospital since we last saw you? No  Have you had your routine dental cleaning in the past 6 months? no    Have you activated your [employer] account? If not, what are your barriers?  Yes     Patient Care Team:  Doris Rankin MD as PCP - General (Internal Medicine)  Doris Rankin MD as PCP - [facility]  Anni Valverde MD as Referring Physician (Internal Medicine)    Medical History Review  Past Medical, Family, and Social History reviewed and does contribute to the patient presenting condition    Health Maintenance   Topic Date Due    Diabetic retinal exam  04/14/2017    Colon Cancer Screen FIT/FOBT  07/14/2018    Diabetic microalbuminuria test  09/25/2019    Lipid screen  09/25/2019    Potassium monitoring  09/25/2019    Creatinine monitoring  09/25/2019    Diabetic foot exam  07/18/2020    Hepatitis B vaccine (1 of 3 - Risk 3-dose series) 06/26/2021 (Originally 6/28/1986)    Shingles Vaccine (1 of 2) 06/26/2021 (Originally 6/28/2017)    Flu vaccine (1) 09/01/2020    A1C test (Diabetic or Prediabetic)  09/26/2020    Breast cancer screen  06/23/2021    Cervical cancer screen  10/04/2021    DTaP/Tdap/Td vaccine (2 - Td) 11/30/2027    Pneumococcal 0-64 years Vaccine  Completed    HIV screen  Completed    Hepatitis A vaccine  Aged Out    Hib vaccine  Aged Out    Meningococcal (ACWY) vaccine  Aged Out

## 2020-07-27 ENCOUNTER — APPOINTMENT (OUTPATIENT)
Dept: GENERAL RADIOLOGY | Age: 55
End: 2020-07-27
Payer: COMMERCIAL

## 2020-07-27 ENCOUNTER — HOSPITAL ENCOUNTER (EMERGENCY)
Age: 55
Discharge: HOME | End: 2020-07-27
Attending: EMERGENCY MEDICINE
Payer: COMMERCIAL

## 2020-07-27 VITALS
RESPIRATION RATE: 18 BRPM | HEIGHT: 63 IN | WEIGHT: 215 LBS | TEMPERATURE: 98 F | DIASTOLIC BLOOD PRESSURE: 82 MMHG | HEART RATE: 102 BPM | BODY MASS INDEX: 38.09 KG/M2 | SYSTOLIC BLOOD PRESSURE: 129 MMHG | OXYGEN SATURATION: 100 %

## 2020-07-27 PROCEDURE — 96372 THER/PROPH/DIAG INJ SC/IM: CPT

## 2020-07-27 PROCEDURE — 6370000000 HC RX 637 (ALT 250 FOR IP): Performed by: EMERGENCY MEDICINE

## 2020-07-27 PROCEDURE — 73610 X-RAY EXAM OF ANKLE: CPT

## 2020-07-27 PROCEDURE — 6360000002 HC RX W HCPCS: Performed by: EMERGENCY MEDICINE

## 2020-07-27 PROCEDURE — 99283 EMERGENCY DEPT VISIT LOW MDM: CPT

## 2020-07-27 RX ORDER — OXYCODONE HYDROCHLORIDE AND ACETAMINOPHEN 5; 325 MG/1; MG/1
1 TABLET ORAL EVERY 4 HOURS PRN
Qty: 20 TABLET | Refills: 0 | Status: SHIPPED | OUTPATIENT
Start: 2020-07-27 | End: 2020-07-30

## 2020-07-27 RX ORDER — IBUPROFEN 800 MG/1
800 TABLET ORAL EVERY 8 HOURS PRN
Qty: 30 TABLET | Refills: 0 | Status: SHIPPED | OUTPATIENT
Start: 2020-07-27 | End: 2021-02-19

## 2020-07-27 RX ORDER — OXYCODONE HYDROCHLORIDE AND ACETAMINOPHEN 5; 325 MG/1; MG/1
1 TABLET ORAL ONCE
Status: COMPLETED | OUTPATIENT
Start: 2020-07-27 | End: 2020-07-27

## 2020-07-27 RX ORDER — DIPHENHYDRAMINE HCL 25 MG
12.5 TABLET ORAL EVERY 6 HOURS PRN
Status: DISCONTINUED | OUTPATIENT
Start: 2020-07-27 | End: 2020-07-27 | Stop reason: HOSPADM

## 2020-07-27 RX ORDER — DIPHENHYDRAMINE HCL 25 MG
25 CAPSULE ORAL EVERY 4 HOURS PRN
Qty: 24 CAPSULE | Refills: 0 | Status: SHIPPED | OUTPATIENT
Start: 2020-07-27 | End: 2020-08-06

## 2020-07-27 RX ORDER — KETOROLAC TROMETHAMINE 30 MG/ML
60 INJECTION, SOLUTION INTRAMUSCULAR; INTRAVENOUS ONCE
Status: COMPLETED | OUTPATIENT
Start: 2020-07-27 | End: 2020-07-27

## 2020-07-27 RX ADMIN — OXYCODONE HYDROCHLORIDE AND ACETAMINOPHEN 1 TABLET: 5; 325 TABLET ORAL at 02:51

## 2020-07-27 RX ADMIN — KETOROLAC TROMETHAMINE 60 MG: 30 INJECTION, SOLUTION INTRAMUSCULAR at 02:51

## 2020-07-27 RX ADMIN — DIPHENHYDRAMINE HCL 12.5 MG: 25 TABLET ORAL at 02:51

## 2020-07-27 ASSESSMENT — PAIN SCALES - GENERAL
PAINLEVEL_OUTOF10: 10
PAINLEVEL_OUTOF10: 10

## 2020-07-27 ASSESSMENT — PAIN DESCRIPTION - LOCATION
LOCATION: ANKLE
LOCATION: ANKLE

## 2020-07-27 ASSESSMENT — PAIN DESCRIPTION - FREQUENCY
FREQUENCY: CONTINUOUS
FREQUENCY: CONTINUOUS

## 2020-07-27 ASSESSMENT — PAIN DESCRIPTION - PAIN TYPE
TYPE: ACUTE PAIN
TYPE: ACUTE PAIN

## 2020-07-27 ASSESSMENT — PAIN DESCRIPTION - DESCRIPTORS
DESCRIPTORS: THROBBING
DESCRIPTORS: CONSTANT;DISCOMFORT

## 2020-07-27 ASSESSMENT — PAIN DESCRIPTION - ORIENTATION
ORIENTATION: LEFT
ORIENTATION: LEFT

## 2020-07-27 NOTE — ED NOTES
Pt arrived with c/o left ankle pain after a fall. She states that at about 1130 she was turning with a tray in her hand. She tried to catch herself with the left foot and it gave out sideways and pt landed on her buttocks. Pt denies any dizziness prior to fall or LOC.       Norma Bhatia RN  07/27/20 [id number]

## 2020-07-27 NOTE — ED PROVIDER NOTES
[facility] ED  eMERGENCY dEPARTMENT eNCOUnter      Pt Name: Kim Lopez  MRN: [id number]  [place] 1965  Date of evaluation: 7/27/2020  Provider: Trevro Casiano MD    [address] [place] [address]       Chief Complaint   Patient presents with   [facility] Fall     2330    Ankle Injury     left          HISTORY OF PRESENT ILLNESS  (Location/Symptom, Timing/Onset, Context/Setting, Quality, Duration, Modifying Factors, Severity.)   Kim Lopez is a 50 y.o. female who presents to the emergency department for evaluation of injury to her left ankle. Patient states she tripped over a pole tonight and rolled her left ankle. She has significant pain at the lateral aspect. She is concerned that she had a similar injury 3 years ago with her right ankle at which time she sustained a torn ligament which has continued to cause her discomfort. She has increasing pain and swelling since the event. She denies other injury or complaint      Nursing Notes were reviewed. ALLERGIES     Latex; Lisinopril; Vicodin [hydrocodone-acetaminophen]; and Zocor [simvastatin]    CURRENT MEDICATIONS       Previous Medications    ALBUTEROL SULFATE HFA (PROVENTIL HFA) 108 (90 BASE) MCG/ACT INHALER    Inhale 2 puffs into the lungs every 6 hours as needed for Wheezing    AMLODIPINE (NORVASC) 10 MG TABLET    Once a day    ASPIRIN EC 81 MG EC TABLET    Take 1 tablet by mouth daily    ATORVASTATIN (LIPITOR) 10 MG TABLET    TAKE 1 TABLET BY MOUTH ONCE DAILY    BLOOD GLUCOSE TEST STRIPS (TRUE METRIX BLOOD GLUCOSE TEST) STRIP    USE 1 STRIP TO CHECK GLUCOSE TWICE DAILY    ELASTIC BANDAGES & SUPPORTS (MEDICAL COMPRESSION SOCKS) [facility]    Knee high b/l.     FLUTICASONE (FLONASE) 50 MCG/ACT NASAL SPRAY    1 spray by Each Nostril route daily    GLIMEPIRIDE (AMARYL) 2 MG TABLET    Take 1 tablet by mouth every morning    HYDROCHLOROTHIAZIDE (HYDRODIURIL) 25 MG TABLET    TAKE 1 TABLET BY MOUTH ONCE DAILY    LANCETS [facility]    USE 1 LANCET TO CHECK GLUCOSE TWICE DAILY    LORATADINE (CLARITIN) 10 MG TABLET    Take 1 tablet by mouth daily    METFORMIN (GLUCOPHAGE) 1000 MG TABLET    TAKE 1 TABLET BY MOUTH TWICE DAILY WITH FOOD    OXAPROZIN (DAYPRO) 600 MG TABLET    TAKE 1 TABLET BY MOUTH ONCE DAILY    PREGABALIN (LYRICA) 200 MG CAPSULE    Take 200 mg by mouth nightly. TIOTROPIUM (SPIRIVA) 18 MCG INHALATION CAPSULE    Inhale 1 capsule into the lungs daily    TIZANIDINE (ZANAFLEX) 4 MG TABLET    Take 4 mg by mouth 2 times daily       PAST MEDICAL HISTORY         Diagnosis Date    Anxiety 2017    Chronic back pain     COPD (chronic obstructive pulmonary disease) ([facility]     Fibromyalgia 3/12/2019    GERD (gastroesophageal reflux disease)     resolved not taking medds    HSV-2 (herpes simplex virus 2) infection 2013    Hyperlipidemia     Hypertension     Neuropathy     feet    Obesity     Osteoarthritis     Type II or unspecified type diabetes mellitus without mention of complication, not stated as uncontrolled     Uncontrolled type 2 diabetes mellitus without complication, without long-term current use of insulin ([facility] 2019    Urinary incontinence        SURGICAL HISTORY           Procedure Laterality Date    TONSILLECTOMY AND ADENOIDECTOMY      TUBAL LIGATION           FAMILY HISTORY           Problem Relation Age of Onset    Diabetes Mother     Hypertension Mother     Cancer Father 76        rectal cancer    Breast Cancer Sister 22        had a double mastectomy    Diabetes Maternal Grandmother     Colon Cancer Neg Hx     Eclampsia Neg Hx     Ovarian Cancer Neg Hx      Labor Neg Hx     Spont Abortions Neg Hx     Stroke Neg Hx      Family Status   Relation Name Status    Mother  Alive    Father  Alive    Sister  Alive    MGM  (Not Specified)    Neg Hx  (Not Specified)        SOCIAL HISTORY      reports that she quit smoking about 3 years ago. Her smoking use included cigarettes. She has a 25.00 pack-year smoking history.  She has never used smokeless tobacco. She reports current alcohol use of about 0.8 - 1.7 standard drinks of alcohol per week. She reports that she does not use drugs. REVIEW OF SYSTEMS    (2-9 systems for level 4, 10 or more for level 5)   Review of Systems   All other systems reviewed and are negative. Except as noted above the remainder of the review of systems was reviewed and negative. PHYSICAL EXAM    (up to 7 for level 4, 8 or more for level 5)     Vitals:    07/27/20 0154   BP: 129/82   Pulse: 102   Resp: 18   Temp: 98 °F (36.7 °C)   TempSrc: Oral   SpO2: 100%   Weight: 215 lb (97.5 kg)   Height: 5' 3\" (1.6 m)       Physical exam reflects a well-nourished well-hydrated female. She is afebrile with stable vital signs to include pulse ox of 100% on room air. She is resting comfortably on the cot. No evidence of or complaint of illness or injury to the face head neck chest abdomen bilateral upper extremities or right lower extremity. With regard to the left leg no hip femoral or knee discomfort no discomfort to the proximal tib-fib region. She does have swelling of the lateral malleolus she has chronic 1+ dependent edema. No medial discomfort no calcaneal discomfort she does have pain with flexion and extension. No injury to the digits no metatarsal discomfort dorsalis pedis posterior tib pulses are present 2+. Integument without rash or lesion. She has no acute neurovascular deficits. DIAGNOSTIC RESULTS       RADIOLOGY:   Non-plain film images such as CT, Ultrasound and MRI are read by the radiologist. Plain radiographic images are visualized and preliminarily interpreted by the emergency physician with the below findings:        Interpretation per the Radiologist below, if available at the time of this note:    XR ANKLE LEFT (MIN 3 VIEWS)   Final Result   No acute bony abnormality. Mild anterior soft tissue swelling.                EMERGENCY DEPARTMENT COURSE and DIFFERENTIAL DIAGNOSIS/MDM:   Vitals:    Vitals:    07/27/20 0154   BP: 129/82   Pulse: 102   Resp: 18   Temp: 98 °F (36.7 °C)   TempSrc: Oral   SpO2: 100%   Weight: 215 lb (97.5 kg)   Height: 5' 3\" (1.6 m)     Imaging studies are reviewed. No evidence of fracture or dislocation. Presentation more consistent with ankle strain. Given her history she will follow-up with podiatry for reevaluation and additional imaging is indicated. For now she is placed in Ace wrap and Aircast both applied by nursing staff. Found to be appropriate and positioning by myself. She is neurovascularly intact before and after placement. She already has crutches. She was treated for pain while in the emergency department. She is discharged home on continued conservative management and advised follow-up with podiatry. CONSULTS:  None    PROCEDURES:  None    FINAL IMPRESSION      1. Sprain of calcaneofibular ligament of left ankle, initial encounter          DISPOSITION/PLAN   DISPOSITION    Decision to Discharge    PATIENT REFERRED TO:   Connor Guthrie, [facility]  [phone]      As needed    MD Kamlesh Sanabria [facility] 28. [address]nd [address] [place] [address] [address]            DISCHARGE MEDICATIONS:     New Prescriptions    DIPHENHYDRAMINE (BENADRYL) 25 MG CAPSULE    Take 1 capsule by mouth every 4 hours as needed for Itching    IBUPROFEN (ADVIL;MOTRIN) 800 MG TABLET    Take 1 tablet by mouth every 8 hours as needed for Pain    OXYCODONE-ACETAMINOPHEN (PERCOCET) 5-325 MG PER TABLET    Take 1 tablet by mouth every 4 hours as needed for Pain for up to 3 days. Controlled Substance Monitoring:    Acute and Chronic Pain Monitoring:   RX Monitoring 7/27/2020   Attestation -   Periodic Controlled Substance Monitoring Obtaining appropriate analgesic effect of treatment.          (Please note that portions of this note were completed with a voice recognition program.  Efforts were made to edit the dictations but occasionally words are mis-transcribed.)    Trevor Casiano MD  Attending Emergency Physician          Trevor Casiano MD  07/27/20 9674

## 2020-07-28 ENCOUNTER — CARE COORDINATION (OUTPATIENT)
Dept: CARE COORDINATION | Age: 55
End: 2020-07-28

## 2020-07-28 NOTE — CARE COORDINATION
Patient contacted regarding recent discharge and COVID-19 risk. Discussed COVID-19 related testing which was not done at this time. Test results were N/A. Patient informed of results, if available? N/A     Care Transition Nurse/ Ambulatory Care Manager contacted the patient by telephone to perform post discharge assessment. Verified name and  with patient as identifiers. Patient has following risk factors of: diabetes. CTN/ACM reviewed discharge instructions, medical action plan and red flags related to discharge diagnosis. Reviewed and educated them on any new and changed medications related to discharge diagnosis. Advised obtaining a 90-day supply of all daily and as-needed medications. Education provided regarding infection prevention, and signs and symptoms of COVID-19 and when to seek medical attention with patient who verbalized understanding. Discussed exposure protocols and quarantine [facility] [address] you at higher risk for severe illness  and given an opportunity for questions and concerns. The patient agrees to contact the COVID-19 hotline [phone] or PCP office for questions related to their healthcare. CTN/ACM provided contact information for future reference. From CDC: Are you at higher risk for severe illness?  Wash your hands often.  Avoid close contact (6 feet, which is about two arm lengths) with people who are sick.  Put distance between yourself and other people if COVID-19 is spreading in your community.  Clean and disinfect frequently touched surfaces.  Avoid all cruise travel and non-essential air travel.  Call your healthcare professional if you have concerns about COVID-19 and your underlying condition or if you are sick.     For more information on steps you can take to protect yourself, see CDC's How to Protect Yourself    Patient/family/caregiver given information for Esthela Ang and agrees to enroll yes  Patient's preferred e-mail: [email or web address]  Patient's preferred phone number: [phone]  Based on Loop alert triggers, patient will be contacted by nurse care manager for worsening symptoms. Pt will be further monitored by COVID Loop Team based on severity of symptoms and risk factors. Advance Care Planning  People with COVID-19 may have no symptoms, mild symptoms, such as fever, cough, and shortness of breath or they may have more severe illness, developing severe and fatal pneumonia. As a result, Advance Care Planning with attention to naming a health care decision maker (someone you trust to make healthcare decisions for you if you could not speak for yourself) and sharing other health care preferences is important BEFORE a possible health crisis. Please contact your Primary Care Provider to discuss Advance Care Planning. Preventing the Spread of Coronavirus Disease 2019 in Homes and Residential Communities  For the most recent information go to [email or web address]    Prevention steps for People with confirmed or suspected COVID-19 (including persons under investigation) who do not need to be hospitalized  and   People with confirmed COVID-19 who were hospitalized and determined to be medically stable to go home    Your healthcare provider and public health staff will evaluate whether you can be cared for at home. If it is determined that you do not need to be hospitalized and can be isolated at home, you will be monitored by staff from your local or state health department. You should follow the prevention steps below until a healthcare provider or local or state health department says you can return to your normal activities. Stay home except to get medical care  People who are mildly ill with COVID-19 are able to isolate at home during their illness. You should restrict activities outside your home, except for getting medical care.  Do not go to work, school, or public areas. Avoid using public transportation, ride-sharing, or taxis. Separate yourself from other people and animals in your home  People: As much as possible, you should stay in a specific room and away from other people in your home. Also, you should use a separate bathroom, if available. Animals: You should restrict contact with pets and other animals while you are sick with COVID-19, just like you would around other people. Although there have not been reports of pets or other animals becoming sick with COVID-19, it is still recommended that people sick with COVID-19 limit contact with animals until more information is known about the virus. When possible, have another member of your household care for your animals while you are sick. If you are sick with COVID-19, avoid contact with your pet, including petting, snuggling, being kissed or licked, and sharing food. If you must care for your pet or be around animals while you are sick, wash your hands before and after you interact with pets and wear a facemask. Call ahead before visiting your doctor  If you have a medical appointment, call the healthcare provider and tell them that you have or may have COVID-19. This will help the healthcare providers office take steps to keep other people from getting infected or exposed. Wear a facemask  You should wear a facemask when you are around other people (e.g., sharing a room or vehicle) or pets and before you enter a healthcare providers office. If you are not able to wear a facemask (for example, because it causes trouble breathing), then people who live with you should not stay in the same room with you, or they should wear a facemask if they enter your room. Cover your coughs and sneezes  Cover your mouth and nose with a tissue when you cough or sneeze. Throw used tissues in a lined trash can.  Immediately wash your hands with soap and water for at least 20 seconds or, if soap and water are not available, clean your hands with an alcohol-based hand  that contains at least 60% alcohol. Clean your hands often  Wash your hands often with soap and water for at least 20 seconds, especially after blowing your nose, coughing, or sneezing; going to the bathroom; and before eating or preparing food. If soap and water are not readily available, use an alcohol-based hand  with at least 60% alcohol, covering all surfaces of your hands and rubbing them together until they feel dry. Soap and water are the best option if hands are visibly dirty. Avoid touching your eyes, nose, and mouth with unwashed hands. Avoid sharing personal household items  You should not share dishes, drinking glasses, cups, eating utensils, towels, or bedding with other people or pets in your home. After using these items, they should be washed thoroughly with soap and water. Clean all high-touch surfaces everyday  High touch surfaces include counters, tabletops, doorknobs, bathroom fixtures, toilets, phones, keyboards, tablets, and bedside tables. Also, clean any surfaces that may have blood, stool, or body fluids on them. Use a household cleaning spray or wipe, according to the label instructions. Labels contain instructions for safe and effective use of the cleaning product including precautions you should take when applying the product, such as wearing gloves and making sure you have good ventilation during use of the product. Monitor your symptoms  Seek prompt medical attention if your illness is worsening (e.g., difficulty breathing). Before seeking care, call your healthcare provider and tell them that you have, or are being evaluated for, COVID-19. Put on a facemask before you enter the facility. These steps will help the healthcare providers office to keep other people in the office or waiting room from getting infected or exposed. Ask your healthcare provider to call the local or state health department.  Persons who are placed under active monitoring or facilitated self-monitoring should follow instructions provided by their local health department or occupational health professionals, as appropriate. When working with your local health department check their available hours. If you have a medical emergency and need to call 911, notify the dispatch personnel that you have, or are being evaluated for COVID-19. If possible, put on a facemask before emergency medical services arrive. Discontinuing home isolation  Patients with confirmed COVID-19 should remain under home isolation precautions until the risk of secondary transmission to others is thought to be low. The decision to discontinue home isolation precautions should be made on a case-by-case basis, in consultation with healthcare providers and state and local health departments.

## 2020-08-07 ENCOUNTER — TELEPHONE (OUTPATIENT)
Dept: INTERNAL MEDICINE | Age: 55
End: 2020-08-07

## 2020-08-07 NOTE — TELEPHONE ENCOUNTER
Pt calling states she needs a covid test to get PFT done. Pt does not want the test that goes in the nose as she feels it is unnecessary to test such a way. Writer told her she didn't know of a place that test different. Writer LM with [facility] testing facility to call back to find out how they do their testing.

## 2020-08-10 NOTE — TELEPHONE ENCOUNTER
[facility] called back they only do testing for their pts and pts that are having procedures done there. Writer called pt and explained that if she wanted to get tested another way she will have to find out where she can go to get tested.

## 2020-08-17 ENCOUNTER — HOSPITAL ENCOUNTER (OUTPATIENT)
Dept: ULTRASOUND IMAGING | Age: 55
End: 2020-08-17
Payer: COMMERCIAL

## 2020-08-17 ENCOUNTER — HOSPITAL ENCOUNTER (OUTPATIENT)
Dept: MAMMOGRAPHY | Age: 55
Discharge: HOME | End: 2020-08-19
Payer: COMMERCIAL

## 2020-08-17 PROCEDURE — G0279 TOMOSYNTHESIS, MAMMO: HCPCS

## 2020-09-02 ENCOUNTER — TELEPHONE (OUTPATIENT)
Dept: INTERNAL MEDICINE | Age: 55
End: 2020-09-02

## 2020-09-02 NOTE — TELEPHONE ENCOUNTER
PC, calling pt's on the A1c list , pt reports she is only taking the metformin 1000 mg twice a day she is not taking the glimepiride. Pt states the glimepiride makes her feel weird and sluggish. Pt also reported she has stopped the HCTZ because she has no energy. Writer inquired about pt's BS's pt stated she doesn't take her BS because she does not like to poke herself it makes her fingers sore . Pt inquired about a CGM to wear on her arm. Writer suggested pt see the endocrinologist her PCP referred her to they sometimes will trial the CGM with the pt . Pt asked if the referral could be mailed to her and she would call and make an appt with endocrinology.

## 2020-09-23 ENCOUNTER — TELEPHONE (OUTPATIENT)
Dept: INTERNAL MEDICINE | Age: 55
End: 2020-09-23

## 2020-09-24 NOTE — TELEPHONE ENCOUNTER
Can I please get Rheumatology notes?  Thanks
Patient is calling in regards. Please advise & fill if appropriate.
Phone call to Dr. Mireille Alvarez office - Will scan to media once received.
Patient Active Problem List:     Hyperlipidemia     Pain syndrome, chronic     Anxiety     Mucoid cyst of joint     Post-traumatic osteoarthritis of left hand     Essential hypertension     Fibromyalgia     Uncontrolled type 2 diabetes mellitus without complication, without long-term current use of insulin ([facility]

## 2020-09-25 RX ORDER — PREGABALIN 200 MG/1
200 CAPSULE ORAL NIGHTLY
Qty: 30 CAPSULE | Refills: 0 | Status: SHIPPED | OUTPATIENT
Start: 2020-09-25 | End: 2020-11-10

## 2020-10-01 ENCOUNTER — TELEPHONE (OUTPATIENT)
Dept: INTERNAL MEDICINE | Age: 55
End: 2020-10-01

## 2020-10-01 NOTE — TELEPHONE ENCOUNTER
Patient is calling demanding a letter be composed today stating she is high-risk due to her uncontrolled diabetes & chronic cough. Patient states \"I needed this back in May & she wouldn't do it, so she needs to do it TODAY. \" \"I'm not asking her to take me off work, I just need the letter to state the FACTS. \" Patient states she will be calling back to make sure it is done. Informed patient that I cannot make you do anything, especially if you already discussed this with her, but I would send you the message.

## 2020-10-07 ENCOUNTER — TELEPHONE (OUTPATIENT)
Dept: FAMILY MEDICINE CLINIC | Age: 55
End: 2020-10-07

## 2020-11-07 NOTE — TELEPHONE ENCOUNTER
Refill request for Lyrica. If appropriate please send medication(s) to patients pharmacy. Next appt: Patient is currently on wait list for provider. Last appt: 7/23/2020    Health Maintenance   Topic Date Due    Diabetic retinal exam  04/14/2017    Diabetic microalbuminuria test  09/25/2019    Diabetic foot exam  07/18/2020    Flu vaccine (1) 09/01/2020    A1C test (Diabetic or Prediabetic)  10/23/2020    Hepatitis B vaccine (1 of 3 - Risk 3-dose series) 06/26/2021 (Originally 6/28/1984)    Shingles Vaccine (1 of 2) 06/26/2021 (Originally 6/28/2017)    Lipid screen  07/23/2021    Potassium monitoring  07/23/2021    Creatinine monitoring  07/23/2021    Breast cancer screen  08/17/2021    Cervical cancer screen  10/04/2021    Colon cancer screen fecal DNA test (Cologuard)  08/19/2023    DTaP/Tdap/Td vaccine (2 - Td) 11/30/2027    Pneumococcal 0-64 years Vaccine  Completed    HIV screen  Completed    Hepatitis A vaccine  Aged Out    Hib vaccine  Aged Out    Meningococcal (ACWY) vaccine  Aged Out       Hemoglobin A1C (%)   Date Value   07/23/2020 9.7 (H)   06/26/2020 9.8   03/12/2019 7.6             ( goal A1C is < 7)   Microalb/Crt.  Ratio (mcg/mg creat)   Date Value   09/25/2018 12     LDL Cholesterol (mg/dL)   Date Value   07/23/2020 88       (goal LDL is <100)   AST (U/L)   Date Value   07/23/2020 21     ALT (U/L)   Date Value   07/23/2020 30     BUN (mg/dL)   Date Value   07/23/2020 13     BP Readings from Last 3 Encounters:   07/27/20 129/82   07/23/20 (!) 154/88   06/26/20 126/81          (goal 120/80)          Patient Active Problem List:     Hyperlipidemia     Pain syndrome, chronic     Anxiety     Mucoid cyst of joint     Post-traumatic osteoarthritis of left hand     Essential hypertension     Fibromyalgia     Uncontrolled type 2 diabetes mellitus without complication, without long-term current use of insulin

## 2020-11-10 RX ORDER — PREGABALIN 200 MG/1
CAPSULE ORAL
Qty: 30 CAPSULE | Refills: 1 | Status: SHIPPED | OUTPATIENT
Start: 2020-11-10 | End: 2022-10-29

## 2020-12-02 ENCOUNTER — HOSPITAL ENCOUNTER (OUTPATIENT)
Age: 55
Discharge: HOME | End: 2020-12-02
Payer: COMMERCIAL

## 2020-12-02 ENCOUNTER — HOSPITAL ENCOUNTER (OUTPATIENT)
Age: 55
Discharge: HOME | End: 2020-12-04
Payer: COMMERCIAL

## 2020-12-02 ENCOUNTER — HOSPITAL ENCOUNTER (OUTPATIENT)
Dept: GENERAL RADIOLOGY | Age: 55
Discharge: HOME | End: 2020-12-04
Payer: COMMERCIAL

## 2020-12-02 LAB
ABSOLUTE EOS #: 0.49 K/UL (ref 0–0.44)
ABSOLUTE IMMATURE GRANULOCYTE: 0.06 K/UL (ref 0–0.3)
ABSOLUTE LYMPH #: 4.11 K/UL (ref 1.1–3.7)
ABSOLUTE MONO #: 0.5 K/UL (ref 0.1–1.2)
AFP: 2.1 UG/L
ALBUMIN SERPL-MCNC: 3.9 G/DL (ref 3.5–5.2)
ALBUMIN/GLOBULIN RATIO: 1.3 (ref 1–2.5)
ALP BLD-CCNC: 89 U/L (ref 35–104)
ALT SERPL-CCNC: 25 U/L (ref 5–33)
ANION GAP SERPL CALCULATED.3IONS-SCNC: 16 MMOL/L (ref 9–17)
AST SERPL-CCNC: 22 U/L
BASOPHILS # BLD: 1 % (ref 0–2)
BASOPHILS ABSOLUTE: 0.09 K/UL (ref 0–0.2)
BILIRUB SERPL-MCNC: <0.1 MG/DL (ref 0.3–1.2)
BILIRUBIN URINE: NEGATIVE
BUN BLDV-MCNC: 11 MG/DL (ref 6–20)
BUN/CREAT BLD: ABNORMAL (ref 9–20)
CA 125: 11 U/ML
CA 19-9: 14 U/ML (ref 0–35)
CALCIUM SERPL-MCNC: 9.7 MG/DL (ref 8.6–10.4)
CARCINOEMBRYONIC ANTIGEN: 1.3 NG/ML
CHLORIDE BLD-SCNC: 105 MMOL/L (ref 98–107)
CHOLESTEROL, FASTING: 168 MG/DL
CHOLESTEROL/HDL RATIO: 4.9
CO2: 21 MMOL/L (ref 20–31)
COLOR: YELLOW
COMMENT UA: ABNORMAL
CREAT SERPL-MCNC: 0.81 MG/DL (ref 0.5–0.9)
DIFFERENTIAL TYPE: ABNORMAL
EOSINOPHILS RELATIVE PERCENT: 4 % (ref 1–4)
GFR AFRICAN AMERICAN: >60 ML/MIN
GFR NON-AFRICAN AMERICAN: >60 ML/MIN
GFR SERPL CREATININE-BSD FRML MDRD: ABNORMAL ML/MIN/{1.73_M2}
GFR SERPL CREATININE-BSD FRML MDRD: ABNORMAL ML/MIN/{1.73_M2}
GLUCOSE BLD-MCNC: 308 MG/DL (ref 70–99)
GLUCOSE URINE: ABNORMAL
HCT VFR BLD CALC: 40.2 % (ref 36.3–47.1)
HDLC SERPL-MCNC: 34 MG/DL
HEMOGLOBIN: 13.2 G/DL (ref 11.9–15.1)
IMMATURE GRANULOCYTES: 1 %
KETONES, URINE: NEGATIVE
LDL CHOLESTEROL DIRECT: 84 MG/DL
LDL CHOLESTEROL: ABNORMAL MG/DL (ref 0–130)
LEUKOCYTE ESTERASE, URINE: NEGATIVE
LYMPHOCYTES # BLD: 36 % (ref 24–43)
MCH RBC QN AUTO: 32 PG (ref 25.2–33.5)
MCHC RBC AUTO-ENTMCNC: 32.8 G/DL (ref 28.4–34.8)
MCV RBC AUTO: 97.3 FL (ref 82.6–102.9)
MONOCYTES # BLD: 4 % (ref 3–12)
NITRITE, URINE: NEGATIVE
NRBC AUTOMATED: 0 PER 100 WBC
PDW BLD-RTO: 12.1 % (ref 11.8–14.4)
PH UA: 5 (ref 5–8)
PLATELET # BLD: 318 K/UL (ref 138–453)
PLATELET ESTIMATE: ABNORMAL
PMV BLD AUTO: 10.4 FL (ref 8.1–13.5)
POTASSIUM SERPL-SCNC: 4.3 MMOL/L (ref 3.7–5.3)
PROTEIN UA: NEGATIVE
RBC # BLD: 4.13 M/UL (ref 3.95–5.11)
RBC # BLD: ABNORMAL 10*6/UL
SEG NEUTROPHILS: 54 % (ref 36–65)
SEGMENTED NEUTROPHILS ABSOLUTE COUNT: 6.04 K/UL (ref 1.5–8.1)
SODIUM BLD-SCNC: 142 MMOL/L (ref 135–144)
SPECIFIC GRAVITY UA: 1.02 (ref 1–1.03)
TOTAL PROTEIN: 6.8 G/DL (ref 6.4–8.3)
TRIGLYCERIDE, FASTING: 407 MG/DL
TSH SERPL DL<=0.05 MIU/L-ACNC: 2.46 MIU/L (ref 0.3–5)
TURBIDITY: CLEAR
URINE HGB: NEGATIVE
UROBILINOGEN, URINE: NORMAL
VITAMIN D 25-HYDROXY: 16.4 NG/ML (ref 30–100)
VLDLC SERPL CALC-MCNC: ABNORMAL MG/DL (ref 1–30)
WBC # BLD: 11.3 K/UL (ref 3.5–11.3)
WBC # BLD: ABNORMAL 10*3/UL

## 2020-12-02 PROCEDURE — 83721 ASSAY OF BLOOD LIPOPROTEIN: CPT

## 2020-12-02 PROCEDURE — 36415 COLL VENOUS BLD VENIPUNCTURE: CPT

## 2020-12-02 PROCEDURE — 81003 URINALYSIS AUTO W/O SCOPE: CPT

## 2020-12-02 PROCEDURE — 93005 ELECTROCARDIOGRAM TRACING: CPT | Performed by: FAMILY MEDICINE

## 2020-12-02 PROCEDURE — 85025 COMPLETE CBC W/AUTO DIFF WBC: CPT

## 2020-12-02 PROCEDURE — 84443 ASSAY THYROID STIM HORMONE: CPT

## 2020-12-02 PROCEDURE — 82306 VITAMIN D 25 HYDROXY: CPT

## 2020-12-02 PROCEDURE — 80061 LIPID PANEL: CPT

## 2020-12-02 PROCEDURE — 82378 CARCINOEMBRYONIC ANTIGEN: CPT

## 2020-12-02 PROCEDURE — 71046 X-RAY EXAM CHEST 2 VIEWS: CPT

## 2020-12-02 PROCEDURE — 80053 COMPREHEN METABOLIC PANEL: CPT

## 2020-12-02 PROCEDURE — 82105 ALPHA-FETOPROTEIN SERUM: CPT

## 2020-12-02 PROCEDURE — 86304 IMMUNOASSAY TUMOR CA 125: CPT

## 2020-12-02 PROCEDURE — 86301 IMMUNOASSAY TUMOR CA 19-9: CPT

## 2020-12-03 LAB
EKG ATRIAL RATE: 77 BPM
EKG P AXIS: 6 DEGREES
EKG P-R INTERVAL: 132 MS
EKG Q-T INTERVAL: 412 MS
EKG QRS DURATION: 76 MS
EKG QTC CALCULATION (BAZETT): 466 MS
EKG R AXIS: 15 DEGREES
EKG T AXIS: 53 DEGREES
EKG VENTRICULAR RATE: 77 BPM

## 2020-12-03 PROCEDURE — 93010 ELECTROCARDIOGRAM REPORT: CPT | Performed by: INTERNAL MEDICINE

## 2020-12-04 RX ORDER — AMLODIPINE BESYLATE 10 MG/1
TABLET ORAL
Qty: 90 TABLET | Refills: 0 | Status: SHIPPED | OUTPATIENT
Start: 2020-12-04 | End: 2021-02-19

## 2020-12-04 NOTE — TELEPHONE ENCOUNTER
On waitlist     Last Visit Date:  7-23-20  Next Visit Date:  Visit date not found    Hemoglobin A1C (%)   Date Value   07/23/2020 9.7 (H)   06/26/2020 9.8   03/12/2019 7.6             ( goal A1C is < 7)   Microalb/Crt.  Ratio (mcg/mg creat)   Date Value   09/25/2018 12     LDL Cholesterol (mg/dL)   Date Value   12/02/2020            (goal LDL is <100)   AST (U/L)   Date Value   12/02/2020 22     ALT (U/L)   Date Value   12/02/2020 25     BUN (mg/dL)   Date Value   12/02/2020 11     BP Readings from Last 3 Encounters:   07/27/20 129/82   07/23/20 (!) 154/88   06/26/20 126/81          (goal 120/80)        Patient Active Problem List:     Hyperlipidemia     Pain syndrome, chronic     Anxiety     Mucoid cyst of joint     Post-traumatic osteoarthritis of left hand     Essential hypertension     Fibromyalgia     Uncontrolled type 2 diabetes mellitus without complication, without long-term current use of insulin      ----Jeaneth Boom

## 2021-01-06 RX ORDER — TIZANIDINE 4 MG/1
4 TABLET ORAL 2 TIMES DAILY
Qty: 60 TABLET | Refills: 0 | Status: SHIPPED | OUTPATIENT
Start: 2021-01-06 | End: 2021-02-17 | Stop reason: SDUPTHER

## 2021-01-06 NOTE — TELEPHONE ENCOUNTER
Pt requesting medication refill, tizanidine    Next Visit Date:last seen 9/25/20  No future appointments. Health Maintenance   Topic Date Due    Diabetic retinal exam  04/14/2017    Diabetic microalbuminuria test  09/25/2019    Diabetic foot exam  07/18/2020    Flu vaccine (1) 09/01/2020    A1C test (Diabetic or Prediabetic)  10/23/2020    Hepatitis B vaccine (1 of 3 - Risk 3-dose series) 06/26/2021 (Originally 6[birth year]28/1984)    Shingles Vaccine (1 of 2) 06/26/2021 (Originally 6/28/2017)    Breast cancer screen  08/17/2021    Cervical cancer screen  10/04/2021    Lipid screen  12/02/2021    Potassium monitoring  12/02/2021    Creatinine monitoring  12/02/2021    Colon cancer screen fecal DNA test (Cologuard)  08/19/2023    DTaP/Tdap/Td vaccine (2 - Td) 11/30/2027    Pneumococcal 0-64 years Vaccine  Completed    Hepatitis C screen  Completed    HIV screen  Completed    Hepatitis A vaccine  Aged Out    Hib vaccine  Aged Out    Meningococcal (ACWY) vaccine  Aged Out       Hemoglobin A1C (%)   Date Value   07/23/2020 9.7 (H)   06/26/2020 9.8   03/12/2019 7.6             ( goal A1C is < 7)   Microalb/Crt.  Ratio (mcg/mg creat)   Date Value   09/25/2018 12     LDL Cholesterol (mg/dL)   Date Value   12/02/2020 07/23/2020 88       (goal LDL is <100)   AST (U/L)   Date Value   12/02/2020 22     ALT (U/L)   Date Value   12/02/2020 25     BUN (mg/dL)   Date Value   12/02/2020 11     BP Readings from Last 3 Encounters:   07/27/20 129/82   07/23/20 (!) 154/88   06/26/20 126/81          (goal 120/80)    All Future Testing planned in [facility]  Lab Frequency Next Occurrence   Full PFT Study With Bronchodilator Once 05/20/2020   COVID-19 Ambulatory Once 02/19/2021   ISAAC DIGITAL DIAGNOSTIC W OR WO CAD LEFT Once 02/28/2021               Patient Active Problem List:     Hyperlipidemia     Pain syndrome, chronic     Anxiety     Mucoid cyst of joint     Post-traumatic osteoarthritis of left hand     Essential hypertension     Fibromyalgia     Uncontrolled type 2 diabetes mellitus without complication, without long-term current use of insulin

## 2021-02-17 RX ORDER — TIZANIDINE 4 MG/1
4 TABLET ORAL 2 TIMES DAILY
Qty: 60 TABLET | Refills: 0 | Status: SHIPPED | OUTPATIENT
Start: 2021-02-17

## 2021-02-17 NOTE — TELEPHONE ENCOUNTER
Refill request for Tizanidine. If appropriate please send medication(s) to patients pharmacy. Next appt: 2/19/2021    Health Maintenance   Topic Date Due    Diabetic retinal exam  04/14/2017    Diabetic microalbuminuria test  09/25/2019    Diabetic foot exam  07/18/2020    Flu vaccine (1) 09/01/2020    A1C test (Diabetic or Prediabetic)  10/23/2020    Hepatitis B vaccine (1 of 3 - Risk 3-dose series) 06/26/2021 (Originally 6/28/1984)    Shingles Vaccine (1 of 2) 06/26/2021 (Originally 6/28/2017)    Breast cancer screen  08/17/2021    Cervical cancer screen  10/04/2021    Lipid screen  12/02/2021    Potassium monitoring  12/02/2021    Creatinine monitoring  12/02/2021    Colon cancer screen fecal DNA test (Cologuard)  08/19/2023    DTaP/Tdap/Td vaccine (2 - Td) 11/30/2027    Pneumococcal 0-64 years Vaccine  Completed    Hepatitis C screen  Completed    HIV screen  Completed    Hepatitis A vaccine  Aged Out    Hib vaccine  Aged Out    Meningococcal (ACWY) vaccine  Aged Out       Hemoglobin A1C (%)   Date Value   07/23/2020 9.7 (H)   06/26/2020 9.8   03/12/2019 7.6             ( goal A1C is < 7)   Microalb/Crt.  Ratio (mcg/mg creat)   Date Value   09/25/2018 12     LDL Cholesterol (mg/dL)   Date Value   12/02/2020            (goal LDL is <100)   AST (U/L)   Date Value   12/02/2020 22     ALT (U/L)   Date Value   12/02/2020 25     BUN (mg/dL)   Date Value   12/02/2020 11     BP Readings from Last 3 Encounters:   07/27/20 129/82   07/23/20 (!) 154/88   06/26/20 126/81          (goal 120/80)          Patient Active Problem List:     Hyperlipidemia     Pain syndrome, chronic     Anxiety     Mucoid cyst of joint     Post-traumatic osteoarthritis of left hand     Essential hypertension     Fibromyalgia     Uncontrolled type 2 diabetes mellitus without complication, without long-term current use of insulin

## 2021-02-19 ENCOUNTER — VIRTUAL VISIT (OUTPATIENT)
Dept: INTERNAL MEDICINE | Age: 56
End: 2021-02-19
Payer: COMMERCIAL

## 2021-02-19 DIAGNOSIS — E66.01 CLASS 2 SEVERE OBESITY DUE TO EXCESS CALORIES WITH SERIOUS COMORBIDITY AND BODY MASS INDEX (BMI) OF 38.0 TO 38.9 IN ADULT (HCC): ICD-10-CM

## 2021-02-19 DIAGNOSIS — M79.89 LEG SWELLING: Primary | ICD-10-CM

## 2021-02-19 DIAGNOSIS — E55.9 VITAMIN D DEFICIENCY: ICD-10-CM

## 2021-02-19 DIAGNOSIS — E78.2 MIXED HYPERLIPIDEMIA: ICD-10-CM

## 2021-02-19 DIAGNOSIS — E11.9 TYPE 2 DIABETES MELLITUS WITHOUT COMPLICATION, WITHOUT LONG-TERM CURRENT USE OF INSULIN (HCC): ICD-10-CM

## 2021-02-19 DIAGNOSIS — I10 ESSENTIAL HYPERTENSION: ICD-10-CM

## 2021-02-19 DIAGNOSIS — G47.33 OSA (OBSTRUCTIVE SLEEP APNEA): ICD-10-CM

## 2021-02-19 DIAGNOSIS — R92.8 ABNORMAL MAMMOGRAM OF LEFT BREAST: ICD-10-CM

## 2021-02-19 DIAGNOSIS — M79.7 FIBROMYALGIA: ICD-10-CM

## 2021-02-19 PROCEDURE — 99214 OFFICE O/P EST MOD 30 MIN: CPT | Performed by: STUDENT IN AN ORGANIZED HEALTH CARE EDUCATION/TRAINING PROGRAM

## 2021-02-19 RX ORDER — B-COMPLEX WITH VITAMIN C
1 TABLET ORAL DAILY
Qty: 30 TABLET | Refills: 0 | Status: SHIPPED | OUTPATIENT
Start: 2021-02-19 | End: 2022-02-19

## 2021-02-19 RX ORDER — PHENTERMINE HYDROCHLORIDE 15 MG/1
15 CAPSULE ORAL EVERY MORNING
Qty: 30 CAPSULE | Refills: 0 | Status: SHIPPED | OUTPATIENT
Start: 2021-02-19 | End: 2021-03-21

## 2021-02-19 RX ORDER — OMEGA-3 FATTY ACIDS/FISH OIL 300-1000MG
1 CAPSULE ORAL DAILY
Qty: 60 CAPSULE | Refills: 1 | Status: SHIPPED | OUTPATIENT
Start: 2021-02-19 | End: 2021-10-29 | Stop reason: ALTCHOICE

## 2021-02-19 SDOH — ECONOMIC STABILITY: FOOD INSECURITY: WITHIN THE PAST 12 MONTHS, THE FOOD YOU BOUGHT JUST DIDN'T LAST AND YOU DIDN'T HAVE MONEY TO GET MORE.: SOMETIMES TRUE

## 2021-02-19 SDOH — ECONOMIC STABILITY: TRANSPORTATION INSECURITY
IN THE PAST 12 MONTHS, HAS LACK OF TRANSPORTATION KEPT YOU FROM MEETINGS, WORK, OR FROM GETTING THINGS NEEDED FOR DAILY LIVING?: YES

## 2021-02-19 SDOH — ECONOMIC STABILITY: FOOD INSECURITY: WITHIN THE PAST 12 MONTHS, YOU WORRIED THAT YOUR FOOD WOULD RUN OUT BEFORE YOU GOT MONEY TO BUY MORE.: OFTEN TRUE

## 2021-02-19 ASSESSMENT — PATIENT HEALTH QUESTIONNAIRE - PHQ9
3. TROUBLE FALLING OR STAYING ASLEEP: 3
9. THOUGHTS THAT YOU WOULD BE BETTER OFF DEAD, OR OF HURTING YOURSELF: 2
8. MOVING OR SPEAKING SO SLOWLY THAT OTHER PEOPLE COULD HAVE NOTICED. OR THE OPPOSITE, BEING SO FIGETY OR RESTLESS THAT YOU HAVE BEEN MOVING AROUND A LOT MORE THAN USUAL: 3
10. IF YOU CHECKED OFF ANY PROBLEMS, HOW DIFFICULT HAVE THESE PROBLEMS MADE IT FOR YOU TO DO YOUR WORK, TAKE CARE OF THINGS AT HOME, OR GET ALONG WITH OTHER PEOPLE: 3
7. TROUBLE CONCENTRATING ON THINGS, SUCH AS READING THE NEWSPAPER OR WATCHING TELEVISION: 3
SUM OF ALL RESPONSES TO PHQ QUESTIONS 1-9: 26
SUM OF ALL RESPONSES TO PHQ9 QUESTIONS 1 & 2: 6
2. FEELING DOWN, DEPRESSED OR HOPELESS: 3

## 2021-02-19 ASSESSMENT — COLUMBIA-SUICIDE SEVERITY RATING SCALE - C-SSRS: 1. WITHIN THE PAST MONTH, HAVE YOU WISHED YOU WERE DEAD OR WISHED YOU COULD GO TO SLEEP AND NOT WAKE UP?: YES

## 2021-02-19 NOTE — PROGRESS NOTES
Maurice Purdy is a 50 y.o. female evaluated via telephone on 2/19/2021. Consent:  She and/or health care decision maker is aware that that she may receive a bill for this telephone service, depending on her insurance coverage, and has provided verbal consent to proceed: Yes      Documentation:  I communicated with the patient and/or health care decision maker about patient's chronic leg swelling bilaterally and her comorbid conditions like hypertension, diabetes, hyperlipidemia and fibromyalgia. Details of this discussion including any medical advice provided:     The patient is a 56-year-old female who requested a virtual visit because she was not able to get ROBYN hose stockings that were ordered to her by her PCP. She stated that the insurance did not pay for it. The patient has complaints of swelling in the legs bilaterally which she states is present all the time. The swelling gets worse when she is walking or standing. On lying down or elevating her legs, the swelling goes down. She denies any complaints of shortness of breath or chest pain. She does have to wake up in the middle of the night very rarely to catch her breath. The patient states that she has been using 3 pillows at night to sleep. The patient's risk factors include hypertension, diabetes mellitus, hyperlipidemia and previous smoking history which she stated she smoked for 30 years. No previous echo on file. Considering hypertension, the patient takes Norvasc 10 mg daily. She does not take HCTZ but it was appearing on the medicine list.  She states that her blood pressure has been normal and usually is in the normal range which is less than 120/90. Discussed with the patient in detail that Norvasc is a medicine that can cause lower extremity edema. Considering her blood pressure to be normal, requested her to stop taking Norvasc and monitor her blood pressure.     Considering diabetes mellitus type 2, the patient is on Amaryl 2 mg daily and metformin 1000 mg twice daily. The patient had hemoglobin A1c of 9.7 in July 2020. Her protein to creatinine ratio was normal in September 2018. The patient's fasting blood glucose levels are running between 180-190. After eating, the patient's blood glucose levels are 325. The patient stated that she feels angry all the time and is constantly eating. The patient is obese with a BMI of 38. The patient is not interested in bariatrics at this time. The patient is interested in losing weight and requesting Adderall for weight loss. For hyperlipidemia, the patient is on Lipitor 10 mg daily. Her lipid profile in December 2020 showed cholesterol levels of 168, HDL of 34 and triglyceride levels more than 400. The patient's ASCVD risk score is 5.8%. The patient is on aspirin as well. We will start the patient on omega-3 fatty acid for her elevated triglyceride levels. For fibromyalgia, the patient is on Zanaflex and Lyrica. The patient was given tramadol by her rheumatologist few days back. The patient is not using any NSAIDs because they upset her stomach. The patient also has vitamin D deficiency with levels of 16.4 in the summer 2020. The patient was tested for sleep apnea in June 2019. The patient does not have any CPAP at this time. The patient needs another sleep study to get authorization for CPAP machine. The patient uses albuterol, Spiriva, Flonase and Claritin. She states that she has COPD but there are no official PFT records on file. Regarding health maintenance, the patient had a mammogram done in August 2020 which showed septal changes in microcalcification in the upper outer quadrant of the left breast.  Follow-up recommended in 6 months with another mammogram.  The patient is not interested in biopsy. Regarding colon cancer, her Cologuard was negative in August 2020. The patient is not a candidate for lung cancer screening at this time.   The patient quit smoking with her previous history of smoking for more than 30 years. The patient stated that her mother  of lung cancer. The patient is interested in getting a CT scan. The patient had negative Pap with HPV screening in 2016. The patient will benefit from ROBYN hose stockings because of her chronic lower extremity swelling. The swelling goes down when she elevates her legs. The swelling is causing distress and the patient's daily life activities. Recommendations following virtual visit included stop taking Norvasc and use ROBYN hose stockings for her bilateral lower extremity swelling. Patient stated that she had a stress test in December at [address] which was negative. Holding off on echo at this time and will try to obtain stress test results. Modify dietary habits. We will start the patient on phentermine for weight loss. We will also check hemoglobin A1c and protein to creatinine ratio. Continue to follow-up with rheumatology for fibromyalgia. We will start the patient on omega-3 fatty acids for elevated triglyceride levels and calcium and vitamin D supplements for vitamin D deficiency. The patient completed the course of vitamin D 50,000 units. Will order another sleep study for authorization for CPAP machine. Patient was requesting Adderall because of her lack of concentration and memory deficit. Explained to the patient in detail that her memory issues are because of her worsening sleep apnea for which she has not been on CPAP. Her sleep apnea was diagnosed back in . The patient needs to get her sleep study done and start using CPAP. Adderall is not the treatment for her memory issues at this time. She needs to get her sleep apnea under control and her memory will improve. The patient will need a repeat mammogram as a follow-up.       I affirm this is a Patient Initiated Episode with a Patient who has not had a related appointment within my department in the past 7 days or scheduled within the next 24 hours.     Patient identification was verified at the start of the visit: Yes    Total Time: minutes: 21-30 minutes    Note: not billable if this call serves to triage the patient into an appointment for the relevant concern    Shawna Pisano MD  PGY-3, Department of Internal Medicine  [address], [place]  2/19/2021 2:51 PM

## 2021-02-19 NOTE — PROGRESS NOTES
Attending Physician Statement  I have discussed the care of Roya Wade including pertinent history and exam findings,  with the resident. I have reviewed the key elements of all parts of the encounter with the resident. I agree with the assessment, plan and orders as documented by the resident.   (GE Modifier)    MD HITESH Montelongo  Attending Physician, [address] [place] [address], Internal Medicine Residency Program  [address] [place] [address]  2/19/2021, 3:11 PM

## 2021-02-24 ENCOUNTER — TELEPHONE (OUTPATIENT)
Dept: INTERNAL MEDICINE | Age: 56
End: 2021-02-24

## 2021-02-24 NOTE — TELEPHONE ENCOUNTER
Fax here from [facility]. The Prior Authorization Request for Omega-3-acid Ethyl Esters 1GM capsules has been denied. Denial information scanned to this encounter. Please review and advise.      Please discontinue denied medication in patients medication list.

## 2021-02-24 NOTE — TELEPHONE ENCOUNTER
PA request received for Omega-3-acid Ethyl Esters 1GM capsules    PA processed and submitted to pt insurance, waiting for response in regards to medication coverage

## 2021-03-01 ENCOUNTER — TELEPHONE (OUTPATIENT)
Dept: INTERNAL MEDICINE | Age: 56
End: 2021-03-01

## 2021-03-01 DIAGNOSIS — E11.9 TYPE 2 DIABETES MELLITUS WITHOUT COMPLICATION, WITHOUT LONG-TERM CURRENT USE OF INSULIN (HCC): Primary | ICD-10-CM

## 2021-03-01 DIAGNOSIS — E66.01 CLASS 2 SEVERE OBESITY DUE TO EXCESS CALORIES WITH SERIOUS COMORBIDITY AND BODY MASS INDEX (BMI) OF 38.0 TO 38.9 IN ADULT (HCC): ICD-10-CM

## 2021-03-01 NOTE — TELEPHONE ENCOUNTER
Patient calls requesting a referral    To:  Bariatric/ weight management  For: Weight loss surgery    Referral is pended    Ok to leave a message if we call back yes

## 2021-03-02 RX ORDER — ATORVASTATIN CALCIUM 10 MG/1
TABLET, FILM COATED ORAL
Qty: 30 TABLET | Refills: 0 | Status: SHIPPED | OUTPATIENT
Start: 2021-03-02 | End: 2021-04-08

## 2021-03-02 NOTE — TELEPHONE ENCOUNTER
Request for Lipitor. Next Visit Date:  No future appointments. Health Maintenance   Topic Date Due    Diabetic retinal exam  04/14/2017    Diabetic microalbuminuria test  09/25/2019    Diabetic foot exam  07/18/2020    Flu vaccine (1) 09/01/2020    A1C test (Diabetic or Prediabetic)  10/23/2020    Hepatitis B vaccine (1 of 3 - Risk 3-dose series) 06/26/2021 (Originally 6/28/1984)    Shingles Vaccine (1 of 2) 06/26/2021 (Originally 6/28/2017)    Breast cancer screen  08/17/2021    Cervical cancer screen  10/04/2021    Lipid screen  12/02/2021    Potassium monitoring  12/02/2021    Creatinine monitoring  12/02/2021    Colon cancer screen fecal DNA test (Cologuard)  08/19/2023    DTaP/Tdap/Td vaccine (2 - Td) 11/30/2027    Pneumococcal 0-64 years Vaccine  Completed    Hepatitis C screen  Completed    HIV screen  Completed    Hepatitis A vaccine  Aged Out    Hib vaccine  Aged Out    Meningococcal (ACWY) vaccine  Aged Out       Hemoglobin A1C (%)   Date Value   07/23/2020 9.7 (H)   06/26/2020 9.8   03/12/2019 7.6             ( goal A1C is < 7)   Microalb/Crt.  Ratio (mcg/mg creat)   Date Value   09/25/2018 12     LDL Cholesterol (mg/dL)   Date Value   12/02/2020            (goal LDL is <100)   AST (U/L)   Date Value   12/02/2020 22     ALT (U/L)   Date Value   12/02/2020 25     BUN (mg/dL)   Date Value   12/02/2020 11     BP Readings from Last 3 Encounters:   07/27/20 129/82   07/23/20 (!) 154/88   06/26/20 126/81          (goal 120/80)    All Future Testing planned in [facility]  Lab Frequency Next Occurrence   Full PFT Study With Bronchodilator Once 05/20/2020   COVID-19 Ambulatory Once 07/23/2021   ISAAC DIGITAL DIAGNOSTIC W OR WO CAD LEFT Once 02/28/2021   Hemoglobin A1C Once 06/01/2021   Protein / Creatinine Ratio, Urine Once 06/01/2021   Sleep Study with PAP Titration Once 02/19/2021         Patient Active Problem List:     Class 2 severe obesity due to excess calories with serious

## 2021-04-08 RX ORDER — HYDROGEN PEROXIDE 2.65 ML/100ML
LIQUID ORAL; TOPICAL
Qty: 90 TABLET | Refills: 0 | Status: SHIPPED | OUTPATIENT
Start: 2021-04-08 | End: 2021-07-19

## 2021-04-08 RX ORDER — ATORVASTATIN CALCIUM 10 MG/1
TABLET, FILM COATED ORAL
Qty: 30 TABLET | Refills: 0 | Status: SHIPPED | OUTPATIENT
Start: 2021-04-08 | End: 2021-05-06

## 2021-04-08 NOTE — TELEPHONE ENCOUNTER
Request for asa and lipitor. Next Visit Date:  No future appointments. Health Maintenance   Topic Date Due    COVID-19 Vaccine (1) Never done    Diabetic retinal exam  04/14/2017    Diabetic microalbuminuria test  09/25/2019    Diabetic foot exam  07/18/2020    A1C test (Diabetic or Prediabetic)  10/23/2020    Hepatitis B vaccine (1 of 3 - Risk 3-dose series) 06/26/2021 (Originally 6/28/1984)    Shingles Vaccine (1 of 2) 06/26/2021 (Originally 6/28/2017)    Breast cancer screen  08/17/2021    Flu vaccine (Season Ended) 09/01/2021    Cervical cancer screen  10/04/2021    Lipid screen  12/02/2021    Potassium monitoring  12/02/2021    Creatinine monitoring  12/02/2021    Colon cancer screen fecal DNA test (Cologuard)  08/19/2023    DTaP/Tdap/Td vaccine (2 - Td) 11/30/2027    Pneumococcal 0-64 years Vaccine  Completed    Hepatitis C screen  Completed    HIV screen  Completed    Hepatitis A vaccine  Aged Out    Hib vaccine  Aged Out    Meningococcal (ACWY) vaccine  Aged Out       Hemoglobin A1C (%)   Date Value   07/23/2020 9.7 (H)   06/26/2020 9.8   03/12/2019 7.6             ( goal A1C is < 7)   Microalb/Crt.  Ratio (mcg/mg creat)   Date Value   09/25/2018 12     LDL Cholesterol (mg/dL)   Date Value   12/02/2020            (goal LDL is <100)   AST (U/L)   Date Value   12/02/2020 22     ALT (U/L)   Date Value   12/02/2020 25     BUN (mg/dL)   Date Value   12/02/2020 11     BP Readings from Last 3 Encounters:   07/27/20 129/82   07/23/20 (!) 154/88   06/26/20 126/81          (goal 120/80)    All Future Testing planned in [facility]  Lab Frequency Next Occurrence   Full PFT Study With Bronchodilator Once 05/20/2020   COVID-19 Ambulatory Once 07/23/2021   ISAAC DIGITAL DIAGNOSTIC W OR WO CAD LEFT Once 06/15/2021   Hemoglobin A1C Once 06/01/2021   Protein / Creatinine Ratio, Urine Once 06/01/2021   Sleep Study with PAP Titration Once 02/19/2021         Patient Active Problem List:     Class 2 severe obesity due to excess calories with serious comorbidity and body mass index (BMI) of 38.0 to 38.9 in adult [facility])     Hyperlipidemia     Anxiety     Mucoid cyst of joint     Post-traumatic osteoarthritis of left hand     Essential hypertension     Fibromyalgia     Type 2 diabetes mellitus without complication, without long-term current use of insulin ([facility])     Vitamin D deficiency     CHARLES (obstructive sleep apnea)     Abnormal mammogram of left breast. (8/2020)

## 2021-04-27 ENCOUNTER — OFFICE VISIT (OUTPATIENT)
Dept: INTERNAL MEDICINE | Age: 56
End: 2021-04-27
Payer: COMMERCIAL

## 2021-04-27 ENCOUNTER — HOSPITAL ENCOUNTER (OUTPATIENT)
Age: 56
Setting detail: SPECIMEN
Discharge: HOME | End: 2021-04-27
Payer: COMMERCIAL

## 2021-04-27 VITALS
DIASTOLIC BLOOD PRESSURE: 83 MMHG | HEIGHT: 63 IN | SYSTOLIC BLOOD PRESSURE: 138 MMHG | WEIGHT: 238 LBS | BODY MASS INDEX: 42.17 KG/M2 | HEART RATE: 91 BPM

## 2021-04-27 DIAGNOSIS — H61.22 IMPACTED CERUMEN OF LEFT EAR: ICD-10-CM

## 2021-04-27 DIAGNOSIS — E11.9 TYPE 2 DIABETES MELLITUS WITHOUT COMPLICATION, WITHOUT LONG-TERM CURRENT USE OF INSULIN (HCC): Primary | ICD-10-CM

## 2021-04-27 DIAGNOSIS — E66.01 CLASS 2 SEVERE OBESITY DUE TO EXCESS CALORIES WITH SERIOUS COMORBIDITY AND BODY MASS INDEX (BMI) OF 38.0 TO 38.9 IN ADULT (HCC): ICD-10-CM

## 2021-04-27 DIAGNOSIS — I10 ESSENTIAL HYPERTENSION: ICD-10-CM

## 2021-04-27 DIAGNOSIS — E11.9 TYPE 2 DIABETES MELLITUS WITHOUT COMPLICATION, WITHOUT LONG-TERM CURRENT USE OF INSULIN (HCC): ICD-10-CM

## 2021-04-27 LAB
CREATININE URINE: 135.1 MG/DL (ref 28–217)
ESTIMATED AVERAGE GLUCOSE: 260 MG/DL
HBA1C MFR BLD: 10.7 % (ref 4–6)
TOTAL PROTEIN, URINE: 60 MG/DL
URINE TOTAL PROTEIN CREATININE RATIO: 0.44 (ref 0–0.2)

## 2021-04-27 PROCEDURE — 3017F COLORECTAL CA SCREEN DOC REV: CPT | Performed by: STUDENT IN AN ORGANIZED HEALTH CARE EDUCATION/TRAINING PROGRAM

## 2021-04-27 PROCEDURE — 2022F DILAT RTA XM EVC RTNOPTHY: CPT | Performed by: STUDENT IN AN ORGANIZED HEALTH CARE EDUCATION/TRAINING PROGRAM

## 2021-04-27 PROCEDURE — G8417 CALC BMI ABV UP PARAM F/U: HCPCS | Performed by: STUDENT IN AN ORGANIZED HEALTH CARE EDUCATION/TRAINING PROGRAM

## 2021-04-27 PROCEDURE — 99213 OFFICE O/P EST LOW 20 MIN: CPT | Performed by: STUDENT IN AN ORGANIZED HEALTH CARE EDUCATION/TRAINING PROGRAM

## 2021-04-27 PROCEDURE — 3046F HEMOGLOBIN A1C LEVEL >9.0%: CPT | Performed by: STUDENT IN AN ORGANIZED HEALTH CARE EDUCATION/TRAINING PROGRAM

## 2021-04-27 PROCEDURE — 1036F TOBACCO NON-USER: CPT | Performed by: STUDENT IN AN ORGANIZED HEALTH CARE EDUCATION/TRAINING PROGRAM

## 2021-04-27 PROCEDURE — G8427 DOCREV CUR MEDS BY ELIG CLIN: HCPCS | Performed by: STUDENT IN AN ORGANIZED HEALTH CARE EDUCATION/TRAINING PROGRAM

## 2021-04-27 RX ORDER — TRAMADOL HYDROCHLORIDE 50 MG/1
TABLET ORAL
COMMUNITY
Start: 2021-04-26

## 2021-04-27 NOTE — PROGRESS NOTES
Attending Physician Statement  I have discussed the care of Kathryn Roberts including pertinent history and exam findings,  with the resident. I have reviewed the key elements of all parts of the encounter with the resident. I agree with the assessment, plan and orders as documented by the resident.   (GE Modifier)    MD HITESH Johnson  Attending Physician, [address] [place] [address], Internal Medicine Residency Program  [address] [place] [address]  4/27/2021, 3:57 PM

## 2021-04-27 NOTE — PATIENT INSTRUCTIONS
Medications e-scribe to pharmacy of pt's choice. Referral to Otolaryngology was placed, summary of care printed and faxed to office, phone numbers mailed to the patient, they will contact office for an appt    Return To Clinic 6/15/2021. After Visit Summary  given and reviewed. --    It is very important for your care that you keep your appointment. If for some reason you are unable to keep your appointment it is equally important that you call our office at [phone] to cancel your appointment and reschedule. Failure to do so may result in your termination from our practice.

## 2021-04-27 NOTE — PROGRESS NOTES
DAILY (Patient not taking: Reported on 4/27/2021) 100 strip 11    Lancets MISC USE 1 LANCET TO CHECK GLUCOSE TWICE DAILY (Patient not taking: Reported on 4/27/2021) 100 each 11     Current Facility-Administered Medications on File Prior to Visit   Medication Dose Route Frequency Provider Last Rate Last Admin    cyanocobalamin injection 1,000 mcg  1,000 mcg Intramuscular Once Belle Dee MD         SOCIAL HISTORY    Reviewed and no change from previous record. Inna Mason  reports that she quit smoking about 4 years ago. Her smoking use included cigarettes. She has a 25.00 pack-year smoking history. She has never used smokeless tobacco.    FAMILY HISTORY:    Reviewed and No change from previous visit    REVIEW OF SYSTEMS:    ENT: negative  Respiratory: no cough, shortness of breath, or wheezing  Cardiovascular: no chest pain or dyspnea on exertion  Gastrointestinal: no abdominal pain, black or bloody stools  Neurological: no TIA or stroke symptoms  Endocrine: negative  Genito-Urinary: no dysuria, trouble voiding, or hematuria  Musculoskeletal: negative  Dermatological: negative    PHYSICAL EXAM:      Vitals:    04/27/21 1532   BP: 138/83   Pulse: 91     Physical Exam  HENT:      Head: Normocephalic. Ears:      Comments: Rt ear- abrasion noted, however no tenderness    Left ear- impacted cerumen noted in the left ear. Deep location. Mouth/Throat:      Mouth: Mucous membranes are moist.   Eyes:      Pupils: Pupils are equal, round, and reactive to light. Cardiovascular:      Rate and Rhythm: Normal rate. Pulmonary:      Effort: Pulmonary effort is normal.   Musculoskeletal: Normal range of motion. Skin:     General: Skin is warm. Neurological:      Mental Status: She is alert.          LABORATORY FINDINGS:    CBC:  Lab Results   Component Value Date    WBC 11.3 12/02/2020    HGB 13.2 12/02/2020     12/02/2020     BMP:    Lab Results   Component Value Date     12/02/2020    K 4.3 12/02/2020     12/02/2020    CO2 21 12/02/2020    BUN 11 12/02/2020    CREATININE 0.81 12/02/2020    GLUCOSE 308 12/02/2020    GLUCOSE 201 02/17/2012     HEMOGLOBIN A1C:   Lab Results   Component Value Date    LABA1C 9.7 07/23/2020     MICROALBUMIN URINE:   Lab Results   Component Value Date    MICROALBUR 30 09/25/2018     FASTING LIPID PANEL:  Lab Results   Component Value Date    CHOL 175 07/23/2020    HDL 34 (L) 12/02/2020    TRIG 267 (H) 07/23/2020     LIVER PROFILE:  Lab Results   Component Value Date    ALT 25 12/02/2020    AST 22 12/02/2020    PROT 6.8 12/02/2020    BILITOT <0.10 12/02/2020    BILIDIR 0.09 02/17/2012    LABALBU 3.9 12/02/2020    LABALBU 4.3 02/17/2012      THYROID FUNCTION:   Lab Results   Component Value Date    TSH 2.46 12/02/2020      URINE ANALYSIS: No results found for: LABURIN  ASSESSMENT AND PLAN:    1. Impacted cerumen of left ear  Patient advised to stop using Q tips    - carbamide peroxide (DEBROX) 6.5 % otic solution; Place 5 drops into both ears 2 times daily  Dispense: 2 Bottle; Refill: 3  - RICK Ochoa MD, Otolaryngology, [facility]    2. Type 2 diabetes mellitus without complication, without long-term current use of insulin (HCC)  Patient non compliant to medications    3. Essential hypertension      4. Class 2 severe obesity due to excess calories with serious comorbidity and body mass index (BMI) of 38.0 to 38.9 in adult ([facility] [address]        FOLLOW UP:       1. Juliette received counseling on the following healthy behaviors: nutrition and exercise  2. Patient given educational materials - see patient instructions  3. Discussed use, benefit, and side effects of prescribed medications. Barriers to medication compliance addressed. All patient questions answered. Pt voiced understanding. 4.  Reviewed prior labs and health maintenance  5. Continue current medications, diet and exercise.     Zulema Ahumada MD  INTERNAL MEDICINE RESIDENT, PGY-3  [facility] [facility]

## 2021-05-06 LAB
AVERAGE GLUCOSE: NORMAL
HBA1C MFR BLD: 11 %

## 2021-05-06 RX ORDER — ATORVASTATIN CALCIUM 10 MG/1
TABLET, FILM COATED ORAL
Qty: 30 TABLET | Refills: 0 | Status: SHIPPED | OUTPATIENT
Start: 2021-05-06 | End: 2021-06-08

## 2021-05-06 NOTE — TELEPHONE ENCOUNTER
Request for Lipitor. Next Visit Date:  Future Appointments   Date Time Provider Nathalie Alonzoi   6/15/2021  2:30 PM Bboo Yun MD [facility] Maintenance   Topic Date Due    COVID-19 Vaccine (1) Never done    Diabetic retinal exam  04/14/2017    Diabetic microalbuminuria test  09/25/2019    Diabetic foot exam  07/18/2020    Hepatitis B vaccine (1 of 3 - Risk 3-dose series) 06/26/2021 (Originally 6/28/1984)    Shingles Vaccine (1 of 2) 06/26/2021 (Originally 6/28/2017)    A1C test (Diabetic or Prediabetic)  07/27/2021    Breast cancer screen  08/17/2021    Flu vaccine (Season Ended) 09/01/2021    Cervical cancer screen  10/04/2021    Lipid screen  12/02/2021    Potassium monitoring  12/02/2021    Creatinine monitoring  12/02/2021    Colon cancer screen fecal DNA test (Cologuard)  08/19/2023    DTaP/Tdap/Td vaccine (2 - Td) 11/30/2027    Pneumococcal 0-64 years Vaccine  Completed    Hepatitis C screen  Completed    HIV screen  Completed    Hepatitis A vaccine  Aged Out    Hib vaccine  Aged Out    Meningococcal (ACWY) vaccine  Aged Out       Hemoglobin A1C (%)   Date Value   04/27/2021 10.7 (H)   07/23/2020 9.7 (H)   06/26/2020 9.8             ( goal A1C is < 7)   Microalb/Crt.  Ratio (mcg/mg creat)   Date Value   09/25/2018 12     LDL Cholesterol (mg/dL)   Date Value   12/02/2020            (goal LDL is <100)   AST (U/L)   Date Value   12/02/2020 22     ALT (U/L)   Date Value   12/02/2020 25     BUN (mg/dL)   Date Value   12/02/2020 11     BP Readings from Last 3 Encounters:   04/27/21 138/83   07/27/20 129/82   07/23/20 (!) 154/88          (goal 120/80)    All Future Testing planned in [facility]  Lab Frequency Next Occurrence   Full PFT Study With Bronchodilator Once 05/20/2020   COVID-19 Ambulatory Once 07/23/2021   ISAAC DIGITAL DIAGNOSTIC W OR WO CAD LEFT Once 06/15/2021   Sleep Study with PAP Titration Once 02/19/2021         Patient Active Problem List:     Class 2 severe obesity due to excess calories with serious comorbidity and body mass index (BMI) of 38.0 to 38.9 in adult [facility])     Hyperlipidemia     Anxiety     Mucoid cyst of joint     Post-traumatic osteoarthritis of left hand     Essential hypertension     Fibromyalgia     Type 2 diabetes mellitus without complication, without long-term current use of insulin ([facility])     Vitamin D deficiency     CHARLES (obstructive sleep apnea)     Abnormal mammogram of left breast. (8/2020)

## 2021-05-13 DIAGNOSIS — J30.2 SEASONAL ALLERGIES: ICD-10-CM

## 2021-05-13 RX ORDER — LORATADINE 10 MG/1
10 TABLET ORAL DAILY
Qty: 30 TABLET | Refills: 3 | Status: SHIPPED | OUTPATIENT
Start: 2021-05-13 | End: 2021-10-29 | Stop reason: ALTCHOICE

## 2021-05-13 NOTE — TELEPHONE ENCOUNTER
claritin refill    Pt has future appointment          Next Visit Date:  Future Appointments   Date Time Provider Nathalie Christi   6/15/2021  2:30 PM Rebel Knight MD [address] [facility] Maintenance   Topic Date Due    COVID-19 Vaccine (1) Never done    Diabetic retinal exam  04/14/2017    Diabetic microalbuminuria test  09/25/2019    Diabetic foot exam  07/18/2020    Hepatitis B vaccine (1 of 3 - Risk 3-dose series) 06/26/2021 (Originally 6/28/1986)    Shingles Vaccine (1 of 2) 06/26/2021 (Originally 6/28/2017)    A1C test (Diabetic or Prediabetic)  07/27/2021    Breast cancer screen  08/17/2021    Flu vaccine (Season Ended) 09/01/2021    Cervical cancer screen  10/04/2021    Lipid screen  12/02/2021    Potassium monitoring  12/02/2021    Creatinine monitoring  12/02/2021    Colon cancer screen fecal DNA test (Cologuard)  08/19/2023    DTaP/Tdap/Td vaccine (2 - Td) 11/30/2027    Pneumococcal 0-64 years Vaccine  Completed    Hepatitis C screen  Completed    HIV screen  Completed    Hepatitis A vaccine  Aged Out    Hib vaccine  Aged Out    Meningococcal (ACWY) vaccine  Aged Out       Hemoglobin A1C (%)   Date Value   04/27/2021 10.7 (H)   07/23/2020 9.7 (H)   06/26/2020 9.8             ( goal A1C is < 7)   Microalb/Crt.  Ratio (mcg/mg creat)   Date Value   09/25/2018 12     LDL Cholesterol (mg/dL)   Date Value   12/02/2020 07/23/2020 88       (goal LDL is <100)   AST (U/L)   Date Value   12/02/2020 22     ALT (U/L)   Date Value   12/02/2020 25     BUN (mg/dL)   Date Value   12/02/2020 11     BP Readings from Last 3 Encounters:   04/27/21 138/83   07/27/20 129/82   07/23/20 (!) 154/88          (goal 120/80)    All Future Testing planned in [facility]  Lab Frequency Next Occurrence   Full PFT Study With Bronchodilator Once 05/20/2020   COVID-19 Ambulatory Once 07/23/2021   ISAAC DIGITAL DIAGNOSTIC W OR WO CAD LEFT Once 06/15/2021   Sleep Study with PAP Titration Once 02/19/2021 Patient Active Problem List:     Class 2 severe obesity due to excess calories with serious comorbidity and body mass index (BMI) of 38.0 to 38.9 in adult [facility])     Hyperlipidemia     Anxiety     Mucoid cyst of joint     Post-traumatic osteoarthritis of left hand     Essential hypertension     Fibromyalgia     Type 2 diabetes mellitus without complication, without long-term current use of insulin ([facility])     Vitamin D deficiency     CHARLES (obstructive sleep apnea)     Abnormal mammogram of left breast. (8/2020)

## 2021-06-07 NOTE — TELEPHONE ENCOUNTER
severe obesity due to excess calories with serious comorbidity and body mass index (BMI) of 38.0 to 38.9 in adult [facility])     Hyperlipidemia     Anxiety     Mucoid cyst of joint     Post-traumatic osteoarthritis of left hand     Essential hypertension     Fibromyalgia     Type 2 diabetes mellitus without complication, without long-term current use of insulin ([facility])     Vitamin D deficiency     CHARLES (obstructive sleep apnea)     Abnormal mammogram of left breast. (8/2020)

## 2021-06-08 RX ORDER — ATORVASTATIN CALCIUM 10 MG/1
TABLET, FILM COATED ORAL
Qty: 30 TABLET | Refills: 0 | Status: SHIPPED | OUTPATIENT
Start: 2021-06-08 | End: 2021-07-12

## 2021-06-17 ENCOUNTER — OFFICE VISIT (OUTPATIENT)
Dept: INTERNAL MEDICINE | Age: 56
End: 2021-06-17
Payer: COMMERCIAL

## 2021-06-17 VITALS
DIASTOLIC BLOOD PRESSURE: 84 MMHG | BODY MASS INDEX: 41.64 KG/M2 | HEIGHT: 63 IN | SYSTOLIC BLOOD PRESSURE: 127 MMHG | WEIGHT: 235 LBS | HEART RATE: 87 BPM

## 2021-06-17 DIAGNOSIS — E11.9 TYPE 2 DIABETES MELLITUS WITHOUT COMPLICATION, WITHOUT LONG-TERM CURRENT USE OF INSULIN (HCC): ICD-10-CM

## 2021-06-17 DIAGNOSIS — I10 ESSENTIAL HYPERTENSION: ICD-10-CM

## 2021-06-17 DIAGNOSIS — I87.2 VENOUS INSUFFICIENCY OF BOTH LOWER EXTREMITIES: ICD-10-CM

## 2021-06-17 DIAGNOSIS — Z00.00 ANNUAL PHYSICAL EXAM: Primary | ICD-10-CM

## 2021-06-17 PROCEDURE — 99396 PREV VISIT EST AGE 40-64: CPT | Performed by: INTERNAL MEDICINE

## 2021-06-17 PROCEDURE — 99211 OFF/OP EST MAY X REQ PHY/QHP: CPT | Performed by: INTERNAL MEDICINE

## 2021-06-17 RX ORDER — METHOCARBAMOL 750 MG/1
1 TABLET ORAL WEEKLY
COMMUNITY
Start: 2021-06-11 | End: 2021-10-29

## 2021-06-17 ASSESSMENT — ENCOUNTER SYMPTOMS
COUGH: 0
WHEEZING: 0
BLOOD IN STOOL: 0
SHORTNESS OF BREATH: 0
PHOTOPHOBIA: 0
CONSTIPATION: 0
ABDOMINAL PAIN: 0
BACK PAIN: 0

## 2021-06-17 NOTE — PROGRESS NOTES
[facility]    Progress Note    Date of patient's visit: 6/17/2021    Patient's Name:  Zandra Plasencia    YOB: 1965            Patient Care Team:  Kira Dueñas MD as PCP - General (Internal Medicine)  Kira Dueñas MD as PCP - [facility] Empaneled Provider  Joanne Ross MD as Referring Physician (Internal Medicine)    REASON FOR VISIT: Routine outpatient follow     Chief Complaint   Patient presents with    Diabetes     4 month f/u    Discuss Medications     discuss adipex and adderall    Health Maintenance     pt would like foot exam, pt had eye exam done at [address] [facility]- records requested         HISTORY OF PRESENT ILLNESS:    History was obtained from the patient. Zandra Plasencia is a 50 y.o. is here for annual physical.  She is seeing several specialist now and another doctor. She sees endocrinology. She was started on some new medication in addition to Metformin and she is following up with me next month. Her A1c last was high at 10. She never got the compression socks. She has venous insufficiency and varicose veins along with leg swelling. She needs them custom-made. She is requesting another stockings. She was given Adipex for weight loss last time but never came back for follow-up. She also states she wants Adderall but we have told her she cannot get her Adderall and that she needs to get her sleep study done. She has not done that yet. Blood pressure is controlled. She had a Cologuard testing. She says she would like a colonoscopy next time but would like to wait till next year. She is refusing Covid vaccinations. She is scheduled for a mammogram.  She never followed up with the 6-month diagnostic left breast mammogram.  She says she had a previous biopsy before and she does not want any other biopsies. She is also scheduled for a Pap smear next month. No other acute concerns today.   She states she has seen her ophthalmologist at [facility].         Past Medical History:   Diagnosis Date    Anxiety 2/23/2017    Chronic back pain     COPD (chronic obstructive pulmonary disease) (HCC)     Fibromyalgia 3/12/2019    GERD (gastroesophageal reflux disease)     resolved not taking medds    HSV-2 (herpes simplex virus 2) infection 7/2/2013    Hyperlipidemia     Hypertension     Neuropathy     feet    Obesity     CHARLES (obstructive sleep apnea) 2/19/2021    Osteoarthritis     Type II or unspecified type diabetes mellitus without mention of complication, not stated as uncontrolled     Uncontrolled type 2 diabetes mellitus without complication, without long-term current use of insulin 5/22/2019    Urinary incontinence        Past Surgical History:   Procedure Laterality Date    TONSILLECTOMY AND ADENOIDECTOMY      TUBAL LIGATION           ALLERGIES      Allergies   Allergen Reactions    Latex Hives    Lisinopril      Cough      Vicodin [Hydrocodone-Acetaminophen] Hives and Itching    Zocor [Simvastatin]        MEDICATIONS:      Current Outpatient Medications on File Prior to Visit   Medication Sig Dispense Refill    atorvastatin (LIPITOR) 10 MG tablet Take 1 tablet by mouth once daily 30 tablet 0    loratadine (CLARITIN) 10 MG tablet Take 1 tablet by mouth daily 30 tablet 3    traMADol (ULTRAM) 50 MG tablet       EQ ASPIRIN ADULT LOW DOSE 81 MG EC tablet Take 1 tablet by mouth once daily 90 tablet 0    tiZANidine (ZANAFLEX) 4 MG tablet Take 1 tablet by mouth 2 times daily 60 tablet 0    pregabalin (LYRICA) 200 MG capsule TAKE 1 TABLET BY MOUTH NIGHTLY FOR 30 DAYS (Patient taking differently: 200 mg 2 times daily. ) 30 capsule 1    albuterol sulfate HFA (PROVENTIL HFA) 108 (90 Base) MCG/ACT inhaler Inhale 2 puffs into the lungs every 6 hours as needed for Wheezing 1 Inhaler 3    glimepiride (AMARYL) 2 MG tablet Take 1 tablet by mouth every morning 90 tablet 1    metFORMIN (GLUCOPHAGE) 1000 MG tablet TAKE 1 TABLET BY MOUTH cough, shortness of breath and wheezing. Cardiovascular: Positive for leg swelling. Negative for chest pain and palpitations. Gastrointestinal: Negative for abdominal pain, blood in stool and constipation. Endocrine: Negative for polydipsia and polyuria. Musculoskeletal: Positive for arthralgias. Negative for back pain and joint swelling. Neurological: Positive for numbness. Negative for dizziness, syncope, weakness and headaches. Hematological: Negative for adenopathy. Does not bruise/bleed easily. Psychiatric/Behavioral: Positive for decreased concentration. Negative for dysphoric mood. The patient is not nervous/anxious. PHYSICAL EXAM:     Vitals:    06/17/21 1545   BP: 127/84   Site: Right Upper Arm   Position: Sitting   Cuff Size: Medium Adult   Pulse: 87   Weight: 235 lb (106.6 kg)   Height: 5' 3\" (1.6 m)     Body mass index is 41.63 kg/m². BP Readings from Last 3 Encounters:   06/17/21 127/84   04/27/21 138/83   07/27/20 129/82        Wt Readings from Last 3 Encounters:   06/17/21 235 lb (106.6 kg)   04/27/21 238 lb (108 kg)   07/27/20 215 lb (97.5 kg)       Physical Exam  Vitals and nursing note reviewed. Constitutional:       Appearance: She is obese. HENT:      Head: Normocephalic and atraumatic. Eyes:      General: No scleral icterus. Extraocular Movements: Extraocular movements intact. Conjunctiva/sclera: Conjunctivae normal.      Pupils: Pupils are equal, round, and reactive to light. Cardiovascular:      Rate and Rhythm: Normal rate and regular rhythm. Pulses: Normal pulses. Heart sounds: No murmur heard. Pulmonary:      Effort: Pulmonary effort is normal.      Breath sounds: Normal breath sounds. No wheezing. Musculoskeletal:      Cervical back: Normal range of motion and neck supple. Right lower leg: Edema (trace edema around ankles) present. Left lower leg: Edema present. Lymphadenopathy:      Cervical: No cervical adenopathy. Skin:     General: Skin is warm and dry. Neurological:      General: No focal deficit present. Mental Status: She is alert and oriented to person, place, and time. Comments: Monofilament exam normal in both feet              LABORATORY FINDINGS:    CBC:  Lab Results   Component Value Date    WBC 11.3 12/02/2020    HGB 13.2 12/02/2020     12/02/2020     BMP:    Lab Results   Component Value Date     12/02/2020    K 4.3 12/02/2020     12/02/2020    CO2 21 12/02/2020    BUN 11 12/02/2020    CREATININE 0.81 12/02/2020    GLUCOSE 308 12/02/2020    GLUCOSE 201 02/17/2012     HEMOGLOBIN A1C:   Lab Results   Component Value Date    LABA1C 10.7 04/27/2021     MICROALBUMIN URINE:   Lab Results   Component Value Date    MICROALBUR 30 09/25/2018     FASTING LIPID PANEL:  Lab Results   Component Value Date    CHOL 175 07/23/2020    HDL 34 (L) 12/02/2020    TRIG 267 (H) 07/23/2020     Lab Results   Component Value Date    LDLCHOLESTEROL      12/02/2020    LDLDIRECT 84 12/02/2020       LIVER PROFILE:  Lab Results   Component Value Date    ALT 25 12/02/2020    AST 22 12/02/2020    PROT 6.8 12/02/2020    BILITOT <0.10 12/02/2020    BILIDIR 0.09 02/17/2012    LABALBU 3.9 12/02/2020    LABALBU 4.3 02/17/2012      THYROID FUNCTION:   Lab Results   Component Value Date    TSH 2.46 12/02/2020      URINEANALYSIS: No results found for: LABURIN  ASSESSMENT AND PLAN:    1. Annual physical exam  Get mammogram, pap etc done  Immunizations     2. Essential hypertension  Controlled     3. Venous insufficiency of both lower extremities    - Elastic Bandages & Supports (JOBST KNEE HIGH COMPRESSION SM) MISC; 2 pairs knee high bilateral lower extremities  Dispense: 1 each; Refill: 0    4.  Type 2 diabetes mellitus without complication, without long-term current use of insulin ([address]  Follows up with endocrinology    -  DIABETES FOOT EXAM          FOLLOW UP AND INSTRUCTIONS:   Return in about 6 months (around 12/17/2021). 1. Merary Whitmore received counseling on the following healthy behaviors: nutrition, exercise and medication adherence    2. Reviewed prior labs and health maintenance. 3. Discussed use, benefit, and side effects of prescribed medications. Barriers to medication compliance addressed. All patient questions answered. Pt voiced understanding.      4. Patient given educational materials - see patient instructions    Albert Snyder  Attending Physician, [address] [place] [address], Internal Medicine Residency Program  [address] [place] [address]  6/17/2021, 4:12 PM

## 2021-06-17 NOTE — PATIENT INSTRUCTIONS
Patient was given a printed script for [employer] along with a printed list of places that the script may be filled at. Printed script for Compression stocking signed and mailed to the patient. Faxed to [facility], [facility] on [address] [facility]    Patient was put on a wait list and will be contacted to schedule their next follow up appointment once the schedule is available. If the patient is in need of an appointment before their next visit please call the office at [phone]. After Visit Summary  mailed to patient.     SL    Notes requested from Dr. Evert Simental, Dr. Romayne Kitchens

## 2021-07-12 NOTE — TELEPHONE ENCOUNTER
Request for Liptor. Pt on wait list for 6 month in December    Next Visit Date:  Future Appointments   Date Time Provider Nathalie Barraza   8/17/2021  2:45 PM Fabio Sanford DO [facility] [address] Maintenance   Topic Date Due    COVID-19 Vaccine (1) Never done    Hepatitis B vaccine (1 of 3 - Risk 3-dose series) Never done    Shingles Vaccine (1 of 2) Never done    Low dose CT lung screening  Never done    Diabetic microalbuminuria test  09/25/2019    A1C test (Diabetic or Prediabetic)  07/27/2021    Breast cancer screen  08/17/2021    Flu vaccine (1) 09/01/2021    Cervical cancer screen  10/04/2021    Lipid screen  12/02/2021    Potassium monitoring  12/02/2021    Creatinine monitoring  12/02/2021    Diabetic retinal exam  04/13/2022    Diabetic foot exam  06/17/2022    Colon cancer screen fecal DNA test (Cologuard)  08/19/2023    DTaP/Tdap/Td vaccine (2 - Td or Tdap) 11/30/2027    Pneumococcal 0-64 years Vaccine (2 of 2 - PPSV23) 06/28/2032    Hepatitis C screen  Completed    HIV screen  Completed    Hepatitis A vaccine  Aged Out    Hib vaccine  Aged Out    Meningococcal (ACWY) vaccine  Aged Out       Hemoglobin A1C (%)   Date Value   04/27/2021 10.7 (H)   07/23/2020 9.7 (H)   06/26/2020 9.8             ( goal A1C is < 7)   Microalb/Crt.  Ratio (mcg/mg creat)   Date Value   09/25/2018 12     LDL Cholesterol (mg/dL)   Date Value   12/02/2020            (goal LDL is <100)   AST (U/L)   Date Value   12/02/2020 22     ALT (U/L)   Date Value   12/02/2020 25     BUN (mg/dL)   Date Value   12/02/2020 11     BP Readings from Last 3 Encounters:   06/17/21 127/84   04/27/21 138/83   07/27/20 129/82          (goal 120/80)    All Future Testing planned in [facility]  Lab Frequency Next Occurrence   COVID-19 Ambulatory Once 07/23/2021   ISAAC DIGITAL DIAGNOSTIC W OR WO CAD LEFT Once 08/28/2021   Sleep Study with PAP Titration Once 02/19/2021         Patient Active Problem List:     Class 2 severe obesity due to excess calories with serious comorbidity and body mass index (BMI) of 38.0 to 38.9 in adult [facility])     Hyperlipidemia     Anxiety     Mucoid cyst of joint     Post-traumatic osteoarthritis of left hand     Essential hypertension     Fibromyalgia     Type 2 diabetes mellitus without complication, without long-term current use of insulin ([facility])     Vitamin D deficiency     CHARLES (obstructive sleep apnea)     Abnormal mammogram of left breast. (8/2020)

## 2021-07-12 NOTE — TELEPHONE ENCOUNTER
Request for Metformin. Pt on wait list for 6 month f/u in December    Next Visit Date:  Future Appointments   Date Time Provider Nathalie Barraza   8/17/2021  2:45 PM Daphne Rome DO [facility] [address] Maintenance   Topic Date Due    COVID-19 Vaccine (1) Never done    Hepatitis B vaccine (1 of 3 - Risk 3-dose series) Never done    Shingles Vaccine (1 of 2) Never done    Low dose CT lung screening  Never done    Diabetic microalbuminuria test  09/25/2019    A1C test (Diabetic or Prediabetic)  07/27/2021    Breast cancer screen  08/17/2021    Flu vaccine (1) 09/01/2021    Cervical cancer screen  10/04/2021    Lipid screen  12/02/2021    Potassium monitoring  12/02/2021    Creatinine monitoring  12/02/2021    Diabetic retinal exam  04/13/2022    Diabetic foot exam  06/17/2022    Colon cancer screen fecal DNA test (Cologuard)  08/19/2023    DTaP/Tdap/Td vaccine (2 - Td or Tdap) 11/30/2027    Pneumococcal 0-64 years Vaccine (2 of 2 - PPSV23) 06/28/2032    Hepatitis C screen  Completed    HIV screen  Completed    Hepatitis A vaccine  Aged Out    Hib vaccine  Aged Out    Meningococcal (ACWY) vaccine  Aged Out       Hemoglobin A1C (%)   Date Value   04/27/2021 10.7 (H)   07/23/2020 9.7 (H)   06/26/2020 9.8             ( goal A1C is < 7)   Microalb/Crt.  Ratio (mcg/mg creat)   Date Value   09/25/2018 12     LDL Cholesterol (mg/dL)   Date Value   12/02/2020            (goal LDL is <100)   AST (U/L)   Date Value   12/02/2020 22     ALT (U/L)   Date Value   12/02/2020 25     BUN (mg/dL)   Date Value   12/02/2020 11     BP Readings from Last 3 Encounters:   06/17/21 127/84   04/27/21 138/83   07/27/20 129/82          (goal 120/80)    All Future Testing planned in [facility]  Lab Frequency Next Occurrence   COVID-19 Ambulatory Once 07/23/2021   ISAAC DIGITAL DIAGNOSTIC W OR WO CAD LEFT Once 08/28/2021   Sleep Study with PAP Titration Once 02/19/2021         Patient Active Problem List: Class 2 severe obesity due to excess calories with serious comorbidity and body mass index (BMI) of 38.0 to 38.9 in adult [facility])     Hyperlipidemia     Anxiety     Mucoid cyst of joint     Post-traumatic osteoarthritis of left hand     Essential hypertension     Fibromyalgia     Type 2 diabetes mellitus without complication, without long-term current use of insulin ([facility])     Vitamin D deficiency     CHARLES (obstructive sleep apnea)     Abnormal mammogram of left breast. (8/2020)

## 2021-07-13 RX ORDER — ATORVASTATIN CALCIUM 10 MG/1
TABLET, FILM COATED ORAL
Qty: 30 TABLET | Refills: 5 | Status: SHIPPED | OUTPATIENT
Start: 2021-07-13 | End: 2021-10-29 | Stop reason: SDUPTHER

## 2021-07-16 ENCOUNTER — HOSPITAL ENCOUNTER (OUTPATIENT)
Age: 56
Setting detail: SPECIMEN
Discharge: HOME | End: 2021-07-16
Payer: COMMERCIAL

## 2021-07-16 ENCOUNTER — OFFICE VISIT (OUTPATIENT)
Dept: INTERNAL MEDICINE | Age: 56
End: 2021-07-16
Payer: COMMERCIAL

## 2021-07-16 VITALS
HEIGHT: 63 IN | WEIGHT: 232 LBS | SYSTOLIC BLOOD PRESSURE: 124 MMHG | BODY MASS INDEX: 41.11 KG/M2 | TEMPERATURE: 97.3 F | HEART RATE: 86 BPM | DIASTOLIC BLOOD PRESSURE: 88 MMHG

## 2021-07-16 DIAGNOSIS — N30.00 ACUTE CYSTITIS WITHOUT HEMATURIA: ICD-10-CM

## 2021-07-16 DIAGNOSIS — F17.210 CIGARETTE NICOTINE DEPENDENCE WITHOUT COMPLICATION: ICD-10-CM

## 2021-07-16 DIAGNOSIS — E66.01 MORBID OBESITY WITH BMI OF 40.0-44.9, ADULT (HCC): ICD-10-CM

## 2021-07-16 DIAGNOSIS — I10 ESSENTIAL HYPERTENSION: Primary | ICD-10-CM

## 2021-07-16 LAB
-: ABNORMAL
AMORPHOUS: ABNORMAL
BACTERIA: ABNORMAL
BILIRUBIN URINE: NEGATIVE
BILIRUBIN, POC: NEGATIVE
BLOOD URINE, POC: POSITIVE
CASTS UA: ABNORMAL /LPF (ref 0–8)
CLARITY, POC: ABNORMAL
COLOR, POC: YELLOW
COLOR: YELLOW
COMMENT UA: ABNORMAL
CRYSTALS, UA: ABNORMAL /HPF
EPITHELIAL CELLS UA: ABNORMAL /HPF (ref 0–5)
GLUCOSE URINE, POC: NEGATIVE
GLUCOSE URINE: NEGATIVE
KETONES, POC: NEGATIVE
KETONES, URINE: NEGATIVE
LEUKOCYTE EST, POC: POSITIVE
LEUKOCYTE ESTERASE, URINE: ABNORMAL
MUCUS: ABNORMAL
NITRITE, POC: POSITIVE
NITRITE, URINE: POSITIVE
OTHER OBSERVATIONS UA: ABNORMAL
PH UA: 5 (ref 5–8)
PH, POC: 6
PROTEIN UA: ABNORMAL
PROTEIN, POC: POSITIVE
RBC UA: ABNORMAL /HPF (ref 0–4)
RENAL EPITHELIAL, UA: ABNORMAL /HPF
SPECIFIC GRAVITY UA: 1.02 (ref 1–1.03)
SPECIFIC GRAVITY, POC: 1.02
TRICHOMONAS: ABNORMAL
TURBIDITY: ABNORMAL
URINE HGB: ABNORMAL
UROBILINOGEN, POC: NEGATIVE
UROBILINOGEN, URINE: NORMAL
WBC UA: ABNORMAL /HPF (ref 0–5)
YEAST: ABNORMAL

## 2021-07-16 PROCEDURE — G8427 DOCREV CUR MEDS BY ELIG CLIN: HCPCS | Performed by: INTERNAL MEDICINE

## 2021-07-16 PROCEDURE — 81003 URINALYSIS AUTO W/O SCOPE: CPT | Performed by: INTERNAL MEDICINE

## 2021-07-16 PROCEDURE — 4004F PT TOBACCO SCREEN RCVD TLK: CPT | Performed by: INTERNAL MEDICINE

## 2021-07-16 PROCEDURE — G8417 CALC BMI ABV UP PARAM F/U: HCPCS | Performed by: INTERNAL MEDICINE

## 2021-07-16 PROCEDURE — 3046F HEMOGLOBIN A1C LEVEL >9.0%: CPT | Performed by: INTERNAL MEDICINE

## 2021-07-16 PROCEDURE — 99211 OFF/OP EST MAY X REQ PHY/QHP: CPT | Performed by: INTERNAL MEDICINE

## 2021-07-16 PROCEDURE — 3017F COLORECTAL CA SCREEN DOC REV: CPT | Performed by: INTERNAL MEDICINE

## 2021-07-16 PROCEDURE — 99214 OFFICE O/P EST MOD 30 MIN: CPT | Performed by: INTERNAL MEDICINE

## 2021-07-16 PROCEDURE — 2022F DILAT RTA XM EVC RTNOPTHY: CPT | Performed by: INTERNAL MEDICINE

## 2021-07-16 RX ORDER — GLIPIZIDE 10 MG/1
1 TABLET ORAL 2 TIMES DAILY
COMMUNITY
Start: 2021-07-13 | End: 2021-10-29

## 2021-07-16 RX ORDER — CEPHALEXIN 500 MG/1
500 CAPSULE ORAL 2 TIMES DAILY
Qty: 14 CAPSULE | Refills: 0 | Status: SHIPPED | OUTPATIENT
Start: 2021-07-16 | End: 2021-07-23

## 2021-07-16 NOTE — PATIENT INSTRUCTIONS
Return To Clinic 10/19/2021 . After Visit Summary  given and reviewed. It is very important for your care that you keep your appointment. If for some reason you are unable to keep your appointment it is equally important that you call our office at [phone] to cancel your appointment and reschedule. Failure to do so may result in your termination from our practice.     -Your doctor has ordered a [id number] for you, please contact our scheduling department at [phone] to set up an appointment to have this completed before your next visit.     -Starting Fresh Referral given to patient    -JAMAL Motley

## 2021-07-16 NOTE — PROGRESS NOTES
[facility]    Progress Note    Date of patient's visit: 7/16/2021    Patient's Name:  Alcides Purdy    YOB: 1965            Patient Care Team:  Vesna Laws MD as PCP - General (Internal Medicine)  Vesna Laws MD as PCP - [facility] Empaneled Provider  Kerry Arriola MD as Referring Physician (Internal Medicine)    REASON FOR VISIT: Routine outpatient follow     Chief Complaint   Patient presents with    Hematuria     x1 week, not noticing the blood in urine anymore, feeling pressure    Sweats     x1 week, pt feels cold and get the sweats    Health Maintenance     pt wants CT lung screening, pt has mammogram scheduled, declined all vaccines         HISTORY OF PRESENT ILLNESS:    History was obtained from the patient. Alcides Purdy is a 49 y.o. is here for complaints of abnormal urine test.  She states she was at her endocrinologist in May. She had labs done which showed normal BUN and creatinine. She says she was told she has some protein and other abnormalities in the urine. Today patient comes in though complaining of 1 week of some suprapubic pressure and possible hematuria. She is not seeing that now. No fever or chills. No flank pain. She has uncontrolled diabetes and her last A1c was 11 at Dr. Shyann Mireles office. She is on oral medications. She does not check her blood sugars. She says she wants a continuous glucose monitor. She says she has been told she might benefit from Trulicity and she is seriously considering getting it. She says she will follow up with Dr. Shyann Mireles office and will probably start that here soon. She continues to smoke. She is down to half pack per day now but has smoked at least 1 pack/day for more than 25 years. She would like a low-dose CAT scan done.   She states she has scheduled her mammogram.  She states she has seen her ophthalmologist and denies any retinopathy but will try to obtain tablet       EQ ASPIRIN ADULT LOW DOSE 81 MG EC tablet Take 1 tablet by mouth once daily 90 tablet 0    tiZANidine (ZANAFLEX) 4 MG tablet Take 1 tablet by mouth 2 times daily 60 tablet 0    pregabalin (LYRICA) 200 MG capsule TAKE 1 TABLET BY MOUTH NIGHTLY FOR 30 DAYS (Patient taking differently: 200 mg 2 times daily. ) 30 capsule 1    albuterol sulfate HFA (PROVENTIL HFA) 108 (90 Base) MCG/ACT inhaler Inhale 2 puffs into the lungs every 6 hours as needed for Wheezing 1 Inhaler 3    glimepiride (AMARYL) 2 MG tablet Take 1 tablet by mouth every morning 90 tablet 1    D3-50 1.25 MG (21893 UT) CAPS Take 1 capsule by mouth once a week (Patient not taking: Reported on 7/16/2021)      Omega 3 1000 MG CAPS Take 1 capsule by mouth daily (Patient not taking: Reported on 6/17/2021) 60 capsule 1    Calcium Carbonate-Vitamin D (OYSTER SHELL CALCIUM/D) 500-200 MG-UNIT TABS Take 1 tablet by mouth daily (Patient not taking: Reported on 4/27/2021) 30 tablet 0    tiotropium (SPIRIVA) 18 MCG inhalation capsule Inhale 1 capsule into the lungs daily (Patient not taking: Reported on 4/27/2021) 30 capsule 3    fluticasone (FLONASE) 50 MCG/ACT nasal spray 1 spray by Each Nostril route daily (Patient not taking: Reported on 4/27/2021) 2 Bottle 1    blood glucose test strips (TRUE METRIX BLOOD GLUCOSE TEST) strip USE 1 STRIP TO CHECK GLUCOSE TWICE DAILY (Patient not taking: Reported on 4/27/2021) 100 strip 11    Lancets MISC USE 1 LANCET TO CHECK GLUCOSE TWICE DAILY (Patient not taking: Reported on 7/16/2021) 100 each 11     Current Facility-Administered Medications on File Prior to Visit   Medication Dose Route Frequency Provider Last Rate Last Admin    cyanocobalamin injection 1,000 mcg  1,000 mcg Intramuscular Once Irish Clark MD           HISTORY    Reviewed and no change from previous record. Te Santiago  reports that she has been smoking cigarettes. She started smoking about 25 years ago.  She has a 12.50 pack-year smoking history. She has never used smokeless tobacco.    FAMILY HISTORY:    Reviewed and No change from previous visit    HEALTH MAINTENANCE DUE:      Health Maintenance Due   Topic Date Due    COVID-19 Vaccine (1) Never done    Hepatitis B vaccine (1 of 3 - Risk 3-dose series) Never done    Shingles Vaccine (1 of 2) Never done    Low dose CT lung screening  Never done    Diabetic microalbuminuria test  09/25/2019       REVIEW OF SYSTEMS:    12 point review of symptoms completed and found to be normal except noted in the HPI    Review of Systems     PHYSICAL EXAM:     Vitals:    07/16/21 0930 07/16/21 0936   BP: (!) 142/89 124/88   Site: Left Upper Arm Left Upper Arm   Position: Sitting Sitting   Cuff Size: Large Adult Large Adult   Pulse: 86    Temp: 97.3 °F (36.3 °C)    TempSrc: Temporal    Weight: 232 lb (105.2 kg)    Height: 5' 3\" (1.6 m)      Body mass index is 41.1 kg/m².     BP Readings from Last 3 Encounters:   07/16/21 124/88   06/17/21 127/84   04/27/21 138/83        Wt Readings from Last 3 Encounters:   07/16/21 232 lb (105.2 kg)   06/17/21 235 lb (106.6 kg)   04/27/21 238 lb (108 kg)       Physical Exam          LABORATORY FINDINGS:    CBC:  Lab Results   Component Value Date    WBC 11.3 12/02/2020    HGB 13.2 12/02/2020     12/02/2020     BMP:    Lab Results   Component Value Date     12/02/2020    K 4.3 12/02/2020     12/02/2020    CO2 21 12/02/2020    BUN 11 12/02/2020    CREATININE 0.81 12/02/2020    GLUCOSE 308 12/02/2020    GLUCOSE 201 02/17/2012     HEMOGLOBIN A1C:   Lab Results   Component Value Date    LABA1C 11.0 05/06/2021     MICROALBUMIN URINE:   Lab Results   Component Value Date    MICROALBUR 30 09/25/2018     FASTING LIPID PANEL:  Lab Results   Component Value Date    CHOL 175 07/23/2020    HDL 34 (L) 12/02/2020    TRIG 267 (H) 07/23/2020     Lab Results   Component Value Date    LDLCHOLESTEROL      12/02/2020    LDLDIRECT 84 12/02/2020       LIVER PROFILE:  Lab Results Component Value Date    ALT 25 12/02/2020    AST 22 12/02/2020    PROT 6.8 12/02/2020    BILITOT <0.10 12/02/2020    BILIDIR 0.09 02/17/2012    LABALBU 3.9 12/02/2020    LABALBU 4.3 02/17/2012      THYROID FUNCTION:   Lab Results   Component Value Date    TSH 2.46 12/02/2020      URINEANALYSIS: No results found for: LABURIN  ASSESSMENT AND PLAN:    1. Essential hypertension  Same meds      2. Uncontrolled diabetes mellitus with diabetic nephropathy, without long-term current use of insulin ([address]  Follow up with endocrinology  Allergic to Lisinopril    - [facility]    3. Acute cystitis without hematuria    - POCT Urinalysis No Micro (Auto)  - Urinalysis Reflex to Culture; Future  - cephALEXin (KEFLEX) 500 MG capsule; Take 1 capsule by mouth 2 times daily for 7 days  Dispense: 14 capsule; Refill: 0    4. Morbid obesity with BMI of 40.0-44.9, adult (HCC)  Diet changes     5. Cigarette nicotine dependence without complication    - CT Lung Screening; Future  - OR VISIT TO DISCUSS LUNG CA SCREEN W LDCT          FOLLOW UP AND INSTRUCTIONS:   Return in about 3 months (around 10/16/2021). 1. Emelia Stephenson received counseling on the following healthy behaviors: nutrition, exercise and medication adherence    2. Reviewed prior labs and health maintenance. 3. Discussed use, benefit, and side effects of prescribed medications. Barriers to medication compliance addressed. All patient questions answered. Pt voiced understanding.          Arielle Latham  Attending Physician, [address] [place] [address], Internal Medicine Residency Program  [address] [place] [address]  7/16/2021, 10:02 AM

## 2021-07-18 LAB
CULTURE: ABNORMAL
Lab: ABNORMAL
SPECIMEN DESCRIPTION: ABNORMAL

## 2021-07-19 NOTE — TELEPHONE ENCOUNTER
Request for Asa . Next Visit Date:  Future Appointments   Date Time Provider Nathalie Barraza   8/16/2021  2:30 PM STA [facility] MAMMO RM 2 STAZ MAMMO STA Radiolog   8/17/2021  2:45 PM Justine Davis DO [facility] OB/Gyn [facility]   10/19/2021  2:30 PM Justin aCse MD [facility] IM [address] Maintenance   Topic Date Due    COVID-19 Vaccine (1) Never done    Breast cancer screen  08/17/2021    Hepatitis B vaccine (1 of 3 - Risk 3-dose series) 10/16/2021 (Originally 6/28/1986)    Shingles Vaccine (1 of 2) 10/16/2021 (Originally 6/28/2017)    A1C test (Diabetic or Prediabetic)  08/06/2021    Flu vaccine (1) 09/01/2021    Cervical cancer screen  10/04/2021    Lipid screen  12/02/2021    Potassium monitoring  12/02/2021    Creatinine monitoring  12/02/2021    Diabetic retinal exam  04/13/2022    Diabetic microalbuminuria test  05/06/2022    Diabetic foot exam  06/17/2022    Colon cancer screen fecal DNA test (Cologuard)  08/19/2023    DTaP/Tdap/Td vaccine (2 - Td or Tdap) 11/30/2027    Pneumococcal 0-64 years Vaccine (2 of 2 - PPSV23) 06/28/2032    Hepatitis C screen  Completed    HIV screen  Completed    Hepatitis A vaccine  Aged Out    Hib vaccine  Aged Out    Meningococcal (ACWY) vaccine  Aged Out       Hemoglobin A1C (%)   Date Value   05/06/2021 11.0   04/27/2021 10.7 (H)   07/23/2020 9.7 (H)             ( goal A1C is < 7)   Microalb/Crt.  Ratio (mcg/mg creat)   Date Value   09/25/2018 12     LDL Cholesterol (mg/dL)   Date Value   12/02/2020            (goal LDL is <100)   AST (U/L)   Date Value   12/02/2020 22     ALT (U/L)   Date Value   12/02/2020 25     BUN (mg/dL)   Date Value   12/02/2020 11     BP Readings from Last 3 Encounters:   07/16/21 124/88   06/17/21 127/84   04/27/21 138/83          (goal 120/80)    All Future Testing planned in [facility]  Lab Frequency Next Occurrence   COVID-19 Ambulatory Once 07/23/2021   ISAAC DIGITAL DIAGNOSTIC W OR WO CAD LEFT Once 08/28/2021   Sleep Study with PAP Titration Once 02/19/2021   CT Lung Screening Once 07/16/2021         Patient Active Problem List:     Class 2 severe obesity due to excess calories with serious comorbidity and body mass index (BMI) of 38.0 to 38.9 in adult [facility])     Hyperlipidemia     Anxiety     Mucoid cyst of joint     Post-traumatic osteoarthritis of left hand     Essential hypertension     Fibromyalgia     Type 2 diabetes mellitus without complication, without long-term current use of insulin (HCC)     Vitamin D deficiency     CHARLES (obstructive sleep apnea)     Abnormal mammogram of left breast. (8/2020)

## 2021-07-20 RX ORDER — HYDROGEN PEROXIDE 2.65 ML/100ML
LIQUID ORAL; TOPICAL
Qty: 90 TABLET | Refills: 1 | Status: SHIPPED | OUTPATIENT
Start: 2021-07-20

## 2021-08-13 ENCOUNTER — TELEPHONE (OUTPATIENT)
Dept: ONCOLOGY | Age: 56
End: 2021-08-13

## 2021-08-13 DIAGNOSIS — F17.200 SMOKER: ICD-10-CM

## 2021-08-13 DIAGNOSIS — R05.9 COUGH: Primary | ICD-10-CM

## 2021-08-16 ENCOUNTER — HOSPITAL ENCOUNTER (OUTPATIENT)
Dept: MAMMOGRAPHY | Age: 56
Discharge: HOME | End: 2021-08-18
Payer: COMMERCIAL

## 2021-08-16 DIAGNOSIS — Z12.31 ENCOUNTER FOR SCREENING MAMMOGRAM FOR BREAST CANCER: ICD-10-CM

## 2021-08-16 PROCEDURE — 77067 SCR MAMMO BI INCL CAD: CPT

## 2021-08-17 ENCOUNTER — OFFICE VISIT (OUTPATIENT)
Dept: OBGYN | Age: 56
End: 2021-08-17
Payer: COMMERCIAL

## 2021-08-17 ENCOUNTER — HOSPITAL ENCOUNTER (OUTPATIENT)
Age: 56
Setting detail: SPECIMEN
Discharge: HOME | End: 2021-08-17
Payer: COMMERCIAL

## 2021-08-17 VITALS
WEIGHT: 236 LBS | SYSTOLIC BLOOD PRESSURE: 147 MMHG | BODY MASS INDEX: 41.82 KG/M2 | HEART RATE: 80 BPM | DIASTOLIC BLOOD PRESSURE: 82 MMHG | HEIGHT: 63 IN

## 2021-08-17 DIAGNOSIS — R39.9 UTI SYMPTOMS: ICD-10-CM

## 2021-08-17 DIAGNOSIS — Z01.419 WELL WOMAN EXAM WITH ROUTINE GYNECOLOGICAL EXAM: Primary | ICD-10-CM

## 2021-08-17 DIAGNOSIS — Z01.419 WELL WOMAN EXAM WITH ROUTINE GYNECOLOGICAL EXAM: ICD-10-CM

## 2021-08-17 LAB
-: NORMAL
AMORPHOUS: NORMAL
BACTERIA: NORMAL
BILIRUBIN URINE: NEGATIVE
CASTS UA: NORMAL /LPF (ref 0–8)
COLOR: YELLOW
COMMENT UA: ABNORMAL
CRYSTALS, UA: NORMAL /HPF
EPITHELIAL CELLS UA: NORMAL /HPF (ref 0–5)
GLUCOSE URINE: ABNORMAL
KETONES, URINE: NEGATIVE
LEUKOCYTE ESTERASE, URINE: NEGATIVE
MUCUS: NORMAL
NITRITE, URINE: NEGATIVE
OTHER OBSERVATIONS UA: NORMAL
PH UA: 5.5 (ref 5–8)
PROTEIN UA: ABNORMAL
RBC UA: NORMAL /HPF (ref 0–4)
RENAL EPITHELIAL, UA: NORMAL /HPF
SPECIFIC GRAVITY UA: 1.02 (ref 1–1.03)
TRICHOMONAS: NORMAL
TURBIDITY: CLEAR
URINE HGB: NEGATIVE
UROBILINOGEN, URINE: NORMAL
WBC UA: NORMAL /HPF (ref 0–5)
YEAST: NORMAL

## 2021-08-17 PROCEDURE — 99396 PREV VISIT EST AGE 40-64: CPT | Performed by: STUDENT IN AN ORGANIZED HEALTH CARE EDUCATION/TRAINING PROGRAM

## 2021-08-17 PROCEDURE — 99211 OFF/OP EST MAY X REQ PHY/QHP: CPT | Performed by: STUDENT IN AN ORGANIZED HEALTH CARE EDUCATION/TRAINING PROGRAM

## 2021-08-17 NOTE — PROGRESS NOTES
negative recent illness, negative recent sick contact  Musculoskeletal: negative back pain, negative myalgias, negative arthralgias  Neurological:  negative dizziness, negative migraines, negative seizures, negative weakness  Behavior/Psych: negative depression, negative anxiety, negative SI, negative HI  ________________________________________________________________________    GYNECOLOGICAL HISTORY:  Age of Menarche: 10  Age of Menopause: 54     Sexually Active: single partner, contraception - postmenopausal  STD History: past history: chlamydia remote, trichomonas remote    Pap History: Last PAP was normal; 2016. Colposcopy History: denies    Permanent Sterilization: yes - tubal ligation  Reversible Birth Control: no  Hormone Replacement Exposure: no    HEALTH MAINTENANCE:  Immunization status: up to date and documented    Date of Last Mammogram: approximate date 23 and was normal  Date of Last Colonoscopy: Cologuard was normal - managed by PCP    OBSTETRICAL HISTORY:  OB History    Para Term  AB Living   4 3 3 0 1 3   SAB TAB Ectopic Molar Multiple Live Births   0 0 0 0 0 0      # Outcome Date GA Lbr Aldo/2nd Weight Sex Delivery Anes PTL Lv   4 Term            3 Term            2 Term            1 AB                PAST MEDICAL HISTORY:   has a past medical history of Anxiety, Chronic back pain, COPD (chronic obstructive pulmonary disease) ([facility] [address] Fibromyalgia, GERD (gastroesophageal reflux disease), HSV-2 (herpes simplex virus 2) infection, Hyperlipidemia, Hypertension, Neuropathy, Obesity, CHARLES (obstructive sleep apnea), Osteoarthritis, Post-traumatic osteoarthritis of left hand, Type II or unspecified type diabetes mellitus without mention of complication, not stated as uncontrolled, Uncontrolled type 2 diabetes mellitus without complication, without long-term current use of insulin, and Urinary incontinence.     PAST SURGICAL HISTORY:   has a past surgical history that includes Tonsillectomy and adenoidectomy and Tubal ligation. ALLERGIES:  is allergic to latex, lisinopril, vicodin [hydrocodone-acetaminophen], and zocor [simvastatin]. MEDICATIONS:  Prior to Admission medications    Medication Sig Start Date End Date Taking? Authorizing Provider   EQ ASPIRIN ADULT LOW DOSE 81 MG EC tablet Take 1 tablet by mouth once daily 7/20/21  Yes Linwood Peng MD   metFORMIN (GLUCOPHAGE) 1000 MG tablet Take 1 tablet by mouth twice daily with food 7/13/21  Yes Linwood Peng MD   atorvastatin (LIPITOR) 10 MG tablet Take 1 tablet by mouth once daily 7/13/21  Yes Linwood Peng MD   D3-50 1.25 MG (13980 UT) CAPS Take 1 capsule by mouth once a week  6/11/21  Yes Historical Provider, MD   traMADol Tricia Thakkar) 50 MG tablet  4/26/21  Yes Historical Provider, MD   tiZANidine (ZANAFLEX) 4 MG tablet Take 1 tablet by mouth 2 times daily 2/17/21  Yes Kt Daugherty MD   pregabalin (LYRICA) 200 MG capsule TAKE 1 TABLET BY MOUTH NIGHTLY FOR 30 DAYS  Patient taking differently: 200 mg 2 times daily.   11/10/20 8/17/21 Yes Linwood Peng MD   albuterol sulfate HFA (PROVENTIL HFA) 108 (90 Base) MCG/ACT inhaler Inhale 2 puffs into the lungs every 6 hours as needed for Wheezing 6/26/20  Yes Linwood Peng MD   glimepiride (AMARYL) 2 MG tablet Take 1 tablet by mouth every morning 6/26/20  Yes Linwood Peng MD   blood glucose test strips (TRUE METRIX BLOOD GLUCOSE TEST) strip USE 1 STRIP TO CHECK GLUCOSE TWICE DAILY 5/20/20  Yes Linwood Peng MD   glipiZIDE (GLUCOTROL) 10 MG tablet Take 1 tablet by mouth 2 times daily  Patient not taking: Reported on 8/17/2021 7/13/21   Harjinder Pierson MD   loratadine (CLARITIN) 10 MG tablet Take 1 tablet by mouth daily  Patient not taking: Reported on 8/17/2021 5/13/21   Linwood Peng MD   Omega 3 1000 MG CAPS Take 1 capsule by mouth daily  Patient not taking: Reported on 6/17/2021 2/19/21   Kt Daugherty MD   Calcium Carbonate-Vitamin D (OYSTER SHELL CALCIUM/D) 500-200 MG-UNIT TABS Take 1 tablet by mouth daily  Patient not taking: Reported on 4/27/2021 2/19/21 2/19/22  Reina Shen MD   tiotropium (SPIRIVA) 18 MCG inhalation capsule Inhale 1 capsule into the lungs daily  Patient not taking: Reported on 4/27/2021 6/26/20   Anderson Benson MD   fluticasone [facility]) 50 MCG/ACT nasal spray 1 spray by Each Nostril route daily  Patient not taking: Reported on 4/27/2021 6/26/20   Anderson Benson MD   Lancets MISC USE 1 LANCET TO CHECK GLUCOSE TWICE DAILY  Patient not taking: Reported on 8/17/2021 5/20/20   Anderson Benson MD       FAMILY HISTORY:  Family History of Breast, Ovarian, Colon or Uterine Cancer: yes breast cancer in sister  family history includes Breast Cancer (age of onset: 22) in her sister; Cancer (age of onset: 76) in her father; Diabetes in her maternal grandmother and mother; Hypertension in her mother. SOCIAL HISTORY:   reports that she has been smoking cigarettes. She started smoking about 42 years ago. She has a 42.00 pack-year smoking history. She has never used smokeless tobacco. She reports current alcohol use of about 0.8 - 1.7 standard drinks of alcohol per week. She reports that she does not use drugs. VITALS:  Vitals:    08/17/21 1514 08/17/21 1517   BP: (!) 157/85 (!) 147/82   Site: Left Upper Arm Left Upper Arm   Position: Sitting Sitting   Cuff Size: Large Adult Large Adult   Pulse: 85 80   Weight: 236 lb (107 kg)    Height: 5' 3\" (1.6 m)                                                                                                                                                                            PHYSICAL EXAM:   General Appearance: Appears healthy. Alert; in no acute distress. Pleasant. Skin: Skin color, texture, turgor normal. No rashes or lesions. Lymphatic: No abnormally enlarged lymph nodes. HEENT: normocephalic and atraumatic, Thyroid normal to inspection and palpation  Respiratory: Normal expansion.   Clear to Post-traumatic osteoarthritis of left hand 2017    Anxiety 2017       Return in about 1 year (around 2022) for annual.  No Patient Care Coordination Note on file. Counseling Completed:    discussed need for repeat pap as per American Society for Colposcopy and Cervical Pathology guidelines. discussed need for mammograms every 1 year, If >44 yo and last mammogram was negative. discussed Calcium and Vitamin D dosing. discussed need for colonoscopy screening as well as onset for bone density testing. Discussed barrier recommendations. discussed STD counseling and prevention. Hereditary Breast, Ovarian, Colon and Uterine Cancer screening discussed. Tobacco & Secondary smoke risks discussed; with recommendation for cessation and avoidance. Routine health maintenance per patients PCP discussed. Patient was seen with total face to face time of 30 minutes. More than 50% of this visit was on counseling and education regarding the problems listed below and her options. She was also counseled on her preventative health maintenance recommendations and follow-up. Diagnosis Orders   1. Well woman exam with routine gynecological exam  PAP Smear    Chlamydia Trachomatis & Neisseria gonorrhoeae (GC) by amplified detection    Vaginitis DNA Probe    Urinalysis Reflex to Culture   2. UTI symptoms          Bryson Rojas DO  Ob/Gyn Resident  [facility], [facility]  2021, 4:05 PM     Date: 2021  Time: 4:38 PM      Patient Name: Wilman Steiner  Patient : 1965  Room/Bed: Room/bed info not found  Admission Date/Time: No admission date for patient encounter. Attending Physician Statement  I have discussed the care of Wilman Steiner, including pertinent history and exam findings with the resident. I have reviewed and edited their note in the electronic medical record. The key elements of all parts of the encounter have been performed/reviewed by me .

## 2021-08-18 DIAGNOSIS — B96.89 BACTERIAL VAGINOSIS: Primary | ICD-10-CM

## 2021-08-18 DIAGNOSIS — N76.0 BACTERIAL VAGINOSIS: Primary | ICD-10-CM

## 2021-08-18 LAB
C TRACH DNA GENITAL QL NAA+PROBE: NEGATIVE
DIRECT EXAM: ABNORMAL
Lab: ABNORMAL
N. GONORRHOEAE DNA: NEGATIVE
SPECIMEN DESCRIPTION: ABNORMAL
SPECIMEN DESCRIPTION: NORMAL

## 2021-08-18 RX ORDER — METRONIDAZOLE 500 MG/1
500 TABLET ORAL 2 TIMES DAILY
Qty: 14 TABLET | Refills: 0 | Status: SHIPPED | OUTPATIENT
Start: 2021-08-18 | End: 2021-08-25

## 2021-08-19 LAB
HPV SAMPLE: NORMAL
HPV, GENOTYPE 16: NOT DETECTED
HPV, GENOTYPE 18: NOT DETECTED
HPV, HIGH RISK OTHER: NOT DETECTED
HPV, INTERPRETATION: NORMAL
SPECIMEN DESCRIPTION: NORMAL

## 2021-08-30 LAB — CYTOLOGY REPORT: NORMAL

## 2021-09-13 ENCOUNTER — HOSPITAL ENCOUNTER (OUTPATIENT)
Dept: CT IMAGING | Age: 56
Discharge: HOME | End: 2021-09-15
Payer: COMMERCIAL

## 2021-09-13 DIAGNOSIS — F17.200 SMOKER: ICD-10-CM

## 2021-09-13 DIAGNOSIS — R05.9 COUGH: ICD-10-CM

## 2021-09-13 PROCEDURE — 71250 CT THORAX DX C-: CPT

## 2021-10-18 ENCOUNTER — TELEPHONE (OUTPATIENT)
Dept: OBGYN | Age: 56
End: 2021-10-18

## 2021-10-18 NOTE — TELEPHONE ENCOUNTER
Patient called stated the antibiotics she was put on for bacterial infection was strong asking if something else could be called in please call to advise

## 2021-10-19 ENCOUNTER — TELEPHONE (OUTPATIENT)
Dept: INTERNAL MEDICINE | Age: 56
End: 2021-10-19

## 2021-10-19 NOTE — TELEPHONE ENCOUNTER
Pt scheduled for VV today 10/1. Registration attmepted to contact patient-- unable to LM.  Writer attempted to contact pt unable to LM

## 2021-10-21 NOTE — TELEPHONE ENCOUNTER
Flagyl was ordered on 8/18/21. Need more information from patient. Again attempted to contact patient and her mailbox is full.   Will await patient contact

## 2021-10-29 ENCOUNTER — VIRTUAL VISIT (OUTPATIENT)
Dept: INTERNAL MEDICINE | Age: 56
End: 2021-10-29
Payer: COMMERCIAL

## 2021-10-29 DIAGNOSIS — R05.3 CHRONIC COUGH: ICD-10-CM

## 2021-10-29 DIAGNOSIS — F17.200 SMOKER: ICD-10-CM

## 2021-10-29 DIAGNOSIS — E78.2 MIXED HYPERLIPIDEMIA: ICD-10-CM

## 2021-10-29 DIAGNOSIS — G47.33 OSA (OBSTRUCTIVE SLEEP APNEA): ICD-10-CM

## 2021-10-29 DIAGNOSIS — M79.7 FIBROMYALGIA: ICD-10-CM

## 2021-10-29 DIAGNOSIS — E66.01 MORBID OBESITY WITH BMI OF 40.0-44.9, ADULT (HCC): ICD-10-CM

## 2021-10-29 DIAGNOSIS — I10 ESSENTIAL HYPERTENSION: Primary | ICD-10-CM

## 2021-10-29 PROCEDURE — 99214 OFFICE O/P EST MOD 30 MIN: CPT | Performed by: INTERNAL MEDICINE

## 2021-10-29 RX ORDER — ATORVASTATIN CALCIUM 10 MG/1
TABLET, FILM COATED ORAL
Qty: 30 TABLET | Refills: 5 | Status: SHIPPED | OUTPATIENT
Start: 2021-10-29

## 2021-10-29 RX ORDER — FERROUS SULFATE 325(65) MG
325 TABLET ORAL DAILY
COMMUNITY
Start: 2020-12-22

## 2021-10-29 NOTE — PROGRESS NOTES
Stephon Catalan is a 49 y.o. female evaluated via telephone on 10/29/2021. Consent:  She and/or health care decision maker is aware that that she may receive a bill for this telephone service, depending on her insurance coverage, and has provided verbal consent to proceed: Yes      Documentation:  I communicated with the patient and/or health care decision maker about her chronic medical problems. She has been terminated from endocrinology office because she does not check her blood sugars and refuses to take medications other than Metformin. She is refusing to test blood sugars and wants Dexcom or other continuous glucose monitor which she has been told will not be approved by her insurance unless she is on insulin or checks her blood sugars multiple times a day. She is only taking Metformin. Not sure if she is even on statin. She does have diabetic nephropathy and it appears endocrinology did refer her to nephrology but I do not think she has seen them. She has mild intolerance to lisinopril with chronic cough. Advised her she could try losartan and she agrees. She wants to find her own endocrinologist and does not want me to refer to anybody or start any medications at this point. Advised to get labs done. She continues to refuse Covid vaccine. She does not want get PFTs or see pulmonologist because they want her Covid testing done and she does not want anything going up her nose she says. .  She has chronic cough. She continues to smoke though she says she has cut down. She had a recent low-dose CT which was reported as benign. She says she refuses to accept the test and she thinks she has cancer. She is asking for a blood test to check for lung cancer which I have told her I am not aware of any blood test to check for lung cancer. She thinks she has cancer as she is coughing every day. I have advised again for her to see a pulmonologist for review and to get her PFTs done.     Details of this discussion including any medical advice provided:   Patient advised to continue Metformin and statin and to add losartan. Get labs done in a few days time. Follow-up with nephrology and pulmonology. She does not want referral for endocrinology and states she will look for her own endocrinologist.  She continues to follow-up with rheumatology and is on tramadol for pain now along with Lyrica. Refuses Covid vaccine      I affirm this is a Patient Initiated Episode with a Patient who has not had a related appointment within my department in the past 7 days or scheduled within the next 24 hours. Patient identification was verified at the start of the visit: Yes    Total Time: minutes: 21-30 minutes    The visit was conducted pursuant to the emergency declaration under the [address] [place] [address] authority and the [employer] Act. Patient identification was verified, and a caregiver was present when appropriate. The patient was located in a state where the provider was credentialed to provide care.     Note: not billable if this call serves to triage the patient into an appointment for the relevant concern      Alejandro Ferro MD

## 2021-11-05 ENCOUNTER — TELEPHONE (OUTPATIENT)
Dept: INTERNAL MEDICINE | Age: 56
End: 2021-11-05

## 2022-03-10 ENCOUNTER — OFFICE VISIT (OUTPATIENT)
Dept: INTERNAL MEDICINE | Age: 57
End: 2022-03-10
Payer: COMMERCIAL

## 2022-03-10 VITALS
BODY MASS INDEX: 41.1 KG/M2 | WEIGHT: 232 LBS | HEART RATE: 78 BPM | DIASTOLIC BLOOD PRESSURE: 97 MMHG | SYSTOLIC BLOOD PRESSURE: 174 MMHG

## 2022-03-10 DIAGNOSIS — R05.3 CHRONIC COUGH: ICD-10-CM

## 2022-03-10 DIAGNOSIS — I50.30 DIASTOLIC CONGESTIVE HEART FAILURE, UNSPECIFIED HF CHRONICITY (HCC): ICD-10-CM

## 2022-03-10 DIAGNOSIS — F17.200 SMOKER: ICD-10-CM

## 2022-03-10 DIAGNOSIS — G47.33 OSA (OBSTRUCTIVE SLEEP APNEA): ICD-10-CM

## 2022-03-10 DIAGNOSIS — I10 ESSENTIAL HYPERTENSION: Primary | ICD-10-CM

## 2022-03-10 DIAGNOSIS — E78.2 MIXED HYPERLIPIDEMIA: ICD-10-CM

## 2022-03-10 DIAGNOSIS — M79.7 FIBROMYALGIA: ICD-10-CM

## 2022-03-10 DIAGNOSIS — E66.01 MORBID OBESITY WITH BMI OF 40.0-44.9, ADULT (HCC): ICD-10-CM

## 2022-03-10 LAB — HBA1C MFR BLD: 9.8 %

## 2022-03-10 PROCEDURE — 99214 OFFICE O/P EST MOD 30 MIN: CPT | Performed by: INTERNAL MEDICINE

## 2022-03-10 PROCEDURE — 99211 OFF/OP EST MAY X REQ PHY/QHP: CPT | Performed by: INTERNAL MEDICINE

## 2022-03-10 PROCEDURE — 4004F PT TOBACCO SCREEN RCVD TLK: CPT | Performed by: INTERNAL MEDICINE

## 2022-03-10 PROCEDURE — 3046F HEMOGLOBIN A1C LEVEL >9.0%: CPT | Performed by: INTERNAL MEDICINE

## 2022-03-10 PROCEDURE — 2022F DILAT RTA XM EVC RTNOPTHY: CPT | Performed by: INTERNAL MEDICINE

## 2022-03-10 PROCEDURE — G8427 DOCREV CUR MEDS BY ELIG CLIN: HCPCS | Performed by: INTERNAL MEDICINE

## 2022-03-10 PROCEDURE — G8417 CALC BMI ABV UP PARAM F/U: HCPCS | Performed by: INTERNAL MEDICINE

## 2022-03-10 PROCEDURE — G8484 FLU IMMUNIZE NO ADMIN: HCPCS | Performed by: INTERNAL MEDICINE

## 2022-03-10 PROCEDURE — 83036 HEMOGLOBIN GLYCOSYLATED A1C: CPT | Performed by: INTERNAL MEDICINE

## 2022-03-10 PROCEDURE — 3017F COLORECTAL CA SCREEN DOC REV: CPT | Performed by: INTERNAL MEDICINE

## 2022-03-10 RX ORDER — POTASSIUM CHLORIDE 750 MG/1
10 TABLET, EXTENDED RELEASE ORAL DAILY
Qty: 30 TABLET | Refills: 5 | Status: SHIPPED | OUTPATIENT
Start: 2022-03-10

## 2022-03-10 RX ORDER — ALBUTEROL SULFATE 90 UG/1
2 AEROSOL, METERED RESPIRATORY (INHALATION) EVERY 6 HOURS PRN
Qty: 1 EACH | Refills: 5 | Status: SHIPPED | OUTPATIENT
Start: 2022-03-10 | End: 2022-03-11

## 2022-03-10 RX ORDER — FUROSEMIDE 40 MG/1
40 TABLET ORAL DAILY
Qty: 30 TABLET | Refills: 3 | Status: SHIPPED | OUTPATIENT
Start: 2022-03-10

## 2022-03-10 SDOH — ECONOMIC STABILITY: FOOD INSECURITY: WITHIN THE PAST 12 MONTHS, YOU WORRIED THAT YOUR FOOD WOULD RUN OUT BEFORE YOU GOT MONEY TO BUY MORE.: NEVER TRUE

## 2022-03-10 SDOH — ECONOMIC STABILITY: FOOD INSECURITY: WITHIN THE PAST 12 MONTHS, THE FOOD YOU BOUGHT JUST DIDN'T LAST AND YOU DIDN'T HAVE MONEY TO GET MORE.: NEVER TRUE

## 2022-03-10 ASSESSMENT — PATIENT HEALTH QUESTIONNAIRE - PHQ9
SUM OF ALL RESPONSES TO PHQ9 QUESTIONS 1 & 2: 6
SUM OF ALL RESPONSES TO PHQ QUESTIONS 1-9: 18
6. FEELING BAD ABOUT YOURSELF - OR THAT YOU ARE A FAILURE OR HAVE LET YOURSELF OR YOUR FAMILY DOWN: 1
SUM OF ALL RESPONSES TO PHQ QUESTIONS 1-9: 18
2. FEELING DOWN, DEPRESSED OR HOPELESS: 3
3. TROUBLE FALLING OR STAYING ASLEEP: 3
4. FEELING TIRED OR HAVING LITTLE ENERGY: 3
9. THOUGHTS THAT YOU WOULD BE BETTER OFF DEAD, OR OF HURTING YOURSELF: 0
10. IF YOU CHECKED OFF ANY PROBLEMS, HOW DIFFICULT HAVE THESE PROBLEMS MADE IT FOR YOU TO DO YOUR WORK, TAKE CARE OF THINGS AT HOME, OR GET ALONG WITH OTHER PEOPLE: 2
5. POOR APPETITE OR OVEREATING: 1
SUM OF ALL RESPONSES TO PHQ QUESTIONS 1-9: 18
1. LITTLE INTEREST OR PLEASURE IN DOING THINGS: 3
SUM OF ALL RESPONSES TO PHQ QUESTIONS 1-9: 18
8. MOVING OR SPEAKING SO SLOWLY THAT OTHER PEOPLE COULD HAVE NOTICED. OR THE OPPOSITE, BEING SO FIGETY OR RESTLESS THAT YOU HAVE BEEN MOVING AROUND A LOT MORE THAN USUAL: 2
7. TROUBLE CONCENTRATING ON THINGS, SUCH AS READING THE NEWSPAPER OR WATCHING TELEVISION: 2

## 2022-03-10 ASSESSMENT — ENCOUNTER SYMPTOMS
PHOTOPHOBIA: 0
COUGH: 0
SHORTNESS OF BREATH: 0
WHEEZING: 1

## 2022-03-10 ASSESSMENT — SOCIAL DETERMINANTS OF HEALTH (SDOH): HOW HARD IS IT FOR YOU TO PAY FOR THE VERY BASICS LIKE FOOD, HOUSING, MEDICAL CARE, AND HEATING?: NOT VERY HARD

## 2022-03-10 NOTE — PROGRESS NOTES
[facility]ICINE [facility]    Progress Note    Date of patient's visit: 3/10/2022    Patient's Name:  Kevin De La Fuente    YOB: 1965            Patient Care Team:  Mai Tran MD as PCP - General (Internal Medicine)  Mai Tran MD as PCP - [facility] Empaneled Provider  Merary Landa MD as Referring Physician (Internal Medicine)    REASON FOR VISIT: Routine outpatient follow     Chief Complaint   Patient presents with    Diabetes     pt would like to discuss getting dexcom reader.  Hypertension    Health Maintenance     labs reprinted, PFt reprinted, A1C done, depression screen completed     Leg Swelling     Pt c/o having bilateral lower leg edema over the last few months     Depression     score 18          HISTORY OF PRESENT ILLNESS:    History was obtained from the patient. Kevin De La Fuente is a 49 y.o. is here for follow-up. She is not taking any medications except for Lyrica, tramadol and tizanidine. She refuses to take any other medications. She says her blood pressure is elevated because of pain and will not take any blood pressure medications. She does want some Lasix as her legs are swollen which is chronic. She does not want to discuss her depression which again she feels is from her pain. She has seen 2 different rheumatologist.  Currently she is seeing Dr. Rachel Parks. She has been told by both of them that she has fibromyalgia and osteoarthritis. She is on tramadol and Lyrica and tizanidine from them. She  Was terminated from the practice of Dr. Payton Model her endocrinologist as she refused to check her blood sugars or take any of her medications. She keeps saying she has neuropathy and she cannot poke her fingers. Last visit she told me she wanted a endocrinologist but would find one for herself. Today she says she would take a referral from me for a new endocrinologist but said he states\" she wants an American doctor\".   She states she has had doctors who are foreign-born and she does not trust them more they do not understand her language and she does not understand them  She refuses to start any of her medications for chronic conditions including diabetes or lipids or hypertension. She has CHARLES. She never got her CPAP which was ordered 2 years ago. A repeat sleep study was ordered last year but she did not do that as well. She has missed all her PFTs as well. She says she will do the echocardiogram.        CT chest 2021     FINDINGS:   Mediastinum: Thoracic aorta demonstrates mild calcification without aneurysm. Pulmonary trunk appears nondilated.  No pericardial effusion.  No   lymphadenopathy.  The esophagus is grossly.       Lungs/pleura: No focal consolidation, pneumothorax or pleural effusion.  Mild   lung scarring.  No suspicious pulmonary nodule       Upper Abdomen: No acute findings.       Soft Tissues/Bones: Visualized soft tissues surrounding chest wall   demonstrate no acute findings.  Osseous structures demonstrate degenerative   change.           Impression   No acute cardiopulmonary process.  Small cyst right upper lobe. No results found for this visit on 03/10/22.       Past Medical History:   Diagnosis Date    Anxiety 2/23/2017    Chronic back pain     [facility] pulmonary disease[facility])     Fibromyalgia 3/12/2019    GERD (gastroesophageal reflux disease)     resolved not taking medds    HSV-2 (herpes simplex virus 2) infection 7/2/2013    Hyperlipidemia     Hypertension     Neuropathy     feet    Obesity     CHARLES (obstructive sleep apnea) 2/19/2021    Osteoarthritis     Post-traumatic osteoarthritis of left hand     Type II or unspecified type diabetes mellitus without mention of complication, not stated as uncontrolled     Uncontrolled type 2 diabetes mellitus without complication, without long-term current use of insulin 5/22/2019    Urinary incontinence        Past Surgical History: Procedure Laterality Date    TONSILLECTOMY AND ADENOIDECTOMY      TUBAL LIGATION           ALLERGIES      Allergies   Allergen Reactions    Latex Hives    Vicodin [Hydrocodone-Acetaminophen] Hives and Itching    Zocor [Simvastatin]     Lisinopril      Cough         MEDICATIONS:      Current Outpatient Medications on File Prior to Visit   Medication Sig Dispense Refill    ferrous sulfate (IRON 325) 325 (65 Fe) MG tablet Take 325 mg by mouth daily      EQ ASPIRIN ADULT LOW DOSE 81 MG EC tablet Take 1 tablet by mouth once daily 90 tablet 1    metFORMIN (GLUCOPHAGE) 1000 MG tablet Take 1 tablet by mouth twice daily with food 180 tablet 3    traMADol (ULTRAM) 50 MG tablet       tiZANidine (ZANAFLEX) 4 MG tablet Take 1 tablet by mouth 2 times daily 60 tablet 0    pregabalin (LYRICA) 200 MG capsule TAKE 1 TABLET BY MOUTH NIGHTLY FOR 30 DAYS (Patient taking differently: 200 mg 2 times daily. ) 30 capsule 1    albuterol sulfate HFA (PROVENTIL HFA) 108 (90 Base) MCG/ACT inhaler Inhale 2 puffs into the lungs every 6 hours as needed for Wheezing 1 Inhaler 3    blood glucose test strips (TRUE METRIX BLOOD GLUCOSE TEST) strip USE 1 STRIP TO CHECK GLUCOSE TWICE DAILY 100 strip 11    Lancets MISC USE 1 LANCET TO CHECK GLUCOSE TWICE DAILY 100 each 11    atorvastatin (LIPITOR) 10 MG tablet Once daily (Patient not taking: Reported on 3/10/2022) 30 tablet 5    glimepiride (AMARYL) 2 MG tablet Take 1 tablet by mouth every morning (Patient not taking: Reported on 3/10/2022) 90 tablet 1     Current Facility-Administered Medications on File Prior to Visit   Medication Dose Route Frequency Provider Last Rate Last Admin    cyanocobalamin injection 1,000 mcg  1,000 mcg IntraMUSCular Once Anila Bishop MD           HISTORY    Reviewed and no change from previous record. John Mcqueen  reports that she has been smoking cigarettes. She started smoking about 43 years ago. She has a 42.00 pack-year smoking history.  She has never used smokeless tobacco.    FAMILY HISTORY:    Reviewed and No change from previous visit    HEALTH MAINTENANCE DUE:      Health Maintenance Due   Topic Date Due    COVID-19 Vaccine (1) Never done    Depression Monitoring  Never done    A1C test (Diabetic or Prediabetic)  08/06/2021    Lipid screen  12/02/2021    Potassium monitoring  12/02/2021    Creatinine monitoring  12/02/2021       REVIEW OF SYSTEMS:    12 point review of symptoms completed and found to be normal except noted in the HPI    Review of Systems   Constitutional: Negative for fatigue and unexpected weight change. Eyes: Negative for photophobia and visual disturbance. Respiratory: Positive for wheezing. Negative for cough and shortness of breath. Cardiovascular: Positive for leg swelling. Negative for chest pain and palpitations. Endocrine: Negative for polydipsia and polyuria. Musculoskeletal: Positive for arthralgias and myalgias. Neurological: Positive for numbness. Negative for dizziness and weakness. Psychiatric/Behavioral: Positive for dysphoric mood and sleep disturbance. Negative for suicidal ideas. The patient is not nervous/anxious. PHYSICAL EXAM:     Vitals:    03/10/22 1432   BP: (!) 159/89   Site: Right Upper Arm   Position: Sitting   Cuff Size: Large Adult   Pulse: 78   Weight: 232 lb (105.2 kg)     Body mass index is 41.1 kg/m². BP Readings from Last 3 Encounters:   03/10/22 (!) 159/89   08/17/21 (!) 147/82   07/16/21 124/88        Wt Readings from Last 3 Encounters:   03/10/22 232 lb (105.2 kg)   08/17/21 236 lb (107 kg)   07/16/21 232 lb (105.2 kg)       Physical Exam  Vitals and nursing note reviewed. Constitutional:       Appearance: Normal appearance. She is obese. HENT:      Head: Normocephalic and atraumatic. Eyes:      Extraocular Movements: Extraocular movements intact. Conjunctiva/sclera: Conjunctivae normal.      Pupils: Pupils are equal, round, and reactive to light. Cardiovascular:      Rate and Rhythm: Normal rate and regular rhythm. Heart sounds: No murmur heard. Pulmonary:      Effort: Pulmonary effort is normal.      Breath sounds: Normal breath sounds. No wheezing. Musculoskeletal:      Right lower leg: Edema present. Left lower leg: Edema present. Skin:     General: Skin is warm and dry. Neurological:      General: No focal deficit present. Mental Status: She is alert and oriented to person, place, and time. LABORATORY FINDINGS:    CBC:  Lab Results   Component Value Date    WBC 11.3 12/02/2020    HGB 13.2 12/02/2020     12/02/2020     BMP:    Lab Results   Component Value Date     12/02/2020    K 4.3 12/02/2020     12/02/2020    CO2 21 12/02/2020    BUN 11 12/02/2020    CREATININE 0.81 12/02/2020    GLUCOSE 308 12/02/2020    GLUCOSE 201 02/17/2012     HEMOGLOBIN A1C:   Lab Results   Component Value Date    LABA1C 11.0 05/06/2021     MICROALBUMIN URINE:   Lab Results   Component Value Date    MICROALBUR 30 09/25/2018     FASTING LIPID PANEL:  Lab Results   Component Value Date    CHOL 175 07/23/2020    HDL 34 (L) 12/02/2020    TRIG 267 (H) 07/23/2020     Lab Results   Component Value Date    LDLCHOLESTEROL      12/02/2020    LDLDIRECT 84 12/02/2020       LIVER PROFILE:  Lab Results   Component Value Date    ALT 25 12/02/2020    AST 22 12/02/2020    PROT 6.8 12/02/2020    BILITOT <0.10 12/02/2020    BILIDIR 0.09 02/17/2012    LABALBU 3.9 12/02/2020    LABALBU 4.3 02/17/2012      THYROID FUNCTION:   Lab Results   Component Value Date    TSH 2.46 12/02/2020      URINEANALYSIS: No results found for: LABURIN  ASSESSMENT AND PLAN:    1. Essential hypertension    - Comprehensive Metabolic Panel; Future  - CBC; Future  - furosemide (LASIX) 40 MG tablet; Take 1 tablet by mouth daily (Patient not taking: Reported on 3/31/2022)  Dispense: 30 tablet; Refill: 3    2.  Uncontrolled diabetes mellitus with diabetic nephropathy, without long-term current use of insulin (HCC)    - Microalbumin / Creatinine Urine Ratio; Future  - Lipid Panel; Future  - Comprehensive Metabolic Panel; Future  - Brittany Schmitz MD, Endocrinology, [place]    3. Chronic cough  PFT's pending     4. Mixed hyperlipidemia    - Lipid Panel; Future  - Comprehensive Metabolic Panel; Future    5. Diastolic congestive heart failure, unspecified HF chronicity (HCC)    - furosemide (LASIX) 40 MG tablet; Take 1 tablet by mouth daily (Patient not taking: Reported on 3/31/2022)  Dispense: 30 tablet; Refill: 3  - potassium chloride (KLOR-CON M) 10 MEQ extended release tablet; Take 1 tablet by mouth daily (Patient not taking: Reported on 3/31/2022)  Dispense: 30 tablet; Refill: 5  - ECHO Complete 2D W Doppler W Color; Future    6. Smoker      7. Morbid obesity with BMI of 40.0-44.9, adult ([facility])  Lifestyle changes     8. Fibromyalgia  Follows up with rheumatology     9. CHARLES (obstructive sleep apnea)  Not using cpap           FOLLOW UP AND INSTRUCTIONS:   Return in about 3 months (around 6/10/2022). 1. Te Santiago received counseling on the following healthy behaviors: nutrition, exercise and medication adherence    2. Reviewed prior labs and health maintenance. 3. Discussed use, benefit, and side effects of prescribed medications. Barriers to medication compliance addressed. All patient questions answered. Pt voiced understanding.      4. Patient given educational materials - see patient instructions    Titus Maldonado  Attending Physician, [address] [place] [address], Internal Medicine Residency Program  [address] [place] [address]  3/10/2022, 2:42 PM

## 2022-03-10 NOTE — PATIENT INSTRUCTIONS
-Pt due for 3 month f/u in June-- pt to call in May to set up an appt--reminder in [facility] to contact patient as well--AVS given to patient    -Bloodwork orders given to patient, they will have them done before their next visit.     -Your doctor has ordered a ECHO for you, please contact our scheduling department at [phone] to set up an appointment to have this completed before your next visit. Referral to Endocrinology given to patient with number to call, patient will call to schedule. 9 Nasima Baker MD   [address] [facility] [facility] [address]   [facility]   [phone]    -F. Avani Hill

## 2022-03-11 ENCOUNTER — TELEPHONE (OUTPATIENT)
Dept: INTERNAL MEDICINE | Age: 57
End: 2022-03-11

## 2022-03-11 DIAGNOSIS — R05.3 CHRONIC COUGH: ICD-10-CM

## 2022-03-11 DIAGNOSIS — F17.200 SMOKER: ICD-10-CM

## 2022-03-11 RX ORDER — ALBUTEROL SULFATE 90 UG/1
2 AEROSOL, METERED RESPIRATORY (INHALATION) 4 TIMES DAILY PRN
Qty: 54 G | Refills: 1 | Status: SHIPPED | OUTPATIENT
Start: 2022-03-11

## 2022-03-31 ENCOUNTER — OFFICE VISIT (OUTPATIENT)
Dept: INTERNAL MEDICINE | Age: 57
End: 2022-03-31
Payer: COMMERCIAL

## 2022-03-31 VITALS
DIASTOLIC BLOOD PRESSURE: 80 MMHG | BODY MASS INDEX: 40.4 KG/M2 | TEMPERATURE: 98.2 F | HEART RATE: 85 BPM | WEIGHT: 228 LBS | HEIGHT: 63 IN | OXYGEN SATURATION: 96 % | SYSTOLIC BLOOD PRESSURE: 129 MMHG

## 2022-03-31 DIAGNOSIS — J32.0 CHRONIC MAXILLARY SINUSITIS: ICD-10-CM

## 2022-03-31 DIAGNOSIS — I10 ESSENTIAL HYPERTENSION: ICD-10-CM

## 2022-03-31 DIAGNOSIS — R05.3 CHRONIC COUGH: ICD-10-CM

## 2022-03-31 DIAGNOSIS — H61.22 IMPACTED CERUMEN OF LEFT EAR: Primary | ICD-10-CM

## 2022-03-31 PROCEDURE — 99211 OFF/OP EST MAY X REQ PHY/QHP: CPT | Performed by: INTERNAL MEDICINE

## 2022-03-31 PROCEDURE — G8427 DOCREV CUR MEDS BY ELIG CLIN: HCPCS | Performed by: STUDENT IN AN ORGANIZED HEALTH CARE EDUCATION/TRAINING PROGRAM

## 2022-03-31 PROCEDURE — G8484 FLU IMMUNIZE NO ADMIN: HCPCS | Performed by: STUDENT IN AN ORGANIZED HEALTH CARE EDUCATION/TRAINING PROGRAM

## 2022-03-31 PROCEDURE — G8417 CALC BMI ABV UP PARAM F/U: HCPCS | Performed by: STUDENT IN AN ORGANIZED HEALTH CARE EDUCATION/TRAINING PROGRAM

## 2022-03-31 PROCEDURE — 4004F PT TOBACCO SCREEN RCVD TLK: CPT | Performed by: STUDENT IN AN ORGANIZED HEALTH CARE EDUCATION/TRAINING PROGRAM

## 2022-03-31 PROCEDURE — 3017F COLORECTAL CA SCREEN DOC REV: CPT | Performed by: STUDENT IN AN ORGANIZED HEALTH CARE EDUCATION/TRAINING PROGRAM

## 2022-03-31 PROCEDURE — 99213 OFFICE O/P EST LOW 20 MIN: CPT | Performed by: STUDENT IN AN ORGANIZED HEALTH CARE EDUCATION/TRAINING PROGRAM

## 2022-03-31 ASSESSMENT — ENCOUNTER SYMPTOMS
COUGH: 0
SINUS PRESSURE: 1
BACK PAIN: 1
CHEST TIGHTNESS: 0
PHOTOPHOBIA: 0
VOMITING: 0
SORE THROAT: 0
DIARRHEA: 0
ABDOMINAL PAIN: 1
NAUSEA: 0
RHINORRHEA: 1
SHORTNESS OF BREATH: 0

## 2022-03-31 NOTE — PROGRESS NOTES
[facility]  [address] [address]  Dept: [phone]  Dept Fax: [phone]    Office Progress/Follow Up Note  Date of patient's visit: 3/31/2022  Patient's Name:  Lupillo Chávez YOB: 1965            Patient Care Team:  Anh Judge MD as PCP - General (Internal Medicine)  Anh Judge MD as PCP - [facility]  Stephan Peck MD as Referring Physician (Internal Medicine)  ________________________________________________________________________      Reason for Visit: Same day visit  ________________________________________________________________________  Chief Complaint:  Ear Problem (left ear pain, loss of hearing, pt heard a ringing noise x3 days) and Health Maintenance (same day, labs r/p)    ________________________________________________________________________  History of Presenting Illness:  History was obtained from: patient, electronic medical record. Lupillo Chávez is a 49 y.o. is here for a same day visit. Patient comes in today for complaints of left ear pain 3 days ago. She also reports an episode of tinnitus and left sided hearing loss. She also complaints of difficulty breathing through the nose and states she has been breathing through the mouth for years,  She reports significant nasal discharge that is worse in the mornings as well as bilateral frontal headaches. She reports episodes in the past where she was told she had fluid in her ear. She states that she normally cleans her ears with water and her finger during her showers. Due to concerns about hearing loss, she comes in to the office today to get evaluated. She is unable to tell me if she still has the hearing loss. She does not seem to preferentially hear better in any one ear. She has significant past medical history of Type 2 DM, Fibromyalgia and Hypertension.     Patient Active Problem List   Diagnosis    Class 2 severe obesity due to excess calories with serious comorbidity and body mass index (BMI) of 38.0 to 38.9 in adult ([facility] [address]    Hyperlipidemia    Anxiety    Mucoid cyst of joint    Post-traumatic osteoarthritis of left hand    Essential hypertension    Fibromyalgia    Type 2 diabetes mellitus without complication, without long-term current use of insulin ([facility])    Vitamin D deficiency    CHARLES (obstructive sleep apnea)    Abnormal mammogram of left breast. (8/2020)       Allergies   Allergen Reactions    Latex Hives    Vicodin [Hydrocodone-Acetaminophen] Hives and Itching    Zocor [Simvastatin]     Lisinopril      Cough           Current Outpatient Medications   Medication Sig Dispense Refill    carbamide peroxide (DEBROX) 6.5 % otic solution Place 5 drops in ear(s) 2 times daily 15 mL 0    albuterol sulfate HFA (VENTOLIN HFA) 108 (90 Base) MCG/ACT inhaler Inhale 2 puffs into the lungs 4 times daily as needed for Wheezing 54 g 1    ferrous sulfate (IRON 325) 325 (65 Fe) MG tablet Take 325 mg by mouth daily      atorvastatin (LIPITOR) 10 MG tablet Once daily 30 tablet 5    metFORMIN (GLUCOPHAGE) 1000 MG tablet Take 1 tablet by mouth twice daily with food 180 tablet 3    traMADol (ULTRAM) 50 MG tablet       tiZANidine (ZANAFLEX) 4 MG tablet Take 1 tablet by mouth 2 times daily 60 tablet 0    pregabalin (LYRICA) 200 MG capsule TAKE 1 TABLET BY MOUTH NIGHTLY FOR 30 DAYS (Patient taking differently: 300 mg 2 times daily. ) 30 capsule 1    furosemide (LASIX) 40 MG tablet Take 1 tablet by mouth daily (Patient not taking: Reported on 3/31/2022) 30 tablet 3    potassium chloride (KLOR-CON M) 10 MEQ extended release tablet Take 1 tablet by mouth daily (Patient not taking: Reported on 3/31/2022) 30 tablet 5    EQ ASPIRIN ADULT LOW DOSE 81 MG EC tablet Take 1 tablet by mouth once daily (Patient not taking: Reported on 3/31/2022) 90 tablet 1    glimepiride (AMARYL) 2 MG tablet Take 1 tablet by mouth every morning (Patient not taking: Reported on 3/10/2022) 90 tablet 1    blood glucose test strips (TRUE METRIX BLOOD GLUCOSE TEST) strip USE 1 STRIP TO CHECK GLUCOSE TWICE DAILY (Patient not taking: Reported on 3/31/2022) 100 strip 11    Lancets MISC USE 1 LANCET TO CHECK GLUCOSE TWICE DAILY (Patient not taking: Reported on 3/31/2022) 100 each 11     Current Facility-Administered Medications   Medication Dose Route Frequency Provider Last Rate Last Admin    cyanocobalamin injection 1,000 mcg  1,000 mcg IntraMUSCular Once Melody Abdullahi MD           Social History     Tobacco Use    Smoking status: Current Every Day Smoker     Packs/day: 1.00     Years: 42.00     Pack years: 42.00     Types: Cigarettes     Start date:     Smokeless tobacco: Never Used   Substance Use Topics    Alcohol use: Not Currently     Alcohol/week: 0.8 - 1.7 standard drinks     Types: 1 - 2 Standard drinks or equivalent per week     Comment: socially    Drug use: No       Family History   Problem Relation Age of Onset    Diabetes Mother     Hypertension Mother     Cancer Father 76        rectal cancer    Breast Cancer Sister 22        had a double mastectomy    Diabetes Maternal Grandmother     Colon Cancer Neg Hx     Eclampsia Neg Hx     Ovarian Cancer Neg Hx      Labor Neg Hx     Spont Abortions Neg Hx     Stroke Neg Hx       ________________________________________________________________________  Review of Systems:  Review of Systems   Constitutional: Positive for fatigue. Negative for chills, diaphoresis and fever. HENT: Positive for ear pain, hearing loss, rhinorrhea and sinus pressure. Negative for sore throat. Eyes: Negative for photophobia. Respiratory: Negative for cough, chest tightness and shortness of breath. Cardiovascular: Negative for chest pain. Gastrointestinal: Positive for abdominal pain. Negative for diarrhea, nausea and vomiting. Genitourinary: Negative for dysuria, frequency and urgency. Musculoskeletal: Positive for back pain and myalgias. Negative for neck stiffness. Skin: Negative for rash. Neurological: Positive for headaches. Negative for seizures and speech difficulty. Psychiatric/Behavioral: Negative for agitation, confusion and decreased concentration. ________________________________________________________________________  Physical Exam:  Vitals:    03/31/22 1503   BP: 129/80   Site: Left Upper Arm   Position: Sitting   Cuff Size: Medium Adult   Pulse: 85   Temp: 98.2 °F (36.8 °C)   TempSrc: Infrared   SpO2: 96%   Weight: 228 lb (103.4 kg)   Height: 5' 3\" (1.6 m)     BP Readings from Last 3 Encounters:   03/31/22 129/80   03/10/22 (!) 174/97   08/17/21 (!) 147/82      Physical Exam  Vitals reviewed. Constitutional:       Appearance: Normal appearance. HENT:      Head: Normocephalic and atraumatic. Right Ear: Ear canal and external ear normal. Tympanic membrane is not perforated or erythematous. Left Ear: External ear normal. There is impacted cerumen. Tympanic membrane is not perforated or erythematous. Ears:      Novak exam findings: does not lateralize. Right Rinne: AC > BC. Left Rinne: AC > BC. Mouth/Throat:      Mouth: Mucous membranes are moist.   Eyes:      Extraocular Movements: Extraocular movements intact. Conjunctiva/sclera: Conjunctivae normal.   Cardiovascular:      Rate and Rhythm: Normal rate and regular rhythm. Heart sounds: Normal heart sounds. No murmur heard. Pulmonary:      Effort: Pulmonary effort is normal. No respiratory distress. Breath sounds: Normal breath sounds. No rhonchi or rales. Abdominal:      General: Abdomen is flat. There is no distension. Palpations: Abdomen is soft. Tenderness: There is abdominal tenderness (epigastrium). Musculoskeletal:         General: No deformity. Normal range of motion. Cervical back: Normal range of motion and neck supple. No rigidity.       Right lower leg: No edema. Left lower leg: No edema. Skin:     General: Skin is warm and dry. Neurological:      General: No focal deficit present. Mental Status: She is alert. Mental status is at baseline. Psychiatric:         Mood and Affect: Mood normal.           ________________________________________________________________________  Diagnostic findings:  CBC:  Lab Results   Component Value Date    WBC 11.3 12/02/2020    HGB 13.2 12/02/2020     12/02/2020       BMP:    Lab Results   Component Value Date     12/02/2020    K 4.3 12/02/2020     12/02/2020    CO2 21 12/02/2020    BUN 11 12/02/2020    CREATININE 0.81 12/02/2020    GLUCOSE 308 12/02/2020    GLUCOSE 201 02/17/2012       HEMOGLOBIN A1C:   Lab Results   Component Value Date    LABA1C 9.8 03/10/2022       FASTING LIPID PANEL:  Lab Results   Component Value Date    CHOL 175 07/23/2020    HDL 34 (L) 12/02/2020    TRIG 267 (H) 07/23/2020     ________________________________________________________________________  Health Maintenance:  Health Maintenance Due   Topic Date Due    COVID-19 Vaccine (1) Never done    Lipid screen  12/02/2021    Potassium monitoring  12/02/2021    Creatinine monitoring  12/02/2021    Diabetic retinal exam  04/13/2022     ________________________________________________________________________  Assessment and Plan:  Deonna Olmedo was seen today for ear problem and health maintenance. Diagnoses and all orders for this visit:    1. Impacted cerumen of left ear  - Left sided cerumen present. Will start peroxide ear drops and follow up with ENT  - Annmarie Wynne MD, Otolaryngology, [place]  - carbamide peroxide [facility]) 6.5 % otic solution; Place 5 drops in ear(s) 2 times daily  Dispense: 15 mL; Refill: 0    2. Chronic maxillary sinusitis  - Chronic mouth breather, frontal and maxillary headaches. - Difficulty breathing through the nose. - Annmarie Wynne MD, Otolaryngology, [place]    3. Chronic cough  - Diagnosis of COPD. Chronic smoker since age of 18.   - Patient has inadequate understanding of disease process. - Gaurav Faith MD, Pulmonology, [place]    4. Essential hypertension  - Not on any antihypertensives at this time. - Blood pressure during this visit was within normal limits. ________________________________________________________________________  Follow up and instructions:  Return if symptoms worsen or fail to improve. · Juliette received counseling on the following healthy behaviors: tobacco cessation    · Discussed use, benefit, and side effects of prescribed medications. Barriers to medication compliance addressed. All patient questions answered. Pt voiced understanding. · Patient given educational materials - see patient instructions    Chloe Hankins MD  PGY-3, Internal Medicine Resident  [facility]         3/31/2022, 4:23 PM      This note is created with the assistance of a speech-recognition program. While intending to generate a document that actually reflects the content of the visit, the document can still have some mistakes which may not have been identified and corrected by editing.

## 2022-03-31 NOTE — PROGRESS NOTES
Attending Physician Statement  I have discussed the care of Jeremiah Drake, including pertinent history and exam findings,  with the resident. I have reviewed the key elements of all parts of the encounter with the resident. I agree with the assessment, plan and orders as documented by the resident.   (GE Modifier)

## 2022-03-31 NOTE — PATIENT INSTRUCTIONS
Follow up with Dr Susan Guzman, ENT  Follow up with Dr Oskar Campuzano, Pulmonology    Medications e-scribe to pharmacy of pt's choice. Referral to ENT and Pulmonology was placed, summary of care printed and faxed to office, phone numbers mailed to the patient, they will contact office for an appt    Patient was put on a wait list and will be contacted to schedule their next follow up appointment once the schedule is available. If the patient is in need of an appointment before their next visit please call the office at [phone]. After Visit Summary  mailed to patient.     SG

## 2022-04-01 ENCOUNTER — HOSPITAL ENCOUNTER (EMERGENCY)
Age: 57
Discharge: HOME | End: 2022-04-01
Attending: EMERGENCY MEDICINE
Payer: COMMERCIAL

## 2022-04-01 VITALS
HEART RATE: 98 BPM | HEIGHT: 63 IN | SYSTOLIC BLOOD PRESSURE: 158 MMHG | RESPIRATION RATE: 18 BRPM | BODY MASS INDEX: 40.4 KG/M2 | WEIGHT: 228 LBS | DIASTOLIC BLOOD PRESSURE: 91 MMHG | OXYGEN SATURATION: 97 %

## 2022-04-01 DIAGNOSIS — S61.412A LACERATION OF LEFT HAND WITHOUT FOREIGN BODY, INITIAL ENCOUNTER: Primary | ICD-10-CM

## 2022-04-01 PROCEDURE — 99282 EMERGENCY DEPT VISIT SF MDM: CPT

## 2022-04-01 PROCEDURE — 6370000000 HC RX 637 (ALT 250 FOR IP): Performed by: PEDIATRICS

## 2022-04-01 PROCEDURE — 12002 RPR S/N/AX/GEN/TRNK2.6-7.5CM: CPT

## 2022-04-01 RX ORDER — CEPHALEXIN 500 MG/1
500 CAPSULE ORAL 4 TIMES DAILY
Qty: 28 CAPSULE | Refills: 0 | Status: SHIPPED | OUTPATIENT
Start: 2022-04-01 | End: 2022-04-08

## 2022-04-01 RX ORDER — IBUPROFEN 800 MG/1
800 TABLET ORAL EVERY 8 HOURS PRN
Qty: 20 TABLET | Refills: 1 | Status: SHIPPED | OUTPATIENT
Start: 2022-04-01 | End: 2023-04-21

## 2022-04-01 RX ORDER — GINSENG 100 MG
CAPSULE ORAL
Qty: 14 G | Refills: 0 | Status: SHIPPED | OUTPATIENT
Start: 2022-04-01 | End: 2022-04-11

## 2022-04-01 RX ORDER — IBUPROFEN 800 MG/1
800 TABLET ORAL ONCE
Status: COMPLETED | OUTPATIENT
Start: 2022-04-01 | End: 2022-04-01

## 2022-04-01 RX ADMIN — IBUPROFEN 800 MG: 800 TABLET, FILM COATED ORAL at 17:40

## 2022-04-01 ASSESSMENT — ENCOUNTER SYMPTOMS
SORE THROAT: 0
WHEEZING: 0
EYE DISCHARGE: 0
RHINORRHEA: 0
SHORTNESS OF BREATH: 0
EYE REDNESS: 0
CHOKING: 0
CONSTIPATION: 0
VOMITING: 0
DIARRHEA: 0
COUGH: 0

## 2022-04-01 ASSESSMENT — PAIN DESCRIPTION - ORIENTATION: ORIENTATION: LEFT

## 2022-04-01 ASSESSMENT — PAIN DESCRIPTION - PROGRESSION: CLINICAL_PROGRESSION: NOT CHANGED

## 2022-04-01 ASSESSMENT — PAIN DESCRIPTION - PAIN TYPE: TYPE: ACUTE PAIN

## 2022-04-01 ASSESSMENT — PAIN DESCRIPTION - ONSET: ONSET: ON-GOING

## 2022-04-01 ASSESSMENT — PAIN SCALES - GENERAL
PAINLEVEL_OUTOF10: 10
PAINLEVEL_OUTOF10: 8

## 2022-04-01 ASSESSMENT — PAIN DESCRIPTION - DESCRIPTORS: DESCRIPTORS: SHARP

## 2022-04-01 ASSESSMENT — PAIN DESCRIPTION - LOCATION: LOCATION: HAND

## 2022-04-01 ASSESSMENT — PAIN DESCRIPTION - FREQUENCY: FREQUENCY: CONTINUOUS

## 2022-04-01 NOTE — ED PROVIDER NOTES
[address] [facility]     Emergency Department     Faculty Attestation    I performed a history and physical examination of the patient and discussed management with the resident. I have reviewed and agree with the residents findings including all diagnostic interpretations, and treatment plans as written. Any areas of disagreement are noted on the chart. I was personally present for the key portions of any procedures. I have documented in the chart those procedures where I was not present during the key portions. I have reviewed the emergency nurses triage note. I agree with the chief complaint, past medical history, past surgical history, allergies, medications, social and family history as documented unless otherwise noted below. Documentation of the HPI, Physical Exam and Medical Decision Making performed by sánchezibayo is based on my personal performance of the HPI, PE and MDM. For Physician Assistant/ Nurse Practitioner cases/documentation I have personally evaluated this patient and have completed at least one if not all key elements of the E/M (history, physical exam, and MDM). Additional findings are as noted. 1 inch superficial laceration to base of left thumb, neuro intact, and able to oppose with 5/5 motor strength,    Plan for tetanus update, and suture repair.       Araceli Eckert D.O, M.P.H  Attending Emergency Medicine Physician         Araceli Eckert,   04/01/22 [id number]

## 2022-04-01 NOTE — ED PROVIDER NOTES
FACULTY SIGN-OUT  ADDENDUM       Patient: Kendrick Dumas   MRN: [id number]  PCP:  Gela Frausto MD  Attestation  I was available and discussed any additional care issues that arose and coordinated the management plans with the resident(s) caring for the patient during my duty period. Any areas of disagreement with resident's documentation of care or procedures are noted on the chart. I was personally present for the key portions of any/all procedures during my duty period. I have documented in the chart those procedures where I was not present during the key portions. The patient's initial evaluation and plan have been discussed with the prior provider who initially evaluated the patient. Pertinent Comments: The patient is a 49 y.o. female taken in signout with laceration on  knife to the hand with no obvious tendon function apparently neurovascular intact distally. This was repaired prior to my involvement  We are awaiting discharge from the emergency room    ED COURSE      The patient was given the following medications:  Orders Placed This Encounter   Medications    Tetanus-Diphth-Acell Pertussis (BOOSTRIX) injection 0.5 mL    ibuprofen (ADVIL;MOTRIN) tablet 800 mg    cephALEXin (KEFLEX) 500 MG capsule     Sig: Take 1 capsule by mouth 4 times daily for 7 days     Dispense:  28 capsule     Refill:  0    ibuprofen (ADVIL;MOTRIN) 800 MG tablet     Sig: Take 1 tablet by mouth every 8 hours as needed for Pain     Dispense:  20 tablet     Refill:  1    bacitracin 500 UNIT/GM ointment     Sig: Apply topically 2 times daily.      Dispense:  14 g     Refill:  0       RECENT VITALS:   BP: (!) 158/91  Pulse: 98  Resp: 18    SpO2: 97 %    (Please note that portions of this note were completed with a voice recognition program.  Efforts were made to edit the dictations but occasionally words are mis-transcribed.)    Elise Snellen, MD Lael Seals  Attending Emergency Medicine Physician Ana Phoenix MD  04/01/22 [id number]

## 2022-04-01 NOTE — ED PROVIDER NOTES
[address] ED  Emergency Department Encounter  EmergencyMedicine Resident     Pt Tank Clifton  MRN: [id number]  [place] 1965  Date of evaluation: 4/1/22  PCP:  Cooper Burns MD    CHIEF COMPLAINT       Chief Complaint   Patient presents with    Laceration     left hand, with butchers knife       HISTORY OF PRESENT ILLNESS  (Location/Symptom, Timing/Onset, Context/Setting, Quality, Duration, Modifying Factors, Severity.)      Agnes Garg is a 49 y.o. female with sig pmhx of   WT: Weight: 228 lb (103.4 kg)    Dx: <principal problem not specified>     Patient Active Problem List   Diagnosis    Class 2 severe obesity due to excess calories with serious comorbidity and body mass index (BMI) of 38.0 to 38.9 in adult ([address]    Hyperlipidemia    Anxiety    Mucoid cyst of joint    Post-traumatic osteoarthritis of left hand    Essential hypertension    Fibromyalgia    Type 2 diabetes mellitus without complication, without long-term current use of insulin ([address]    Vitamin D deficiency    CHARLES (obstructive sleep apnea)    Abnormal mammogram of left breast. (8/2020)     PMH:  has a past medical history of Anxiety, Chronic back pain, COPD (chronic obstructive pulmonary disease) ([address] Fibromyalgia, GERD (gastroesophageal reflux disease), HSV-2 (herpes simplex virus 2) infection, Hyperlipidemia, Hypertension, Neuropathy, Obesity, CHARLES (obstructive sleep apnea), Osteoarthritis, Post-traumatic osteoarthritis of left hand, Type II or unspecified type diabetes mellitus without mention of complication, not stated as uncontrolled, Uncontrolled type 2 diabetes mellitus without complication, without long-term current use of insulin, and Urinary incontinence.     who presents with laceration to dorsal lateral aspect of her left hand approximately 5 minutes prior to arrival.  Patient was opening a bottle of cranberry juice where the cap was stuck, patient took a  knife and attempted to take off the, the  knife slipped and cut patient and.  Good hemostasis prior to arrival no medications taken prior to arrival.  Patient otherwise feels well. Denies possibility of foreign body. States the  knife was very clean and not used. PAST MEDICAL / SURGICAL / SOCIAL / FAMILY HISTORY      has a past medical history of Anxiety, Chronic back pain, COPD (chronic obstructive pulmonary disease) ([address] Fibromyalgia, GERD (gastroesophageal reflux disease), HSV-2 (herpes simplex virus 2) infection, Hyperlipidemia, Hypertension, Neuropathy, Obesity, CHARLES (obstructive sleep apnea), Osteoarthritis, Post-traumatic osteoarthritis of left hand, Type II or unspecified type diabetes mellitus without mention of complication, not stated as uncontrolled, Uncontrolled type 2 diabetes mellitus without complication, without long-term current use of insulin, and Urinary incontinence. Reviewed     has a past surgical history that includes Tonsillectomy and adenoidectomy and Tubal ligation.   Reviewed    Social History     Socioeconomic History    Marital status: Single     Spouse name: Not on file    Number of children: Not on file    Years of education: Not on file    Highest education level: Not on file   Occupational History    Not on file   Tobacco Use    Smoking status: Current Every Day Smoker     Packs/day: 1.00     Years: 42.00     Pack years: 42.00     Types: Cigarettes     Start date: 1979    Smokeless tobacco: Never Used   Substance and Sexual Activity    Alcohol use: Not Currently     Alcohol/week: 0.8 - 1.7 standard drinks     Types: 1 - 2 Standard drinks or equivalent per week     Comment: socially    Drug use: No    Sexual activity: Yes     Birth control/protection: Condom   Other Topics Concern    Not on file   Social History Narrative    Not on file     Social Determinants of Health     Financial Resource Strain: Low Risk     Difficulty of Paying Living Expenses: Not very hard   Food Insecurity: No Food Insecurity    Worried About Running Out of Food in the Last Year: Never true    Ran Out of Food in the Last Year: Never true   Transportation Needs:     Lack of Transportation (Medical): Not on file    Lack of Transportation (Non-Medical): Not on file   Physical Activity:     Days of Exercise per Week: Not on file    Minutes of Exercise per Session: Not on file   Stress:     Feeling of Stress : Not on file   Social Connections:     Frequency of Communication with Friends and Family: Not on file    Frequency of Social Gatherings with Friends and Family: Not on file    Attends Yazdanism Services: Not on file    Active Member of [address] [place] [address] [employer] or Organizations: Not on file    Attends Club or Organization Meetings: Not on file    Marital Status: Not on file   Intimate Partner Violence:     Fear of Current or Ex-Partner: Not on file    Emotionally Abused: Not on file    Physically Abused: Not on file    Sexually Abused: Not on file   Housing Stability:     Unable to Pay for Housing in the Last Year: Not on file    Number of [place] in the Last Year: Not on file    Unstable Housing in the Last Year: Not on file       Family History   Problem Relation Age of Onset    Diabetes Mother     Hypertension Mother     Cancer Father 76        rectal cancer    Breast Cancer Sister 22        had a double mastectomy    Diabetes Maternal Grandmother     Colon Cancer Neg Hx     Eclampsia Neg Hx     Ovarian Cancer Neg Hx      Labor Neg Hx     Spont Abortions Neg Hx     Stroke Neg Hx        Allergies:  Latex, Vicodin [hydrocodone-acetaminophen], Zocor [simvastatin], and Lisinopril    Home Medications:  Prior to Admission medications    Medication Sig Start Date End Date Taking?  Authorizing Provider   cephALEXin (KEFLEX) 500 MG capsule Take 1 capsule by mouth 4 times daily for 7 days 22 Yes Apurva Waddell MD   ibuprofen (ADVIL;MOTRIN) 800 MG tablet Take 1 tablet by mouth every 8 hours as needed for Pain 4/1/22 4/11/22 Yes Bunny Norton MD   bacitracin 500 UNIT/GM ointment Apply topically 2 times daily. 4/1/22 4/11/22 Yes Bunny Norton MD   carbamide peroxide (DEBROX) 6.5 % otic solution Place 5 drops in ear(s) 2 times daily 3/31/22 4/30/22  Ramon New MD   albuterol sulfate HFA (VENTOLIN HFA) 108 (90 Base) MCG/ACT inhaler Inhale 2 puffs into the lungs 4 times daily as needed for Wheezing 3/11/22   Kin Lazo MD   furosemide (LASIX) 40 MG tablet Take 1 tablet by mouth daily  Patient not taking: Reported on 3/31/2022 3/10/22   Kin Lazo MD   potassium chloride (KLOR-CON M) 10 MEQ extended release tablet Take 1 tablet by mouth daily  Patient not taking: Reported on 3/31/2022 3/10/22   Kin Lazo MD   ferrous sulfate (IRON 325) 325 (65 Fe) MG tablet Take 325 mg by mouth daily 12/22/20   Historical Provider, MD   atorvastatin (LIPITOR) 10 MG tablet Once daily 10/29/21   Kin Lazo MD   EQ ASPIRIN ADULT LOW DOSE 81 MG EC tablet Take 1 tablet by mouth once daily  Patient not taking: Reported on 3/31/2022 7/20/21   Kin Laoz MD   metFORMIN (GLUCOPHAGE) 1000 MG tablet Take 1 tablet by mouth twice daily with food 7/13/21   Kin Lazo MD   traMADol Estefania Loosen) 50 MG tablet  4/26/21   Historical Provider, MD   tiZANidine (ZANAFLEX) 4 MG tablet Take 1 tablet by mouth 2 times daily 2/17/21   Pk Samuels MD   pregabalin (LYRICA) 200 MG capsule TAKE 1 TABLET BY MOUTH NIGHTLY FOR 30 DAYS  Patient taking differently: 300 mg 2 times daily.   11/10/20 10/29/22  Kin Lazo MD   glimepiride (AMARYL) 2 MG tablet Take 1 tablet by mouth every morning  Patient not taking: Reported on 3/10/2022 6/26/20   Kin Lazo MD   blood glucose test strips (TRUE METRIX BLOOD GLUCOSE TEST) strip USE 1 STRIP TO CHECK GLUCOSE TWICE DAILY  Patient not taking: Reported on 3/31/2022 5/20/20   Kin Lazo MD   Lancets MISC USE 1 LANCET TO CHECK GLUCOSE TWICE DAILY  Patient not taking: Reported on 3/31/2022 5/20/20   Martha Kirby MD       REVIEW OF SYSTEMS    (2-9 systems for level 4, 10 or more for level 5)      Review of Systems   Constitutional: Negative for activity change, appetite change and fever. HENT: Negative for congestion, ear pain, rhinorrhea and sore throat. Eyes: Negative for discharge and redness. Respiratory: Negative for cough, choking, shortness of breath and wheezing. Cardiovascular: Negative for chest pain. Gastrointestinal: Negative for constipation, diarrhea and vomiting. Endocrine: Negative for polydipsia and polyuria. Genitourinary: Negative for decreased urine volume, difficulty urinating, dysuria and menstrual problem. Musculoskeletal: Negative for gait problem. Skin: Positive for wound. Negative for rash. Allergic/Immunologic: Negative for food allergies. Neurological: Negative for speech difficulty and headaches. Psychiatric/Behavioral: Negative for behavioral problems. PHYSICAL EXAM   (up to 7 for level 4, 8 or more for level 5)      INITIAL VITALS:   BP (!) 158/91   Pulse 98   Resp 18   Ht 5' 3\" (1.6 m)   Wt 228 lb (103.4 kg)   LMP 06/10/2013   SpO2 97%   BMI 40.39 kg/m²     Physical Exam  Vitals and nursing note reviewed. Constitutional:       General: She is not in acute distress. Appearance: Normal appearance. She is well-developed. She is obese. She is not ill-appearing, toxic-appearing or diaphoretic. Comments: BP (!) 158/91   Pulse 98   Resp 18   Ht 5' 3\" (1.6 m)   Wt 228 lb (103.4 kg)   LMP 06/10/2013   SpO2 97%   BMI 40.39 kg/m²      HENT:      Head: Normocephalic and atraumatic. Right Ear: External ear normal.      Left Ear: External ear normal.   Eyes:      General: No scleral icterus. Right eye: No discharge. Left eye: No discharge. Cardiovascular:      Rate and Rhythm: Normal rate. Pulses: Normal pulses.    Pulmonary: Effort: Pulmonary effort is normal. No respiratory distress. Breath sounds: Normal breath sounds. Musculoskeletal:      Comments: Normal range of motion of hands and fingers neurovascular intact and neurovascularly intact. Able to make a fist and extend and flex all fingers. Normal sensation throughout hand. Normal pulses to bilateral radials. Thumb to all fingers normal   Skin:     General: Skin is warm. Capillary Refill: Capillary refill takes less than 2 seconds. Coloration: Skin is not jaundiced or pale. Findings: Lesion present. No bruising, erythema or rash. Comments: Laceration to dorsal lateral aspect of left hand. 3 cm laceration good hemostasis prior to arrival   Neurological:      General: No focal deficit present. Mental Status: She is alert and oriented to person, place, and time. Mental status is at baseline. Cranial Nerves: No cranial nerve deficit. Motor: No weakness. Gait: Gait normal.                 DIFFERENTIAL  DIAGNOSIS     PLAN (LABS / IMAGING / EKG):  No orders of the defined types were placed in this encounter. MEDICATIONS ORDERED:  Orders Placed This Encounter   Medications    Tetanus-Diphth-Acell Pertussis (BOOSTRIX) injection 0.5 mL    ibuprofen (ADVIL;MOTRIN) tablet 800 mg    cephALEXin (KEFLEX) 500 MG capsule     Sig: Take 1 capsule by mouth 4 times daily for 7 days     Dispense:  28 capsule     Refill:  0    ibuprofen (ADVIL;MOTRIN) 800 MG tablet     Sig: Take 1 tablet by mouth every 8 hours as needed for Pain     Dispense:  20 tablet     Refill:  1    bacitracin 500 UNIT/GM ointment     Sig: Apply topically 2 times daily. Dispense:  14 g     Refill:  0       DDX: Laceration, retained foreign body, tendinous injury, superficial lack, muscular injury, vascular injury, nervous injury    DIAGNOSTIC RESULTS / Savannah Urena / NORMAN   LAB RESULTS:  No results found for this visit on 04/01/22.     IMPRESSION: Superficial laceration left hand dorsal lateral region images added into media neurovascularly and neuromuscularly intact. Repaired with 3 simple interrupted sutures after sterile saline wash with 500 cc and probed and visualized without tenderness or nervous or vascular injury. No retained foreign bodies. Wound injected with 1% lidocaine, total of 5 cc. Patient tolerated procedure well. Wound was dressed with bacitracin and gauze. Return precautions provide to patient including if wound begins to drain pus or blood return to the emergency room soon as possible. Follow-up with PCP or ED within 7 to 10 days for suture removal.  Patient also advised if inability to feel or move her hand and any way to also return to emergency department. Keflex prescription 7 days sent to patient's preferred pharmacy, [facility]. Bacitracin and ibuprofen prescriptions also sent. Tylenol and ibuprofen for pain control as needed. Patient was discharged in clinically hemodynamically stable condition    PROCEDURES:  Lac Repair    Date/Time: 4/1/2022 6:34 PM  Performed by: Keyshawn Dale MD  Authorized by: Cristofer Carty MD     Consent:     Consent obtained:  Verbal    Consent given by:  Patient    Risks discussed:  Infection, pain, poor cosmetic result, poor wound healing, need for additional repair and retained foreign body    Alternatives discussed:  No treatment  Anesthesia (see MAR for exact dosages):      Anesthesia method:  Local infiltration    Local anesthetic:  Lidocaine 1% w/o epi  Laceration details:     Location:  Hand    Hand location:  L hand, dorsum    Length (cm):  3    Depth (mm):  3  Repair type:     Repair type:  Simple  Pre-procedure details:     Preparation:  Patient was prepped and draped in usual sterile fashion  Exploration:     Hemostasis achieved with:  Direct pressure    Wound exploration: wound explored through full range of motion and entire depth of wound probed and visualized Contaminated: no    Treatment:     Area cleansed with:  Saline    Amount of cleaning:  Standard    Irrigation solution:  Sterile saline    Irrigation volume:  500    Irrigation method:  Pressure wash    Visualized foreign bodies/material removed: no    Skin repair:     Repair method:  Sutures    Suture size:  4-0    Suture material:  Nylon    Suture technique:  Simple interrupted    Number of sutures:  3  Approximation:     Approximation:  Close  Post-procedure details:     Dressing:  Antibiotic ointment    Patient tolerance of procedure: Tolerated well, no immediate complications          CONSULTS:  None    CRITICAL CARE:  Please see attending note    FINAL IMPRESSION      1. Laceration of left hand without foreign body, initial encounter          DISPOSITION / PLAN     DISPOSITION Decision To Discharge 04/01/2022 06:23:16 PM      PAT[facility]ED [facility]:  [facility] [address] [facility] [address] [facility] [address]  [phone]    Go to   For hospital follow-up, suture removal 7-10 days    [facility]  [address] [address]  [phone]    For hospital follow-up in 7-10 days if unable to see your PCP for suture removal      DISCHARGE MEDICATIONS:  New Prescriptions    BACITRACIN 500 UNIT/GM OINTMENT    Apply topically 2 times daily.     CEPHALEXIN (KEFLEX) 500 MG CAPSULE    Take 1 capsule by mouth 4 times daily for 7 days    IBUPROFEN (ADVIL;MOTRIN) 800 MG TABLET    Take 1 tablet by mouth every 8 hours as needed for Pain       Felicia Winn MD  Emergency Medicine Resident    (Please note that portions of thisnote were completed with a voice recognition program.  Efforts were made to edit the dictations but occasionally words are mis-transcribed.)       Felicia Winn MD  Resident  04/01/22 2016

## 2022-04-21 ENCOUNTER — OFFICE VISIT (OUTPATIENT)
Dept: INTERNAL MEDICINE | Age: 57
End: 2022-04-21
Payer: COMMERCIAL

## 2022-04-21 VITALS
TEMPERATURE: 98 F | DIASTOLIC BLOOD PRESSURE: 81 MMHG | HEIGHT: 63 IN | HEART RATE: 96 BPM | BODY MASS INDEX: 40.22 KG/M2 | WEIGHT: 227 LBS | OXYGEN SATURATION: 96 % | SYSTOLIC BLOOD PRESSURE: 130 MMHG

## 2022-04-21 DIAGNOSIS — F41.9 ANXIETY: ICD-10-CM

## 2022-04-21 DIAGNOSIS — E66.01 CLASS 2 SEVERE OBESITY DUE TO EXCESS CALORIES WITH SERIOUS COMORBIDITY AND BODY MASS INDEX (BMI) OF 38.0 TO 38.9 IN ADULT (HCC): Primary | ICD-10-CM

## 2022-04-21 PROCEDURE — 3017F COLORECTAL CA SCREEN DOC REV: CPT | Performed by: STUDENT IN AN ORGANIZED HEALTH CARE EDUCATION/TRAINING PROGRAM

## 2022-04-21 PROCEDURE — 4004F PT TOBACCO SCREEN RCVD TLK: CPT | Performed by: STUDENT IN AN ORGANIZED HEALTH CARE EDUCATION/TRAINING PROGRAM

## 2022-04-21 PROCEDURE — G8427 DOCREV CUR MEDS BY ELIG CLIN: HCPCS | Performed by: STUDENT IN AN ORGANIZED HEALTH CARE EDUCATION/TRAINING PROGRAM

## 2022-04-21 PROCEDURE — 99213 OFFICE O/P EST LOW 20 MIN: CPT | Performed by: STUDENT IN AN ORGANIZED HEALTH CARE EDUCATION/TRAINING PROGRAM

## 2022-04-21 PROCEDURE — G8417 CALC BMI ABV UP PARAM F/U: HCPCS | Performed by: STUDENT IN AN ORGANIZED HEALTH CARE EDUCATION/TRAINING PROGRAM

## 2022-04-21 PROCEDURE — 99211 OFF/OP EST MAY X REQ PHY/QHP: CPT | Performed by: INTERNAL MEDICINE

## 2022-04-21 RX ORDER — HYDROXYZINE HYDROCHLORIDE 25 MG/1
25 TABLET, FILM COATED ORAL EVERY 8 HOURS PRN
Qty: 30 TABLET | Refills: 0 | Status: SHIPPED | OUTPATIENT
Start: 2022-04-21 | End: 2022-05-01

## 2022-04-21 RX ORDER — PHENTERMINE HYDROCHLORIDE 15 MG/1
15 CAPSULE ORAL EVERY MORNING
Qty: 30 CAPSULE | Refills: 1 | Status: SHIPPED | OUTPATIENT
Start: 2022-04-21 | End: 2022-05-19 | Stop reason: SDUPTHER

## 2022-04-21 RX ORDER — PREGABALIN 300 MG/1
CAPSULE ORAL
COMMUNITY
Start: 2022-03-28

## 2022-04-21 ASSESSMENT — ENCOUNTER SYMPTOMS
SORE THROAT: 0
BACK PAIN: 0
SHORTNESS OF BREATH: 1
ABDOMINAL PAIN: 0
DIARRHEA: 0
SINUS PRESSURE: 0
COUGH: 1
CHEST TIGHTNESS: 0
PHOTOPHOBIA: 0
RHINORRHEA: 0
NAUSEA: 0
VOMITING: 0

## 2022-04-21 NOTE — PROGRESS NOTES
[facility] [address] [address]  Dept: [phone]  Dept Fax: [phone]    Office Progress/Follow Up Note  Date of patient's visit: 4/21/2022  Patient's Name:  Carlos Alberto Schaefer YOB: 1965            Patient Care Team:  Keith Landers MD as PCP - General (Internal Medicine)  Keith Landers MD as PCP - [facility]  Sofia Sahu MD as Referring Physician (Internal Medicine)  ________________________________________________________________________      Reason for Visit: Routine outpatient follow up  ________________________________________________________________________  Chief Complaint:  Otalgia (pt has not started ear drops due to not being covered by insurance spoke with pharmacy advised pt he will have to get medication OTC ) and Health Maintenance (pt has an eye doctor goes to [address] [place] [address])    ________________________________________________________________________  History of Presenting Illness:  History was obtained from: patient, electronic medical record. Carlos Alberto Schaefer is a 49 y.o. is here for a follow up visit. Ms. Apple Lucas was last seen on 3/31/22 where she was diagnosed with left ear cerumen as well as symptoms concerning for DNS, sinusitis. She was referred to ENT. She is skeptical of several medical therapies and states that she would prefer not to take medications because of side effects. Based on chart review of labs, previous notes and discussion with Ms Apple Lucas, she has slightly improving but still poorly controlled diabetes which she is attempting to control through natural methods and metformin. She has been unable to see Pulmonology because she states that they require Covid 19 testing for spirometry. She states very emphatically that she will not undergo covid 19 swab testing and she will not undergo Covid 19 vaccination either.     She also states that she does not take several medications previously prescribed to her. She states that she intends to take them at some point but hasn't gotten down to it yet. She also states she is moving and has been busy lately. On today's visit, she reports that her ear symptoms have resolved. She did not see ENT yet. She states that her forehead still feels congested, and that she continues to have a lot of nasal discharge. She also reports poor sleep with multiple night time awakenings with the sensation of choking. She reports that she was previously on phentermine and would like to get treatment with that for weight loss. She declines GLP1 agonists and states she doesn't want to be injected. She also reports increased difficulty controlling her moods and reports that lately she gets disproportionately angry for seemingly trivial events. She declines hyper energetic periods or low mood states. She requests Xanax for these episodes of anxiety and agitation. She has a past medical history of   Type 2 DM on Metformin only  Fibromyalgia  Hypertension  Current Smoker 1ppd x 42 years.   COPD  CHARLES - Does not tolerate CPAP    Patient Active Problem List   Diagnosis    Class 2 severe obesity due to excess calories with serious comorbidity and body mass index (BMI) of 38.0 to 38.9 in adult ([address]    Hyperlipidemia    Anxiety    Mucoid cyst of joint    Post-traumatic osteoarthritis of left hand    Essential hypertension    Fibromyalgia    Type 2 diabetes mellitus without complication, without long-term current use of insulin ([facility])    Vitamin D deficiency    CHARLES (obstructive sleep apnea)    Abnormal mammogram of left breast. (8/2020)       Allergies   Allergen Reactions    Latex Hives    Vicodin [Hydrocodone-Acetaminophen] Hives and Itching    Zocor [Simvastatin]     Lisinopril      Cough           Current Outpatient Medications   Medication Sig Dispense Refill    pregabalin (LYRICA) 300 MG capsule TAKE 1 CAPSULE BY MOUTH TWICE DAILY      hydrOXYzine (ATARAX) 25 MG tablet Take 1 tablet by mouth every 8 hours as needed for Anxiety 30 tablet 0    phentermine 15 MG capsule Take 1 capsule by mouth every morning for 60 days.  30 capsule 1    albuterol sulfate HFA (VENTOLIN HFA) 108 (90 Base) MCG/ACT inhaler Inhale 2 puffs into the lungs 4 times daily as needed for Wheezing 54 g 1    ferrous sulfate (IRON 325) 325 (65 Fe) MG tablet Take 325 mg by mouth daily      metFORMIN (GLUCOPHAGE) 1000 MG tablet Take 1 tablet by mouth twice daily with food 180 tablet 3    traMADol (ULTRAM) 50 MG tablet       tiZANidine (ZANAFLEX) 4 MG tablet Take 1 tablet by mouth 2 times daily 60 tablet 0    ibuprofen (ADVIL;MOTRIN) 800 MG tablet Take 1 tablet by mouth every 8 hours as needed for Pain (Patient not taking: Reported on 4/21/2022) 20 tablet 1    furosemide (LASIX) 40 MG tablet Take 1 tablet by mouth daily (Patient not taking: Reported on 3/31/2022) 30 tablet 3    potassium chloride (KLOR-CON M) 10 MEQ extended release tablet Take 1 tablet by mouth daily (Patient not taking: Reported on 3/31/2022) 30 tablet 5    atorvastatin (LIPITOR) 10 MG tablet Once daily (Patient not taking: Reported on 4/21/2022) 30 tablet 5    EQ ASPIRIN ADULT LOW DOSE 81 MG EC tablet Take 1 tablet by mouth once daily (Patient not taking: Reported on 3/31/2022) 90 tablet 1    pregabalin (LYRICA) 200 MG capsule TAKE 1 TABLET BY MOUTH NIGHTLY FOR 30 DAYS (Patient not taking: Reported on 4/21/2022) 30 capsule 1    glimepiride (AMARYL) 2 MG tablet Take 1 tablet by mouth every morning (Patient not taking: Reported on 3/10/2022) 90 tablet 1    blood glucose test strips (TRUE METRIX BLOOD GLUCOSE TEST) strip USE 1 STRIP TO CHECK GLUCOSE TWICE DAILY (Patient not taking: Reported on 3/31/2022) 100 strip 11    Lancets MISC USE 1 LANCET TO CHECK GLUCOSE TWICE DAILY (Patient not taking: Reported on 3/31/2022) 100 each 11     Current Facility-Administered Medications   Medication Dose Route Frequency Provider Last Rate Last Admin    cyanocobalamin injection 1,000 mcg  1,000 mcg IntraMUSCular Once Cooper Burns MD           Social History     Tobacco Use    Smoking status: Current Every Day Smoker     Packs/day: 1.00     Years: 42.00     Pack years: 42.00     Types: Cigarettes     Start date:     Smokeless tobacco: Never Used   Substance Use Topics    Alcohol use: Not Currently     Alcohol/week: 0.8 - 1.7 standard drinks     Types: 1 - 2 Standard drinks or equivalent per week     Comment: socially    Drug use: No       Family History   Problem Relation Age of Onset    Diabetes Mother     Hypertension Mother     Cancer Father 76        rectal cancer    Breast Cancer Sister 22        had a double mastectomy    Diabetes Maternal Grandmother     Colon Cancer Neg Hx     Eclampsia Neg Hx     Ovarian Cancer Neg Hx      Labor Neg Hx     Spont Abortions Neg Hx     Stroke Neg Hx       ________________________________________________________________________  Review of Systems:  Review of Systems   Constitutional: Negative for chills, diaphoresis and fever. HENT: Negative for rhinorrhea, sinus pressure and sore throat. Eyes: Negative for photophobia. Respiratory: Positive for cough and shortness of breath. Negative for chest tightness. Cardiovascular: Negative for chest pain. Gastrointestinal: Negative for abdominal pain, diarrhea, nausea and vomiting. Genitourinary: Negative for dysuria, frequency and urgency. Musculoskeletal: Negative for back pain and neck stiffness. Skin: Negative for rash. Neurological: Negative for seizures and speech difficulty. Psychiatric/Behavioral: Positive for sleep disturbance. Negative for agitation, confusion and decreased concentration.      ________________________________________________________________________  Physical Exam:  Vitals:    22 1542   BP: 130/81   Site: Right Upper Arm   Position: Sitting   Cuff Size: Large Adult   Pulse: 96   Temp: 98 °F (36.7 °C)   SpO2: 96%   Weight: 227 lb (103 kg)   Height: 5' 3\" (1.6 m)     BP Readings from Last 3 Encounters:   04/21/22 130/81   04/01/22 (!) 158/91   03/31/22 129/80     Physical Exam -  Constitutional:  Alert, cooperative and no distress. Mental Status:  Oriented to person, place and time and normal affect. Lungs:  Bilateral air entry present, lung fields clear. Normal effort. Heart:  Regular rate and rhythm, no murmur. Abdomen:  Soft, nontender, nondistended, normal bowel sounds. Extremities:  No edema, redness, tenderness in the calves. Skin:  Warm, dry, no gross lesions or rashes.  ________________________________________________________________________  Diagnostic findings:  CBC:  Lab Results   Component Value Date    WBC 11.3 12/02/2020    HGB 13.2 12/02/2020     12/02/2020       BMP:    Lab Results   Component Value Date     12/02/2020    K 4.3 12/02/2020     12/02/2020    CO2 21 12/02/2020    BUN 11 12/02/2020    CREATININE 0.81 12/02/2020    GLUCOSE 308 12/02/2020    GLUCOSE 201 02/17/2012       HEMOGLOBIN A1C:   Lab Results   Component Value Date    LABA1C 9.8 03/10/2022       FASTING LIPID PANEL:  Lab Results   Component Value Date    CHOL 175 07/23/2020    HDL 34 (L) 12/02/2020    TRIG 267 (H) 07/23/2020     ________________________________________________________________________  Health Maintenance:  Health Maintenance Due   Topic Date Due    COVID-19 Vaccine (1) Never done    Pneumococcal 0-64 years Vaccine (2 - PCV) 07/14/2018    Lipids  12/02/2021    Potassium  12/02/2021    Creatinine  12/02/2021    Diabetic retinal exam  04/13/2022    Diabetic microalbuminuria test  05/06/2022     Labs pending  ________________________________________________________________________  Assessment and Plan:  Nicole Jason was seen today for otalgia and health maintenance. Diagnoses and all orders for this visit:    1.  Class 2 severe obesity due to excess calories with serious comorbidity and body mass index (BMI) of 38.0 to 38.9 in adult (HCC)  - phentermine 15 MG capsule; Take 1 capsule by mouth every morning for 60 days. Dispense: 30 capsule; Refill: 1    2. Anxiety  - hydrOXYzine (ATARAX) 25 MG tablet; Take 1 tablet by mouth every 8 hours as needed for Anxiety  Dispense: 30 tablet; Refill: 0      ________________________________________________________________________  Follow up and instructions:  Return in about 3 months (around 7/21/2022). · Juliette received counseling on the following healthy behaviors: medication adherence and tobacco cessation    · Discussed use, benefit, and side effects of prescribed medications. Barriers to medication compliance addressed. All patient questions answered. Pt voiced understanding. · Patient given educational materials - see patient instructions    Gulshan Chavira MD  PGY-3, Internal Medicine Resident  [facility]         4/21/2022, 5:10 PM      This note is created with the assistance of a speech-recognition program. While intending to generate a document that actually reflects the content of the visit, the document can still have some mistakes which may not have been identified and corrected by editing.

## 2022-04-22 NOTE — PROGRESS NOTES
Attending Physician Statement  I have discussed the care of Lan Antoine  , including pertinent history and exam findings,  with the resident. I have reviewed the key elements of all parts of the encounter with the resident. I agree with the assessment, plan and orders as documented by the resident.   (GE Modifier)

## 2022-05-19 ENCOUNTER — OFFICE VISIT (OUTPATIENT)
Dept: INTERNAL MEDICINE | Age: 57
End: 2022-05-19
Payer: COMMERCIAL

## 2022-05-19 VITALS
DIASTOLIC BLOOD PRESSURE: 86 MMHG | SYSTOLIC BLOOD PRESSURE: 138 MMHG | WEIGHT: 223 LBS | HEART RATE: 75 BPM | BODY MASS INDEX: 39.51 KG/M2 | TEMPERATURE: 97 F | HEIGHT: 63 IN | OXYGEN SATURATION: 97 %

## 2022-05-19 DIAGNOSIS — E66.01 CLASS 2 SEVERE OBESITY DUE TO EXCESS CALORIES WITH SERIOUS COMORBIDITY AND BODY MASS INDEX (BMI) OF 38.0 TO 38.9 IN ADULT (HCC): Primary | ICD-10-CM

## 2022-05-19 DIAGNOSIS — E11.9 TYPE 2 DIABETES MELLITUS WITHOUT COMPLICATION, WITHOUT LONG-TERM CURRENT USE OF INSULIN (HCC): ICD-10-CM

## 2022-05-19 DIAGNOSIS — M79.7 FIBROMYALGIA: ICD-10-CM

## 2022-05-19 PROCEDURE — G8417 CALC BMI ABV UP PARAM F/U: HCPCS | Performed by: STUDENT IN AN ORGANIZED HEALTH CARE EDUCATION/TRAINING PROGRAM

## 2022-05-19 PROCEDURE — 99213 OFFICE O/P EST LOW 20 MIN: CPT | Performed by: STUDENT IN AN ORGANIZED HEALTH CARE EDUCATION/TRAINING PROGRAM

## 2022-05-19 PROCEDURE — 3017F COLORECTAL CA SCREEN DOC REV: CPT | Performed by: STUDENT IN AN ORGANIZED HEALTH CARE EDUCATION/TRAINING PROGRAM

## 2022-05-19 PROCEDURE — G8427 DOCREV CUR MEDS BY ELIG CLIN: HCPCS | Performed by: STUDENT IN AN ORGANIZED HEALTH CARE EDUCATION/TRAINING PROGRAM

## 2022-05-19 PROCEDURE — 4004F PT TOBACCO SCREEN RCVD TLK: CPT | Performed by: STUDENT IN AN ORGANIZED HEALTH CARE EDUCATION/TRAINING PROGRAM

## 2022-05-19 PROCEDURE — 2022F DILAT RTA XM EVC RTNOPTHY: CPT | Performed by: STUDENT IN AN ORGANIZED HEALTH CARE EDUCATION/TRAINING PROGRAM

## 2022-05-19 PROCEDURE — 3046F HEMOGLOBIN A1C LEVEL >9.0%: CPT | Performed by: STUDENT IN AN ORGANIZED HEALTH CARE EDUCATION/TRAINING PROGRAM

## 2022-05-19 RX ORDER — PHENTERMINE HYDROCHLORIDE 30 MG/1
30 CAPSULE ORAL EVERY MORNING
Qty: 30 CAPSULE | Refills: 0 | Status: SHIPPED | OUTPATIENT
Start: 2022-05-19 | End: 2022-06-22

## 2022-05-19 ASSESSMENT — ENCOUNTER SYMPTOMS
BACK PAIN: 0
PHOTOPHOBIA: 0
NAUSEA: 0
SORE THROAT: 0
CHEST TIGHTNESS: 0
VOMITING: 0
ABDOMINAL PAIN: 0
COUGH: 1
RHINORRHEA: 0
SINUS PRESSURE: 0
SHORTNESS OF BREATH: 0
DIARRHEA: 0

## 2022-05-19 NOTE — PROGRESS NOTES
Attending Physician Statement  I have discussed the care of Michelle Matute  , including pertinent history and exam findings,  with the resident. I have reviewed the key elements of all parts of the encounter with the resident. I agree with the assessment, plan and orders as documented by the resident. (GE Modifier)    On phentermine, only took for a month last year  Restarted 1 month ago on patient request, dose increased for 2 more months  Aware that patient will not get the med for more than 12 weeks in total  Check weight regularly    Not complaint with any of her meds, does not want any workup for diabetes. Poor compalince    Wants ent referral for chronic sinus issues.     Marisol Gibson MD  [place] , [address]   5/95/0104,8:25 PM

## 2022-05-19 NOTE — PROGRESS NOTES
[facility] [address] [address]  Dept: [phone]  Dept Fax: [phone]    Office Progress/Follow Up Note  Date of patient's visit: 5/19/2022  Patient's Name:  Alvaro Michelle YOB: 1965            Patient Care Team:  Alejandro Gardner MD as PCP - General (Internal Medicine)  Alejandro Gardner MD as PCP - [facility]  Juanito Oakes MD as Referring Physician (Internal Medicine)  ________________________________________________________________________      Reason for Visit: Routine outpatient follow up  ________________________________________________________________________  Chief Complaint:  Medication Refill (adipex refill )  ________________________________________________________________________  History of Presenting Illness:  History was obtained from: patient, electronic medical record. Alvaro Michelle is a 49 y.o. is here for a follow up visit. She was recently started on Adipex. She continues to take it and has lost 4 lbs in the last one month. She reports no palpitations, anxiety or sleep issues recently. She refuses to get tested for Covid-19 which she says is a requirement to get in with pulmonology. I am not certain she contacted them again but I asked her to do that. She is not taking any medications except for Lyrica, tramadol, phentermine and tizanidine. She refuses to take any other medications. She also complaints of difficulty breathing through the nose and states she has been breathing through the mouth for years. She reports significant nasal discharge that is worse in the mornings as well as bilateral frontal headaches. She reports episodes in the past where she was told she had fluid in her ear. She states that she normally cleans her ears with water and her finger during her showers. She has significant past medical history of Type 2 DM, Fibromyalgia and Hypertension.     Patient MG tablet Take 1 tablet by mouth every morning 90 tablet 1    blood glucose test strips (TRUE METRIX BLOOD GLUCOSE TEST) strip USE 1 STRIP TO CHECK GLUCOSE TWICE DAILY 100 strip 11    Lancets MISC USE 1 LANCET TO CHECK GLUCOSE TWICE DAILY 100 each 11     Current Facility-Administered Medications   Medication Dose Route Frequency Provider Last Rate Last Admin    cyanocobalamin injection 1,000 mcg  1,000 mcg IntraMUSCular Once Kin Lazo MD           Social History     Tobacco Use    Smoking status: Current Every Day Smoker     Packs/day: 1.00     Years: 42.00     Pack years: 42.00     Types: Cigarettes     Start date:     Smokeless tobacco: Never Used   Substance Use Topics    Alcohol use: Not Currently     Alcohol/week: 0.8 - 1.7 standard drinks     Types: 1 - 2 Standard drinks or equivalent per week     Comment: socially    Drug use: No       Family History   Problem Relation Age of Onset    Diabetes Mother     Hypertension Mother     Cancer Father 76        rectal cancer    Breast Cancer Sister 22        had a double mastectomy    Diabetes Maternal Grandmother     Colon Cancer Neg Hx     Eclampsia Neg Hx     Ovarian Cancer Neg Hx      Labor Neg Hx     Spont Abortions Neg Hx     Stroke Neg Hx     ________________________________________________________________________  Review of Systems:  Review of Systems   Constitutional: Negative for chills, diaphoresis and fever. HENT: Negative for rhinorrhea, sinus pressure and sore throat. Eyes: Negative for photophobia. Respiratory: Positive for cough. Negative for chest tightness and shortness of breath. Cardiovascular: Negative for chest pain. Gastrointestinal: Negative for abdominal pain, diarrhea, nausea and vomiting. Genitourinary: Negative for dysuria, frequency and urgency. Musculoskeletal: Negative for back pain and neck stiffness. Skin: Negative for rash.    Neurological: Negative for seizures and speech 12/02/2021    Diabetic retinal exam  04/13/2022    Diabetic microalbuminuria test  05/06/2022    A1C test (Diabetic or Prediabetic)  06/10/2022     ________________________________________________________________________  Assessment and Plan:  Lia Conner was seen in the office today for Medication Refill (adipex refill )    Class 2 severe obesity due to excess calories with serious comorbidity and body mass index (BMI) of 38.0 to 38.9 in adult ([address]  -     phentermine 30 MG capsule  Type 2 diabetes mellitus without complication, without long-term current use of insulin ([address]  - Continues to not take any medications for it. Fibromyalgia  - On lyrica, tizanidine and tramadol.  ________________________________________________________________________  Follow up and instructions:  Return in about 3 weeks (around 6/9/2022). · Juliette received counseling on the following healthy behaviors: exercise    · Discussed use, benefit, and side effects of prescribed medications. Barriers to medication compliance addressed. All patient questions answered. Pt voiced understanding. · Patient given educational materials - see patient instructions    Jana Yu MD  PGY-3, Internal Medicine Resident  [facility]         5/19/2022, 4:00 PM    This note is created with the assistance of a speech-recognition program. While intending to generate a document that actually reflects the content of the visit, the document can still have some mistakes which may not have been identified and corrected by editing.

## 2022-06-01 ENCOUNTER — TELEPHONE (OUTPATIENT)
Dept: INTERNAL MEDICINE | Age: 57
End: 2022-06-01

## 2022-06-02 ENCOUNTER — OFFICE VISIT (OUTPATIENT)
Dept: INTERNAL MEDICINE | Age: 57
End: 2022-06-02
Payer: COMMERCIAL

## 2022-06-02 ENCOUNTER — HOSPITAL ENCOUNTER (OUTPATIENT)
Age: 57
Setting detail: SPECIMEN
Discharge: HOME | End: 2022-06-02

## 2022-06-02 VITALS
HEART RATE: 86 BPM | DIASTOLIC BLOOD PRESSURE: 87 MMHG | WEIGHT: 225 LBS | HEIGHT: 63 IN | OXYGEN SATURATION: 97 % | TEMPERATURE: 97.2 F | SYSTOLIC BLOOD PRESSURE: 135 MMHG | BODY MASS INDEX: 39.87 KG/M2

## 2022-06-02 DIAGNOSIS — E66.01 CLASS 2 SEVERE OBESITY DUE TO EXCESS CALORIES WITH SERIOUS COMORBIDITY AND BODY MASS INDEX (BMI) OF 38.0 TO 38.9 IN ADULT (HCC): ICD-10-CM

## 2022-06-02 DIAGNOSIS — Z00.00 ENCOUNTER FOR WELL ADULT EXAM WITHOUT ABNORMAL FINDINGS: ICD-10-CM

## 2022-06-02 DIAGNOSIS — E11.9 TYPE 2 DIABETES MELLITUS WITHOUT COMPLICATION, WITHOUT LONG-TERM CURRENT USE OF INSULIN (HCC): ICD-10-CM

## 2022-06-02 DIAGNOSIS — Z00.00 ANNUAL PHYSICAL EXAM: Primary | ICD-10-CM

## 2022-06-02 DIAGNOSIS — Z87.891 PERSONAL HISTORY OF TOBACCO USE, PRESENTING HAZARDS TO HEALTH: ICD-10-CM

## 2022-06-02 PROCEDURE — 99396 PREV VISIT EST AGE 40-64: CPT | Performed by: STUDENT IN AN ORGANIZED HEALTH CARE EDUCATION/TRAINING PROGRAM

## 2022-06-02 PROCEDURE — G0447 BEHAVIOR COUNSEL OBESITY 15M: HCPCS | Performed by: INTERNAL MEDICINE

## 2022-06-02 ASSESSMENT — ENCOUNTER SYMPTOMS
NAUSEA: 0
DIARRHEA: 0
COUGH: 0
SHORTNESS OF BREATH: 0
CHEST TIGHTNESS: 0
SORE THROAT: 0
PHOTOPHOBIA: 0
SINUS PRESSURE: 0
VOMITING: 0
BACK PAIN: 0
RHINORRHEA: 0
ABDOMINAL PAIN: 0

## 2022-06-02 NOTE — PROGRESS NOTES
Well Adult Note  Name: Skyler Alvarez Date: 2022   MRN: [id number] Sex: Female   Age: 49 y.o. Ethnicity:  / Rehana Dehiginio   : 1965 Race: Other      Alisha Redman is here for well adult exam.  Victoria Gibson  She was recently started on Adipex. She continues to take it and has lost 4 lbs in the last one month. She reports no palpitations, anxiety or sleep issues recently.     She refuses to get tested for Covid-19 which she says is a requirement to get in with pulmonology. I am not certain she contacted them again but I asked her to do that.     She is not taking any medications except for Lyrica, tramadol, phentermine and tizanidine. She refuses to take any other medications. She also complaints of difficulty breathing through the nose and states she has been breathing through the mouth for years. She reports significant nasal discharge that is worse in the mornings as well as bilateral frontal headaches. She reports episodes in the past where she was told she had fluid in her ear. She states that she normally cleans her ears with water and her finger during her showers.     She is skeptical of several medical therapies and states that she would prefer not to take medications because of side effects. Based on chart review of labs, previous notes and discussion with Ms Darin Obrien, she has slightly improving but still poorly controlled diabetes which she is attempting to control through natural methods and metformin. She has been unable to see Pulmonology because she states that they require Covid 19 testing for spirometry. She states very emphatically that she will not undergo covid 19 swab testing and she will not undergo Covid 19 vaccination either.     She declines GLP1 agonists and states she doesn't want to be injected. She also reports increased difficulty controlling her moods and reports that lately she gets disproportionately angry for seemingly trivial events.  She declines hyper energetic periods or low mood states.     She has a past medical history of   Type 2 DM on Metformin only  Fibromyalgia - follows with Dr Rosalie Boyer  Hypertension - not on any medication  Current Smoker 1ppd x 42 years. COPD  CHARLES - Does not tolerate CPAP    Review of Systems   Constitutional: Negative for chills, diaphoresis and fever. HENT: Negative for rhinorrhea, sinus pressure and sore throat. Eyes: Negative for photophobia. Respiratory: Negative for cough, chest tightness and shortness of breath. Cardiovascular: Negative for chest pain. Gastrointestinal: Negative for abdominal pain, diarrhea, nausea and vomiting. Genitourinary: Negative for dysuria, frequency and urgency. Musculoskeletal: Negative for back pain and neck stiffness. Skin: Negative for rash. Neurological: Negative for seizures and speech difficulty. Psychiatric/Behavioral: Negative for agitation, confusion and decreased concentration. Allergies   Allergen Reactions    Latex Hives    Vicodin [Hydrocodone-Acetaminophen] Hives and Itching    Zocor [Simvastatin]     Lisinopril      Cough           Prior to Visit Medications    Medication Sig Taking? Authorizing Provider   phentermine 30 MG capsule Take 1 capsule by mouth every morning for 30 days.  Yes Fiordaliza Smith MD   pregabalin (LYRICA) 300 MG capsule TAKE 1 CAPSULE BY MOUTH TWICE DAILY Yes Historical Provider, MD   ibuprofen (ADVIL;MOTRIN) 800 MG tablet Take 1 tablet by mouth every 8 hours as needed for Pain Yes Ebony Severino MD   albuterol sulfate HFA (VENTOLIN HFA) 108 (90 Base) MCG/ACT inhaler Inhale 2 puffs into the lungs 4 times daily as needed for Wheezing Yes Raman Hallman MD   furosemide (LASIX) 40 MG tablet Take 1 tablet by mouth daily Yes Raman Hallman MD   potassium chloride (KLOR-CON M) 10 MEQ extended release tablet Take 1 tablet by mouth daily Yes Raman Hallman MD   ferrous sulfate (IRON 325) 325 (65 Fe) MG tablet Take 325 mg by mouth daily Yes Historical Provider, MD   atorvastatin (LIPITOR) 10 MG tablet Once daily Yes Samuel Waddell MD   EQ ASPIRIN ADULT LOW DOSE 81 MG EC tablet Take 1 tablet by mouth once daily Yes Samuel Waddell MD   metFORMIN (GLUCOPHAGE) 1000 MG tablet Take 1 tablet by mouth twice daily with food Yes Samuel Waddell MD   traMADol (ULTRAM) 50 MG tablet  Yes Historical Provider, MD   tiZANidine (ZANAFLEX) 4 MG tablet Take 1 tablet by mouth 2 times daily Yes Joyce Castillo MD   pregabalin (LYRICA) 200 MG capsule TAKE 1 TABLET BY MOUTH NIGHTLY FOR 30 DAYS Yes Samuel Waddell MD   glimepiride (AMARYL) 2 MG tablet Take 1 tablet by mouth every morning Yes Samuel Waddell MD   blood glucose test strips (TRUE METRIX BLOOD GLUCOSE TEST) strip USE 1 STRIP TO [facility] Yes Samuel Waddell MD   Lancets MISC USE 1 LANCET TO [facility] Yes Samuel Waddell MD         Past Medical History:   Diagnosis Date    Anxiety 2/23/2017    Chronic back pain     COPD ([facility]ronic obstructive pulmonary disease) ([facility] [address]     Fibromyalgia 3/12/2019    GERD (gastroesophageal reflux disease)     resolved not taking medds    HSV-2 (herpes simplex virus 2) infection 7/2/2013    Hyperlipidemia     Hypertension     Neuropathy     feet    Obesity     CHARLES (obstructive sleep apnea) 2/19/2021    Osteoarthritis     Post-traumatic osteoarthritis of left hand     Type II or unspecified type diabetes mellitus without mention of complication, not stated as uncontrolled     Uncontrolled type 2 diabetes mellitus without complication, without long-term current use of insulin 5/22/2019    Urinary incontinence        Past Surgical History:   Procedure Laterality Date    TONSILLECTOMY AND ADENOIDECTOMY      TUBAL LIGATION           Family History   Problem Relation Age of Onset    Diabetes Mother     Hypertension Mother     Cancer Father 76        rectal cancer    Breast Cancer Sister 22        had a double mastectomy    Diabetes Maternal Grandmother     Colon Cancer Neg Hx     Eclampsia Neg Hx     Ovarian Cancer Neg Hx      Labor Neg Hx     Spont Abortions Neg Hx     Stroke Neg Hx        Social History     Tobacco Use    Smoking status: Current Every Day Smoker     Packs/day: 1.00     Years: 42.00     Pack years: 42.00     Types: Cigarettes     Start date:     Smokeless tobacco: Never Used   Substance Use Topics    Alcohol use: Not Currently     Alcohol/week: 0.8 - 1.7 standard drinks     Types: 1 - 2 Standard drinks or equivalent per week     Comment: socially    Drug use: No       Objective   /87 (Site: Left Upper Arm, Position: Sitting, Cuff Size: Large Adult)   Pulse 86   Temp 97.2 °F (36.2 °C)   Ht 5' 3\" (1.6 m)   Wt 225 lb (102.1 kg)   LMP 06/10/2013   SpO2 97%   BMI 39.86 kg/m²   Wt Readings from Last 3 Encounters:   22 225 lb (102.1 kg)   22 223 lb (101.2 kg)   22 227 lb (103 kg)     There were no vitals filed for this visit. Physical Exam -  Constitutional:  Alert, cooperative and no distress. Mental Status:  Oriented to person, place and time and normal affect. Lungs:  Bilateral air entry present, lung fields clear. Normal effort. Heart:  Regular rate and rhythm, no murmur. Abdomen:  Soft, nontender, nondistended, normal bowel sounds. Extremities:  No edema, redness, tenderness in the calves. Skin:  Warm, dry, no gross lesions or rashes. Assessment   Plan   1. Annual physical exam  2. Type 2 diabetes mellitus without complication, without long-term current use of insulin ([address]  -     [place] Behavior  obesity 15m []  3. Class 2 severe obesity due to excess calories with serious comorbidity and body mass index (BMI) of 38.0 to 38.9 in adult ([address]  -     [place] Behavior  obesity 15m []  4. Personal history of tobacco use, presenting hazards to health  -     [place] TOBACCO USE CESSATION INTENSIVE >10 MINUTES [13535]  5.  Encounter for well adult exam without abnormal findings  -     [place] Behavior  obesity 15m []  -     [place] TOBACCO USE CESSATION INTENSIVE >10 MINUTES [77154]         Personalized Preventive Plan   Current Health Maintenance Status  Immunization History   Administered Date(s) Administered    PPD Test 10/19/2016, 11/01/2016, 11/09/2016, 03/29/2019    Pneumococcal Polysaccharide ([id number]) 07/14/2017    Tdap (Boostrix, Adacel) 11/30/2017        Health Maintenance   Topic Date Due    COVID-19 Vaccine (1) Never done    Low dose CT lung screening  Never done    Pneumococcal 0-64 years Vaccine (2 - PCV) 07/14/2018    Lipids  12/02/2021    Diabetic retinal exam  04/13/2022    Diabetic microalbuminuria test  05/06/2022    A1C test (Diabetic or Prediabetic)  06/10/2022    Hepatitis B vaccine (1 of 3 - Risk 3-dose series) 10/18/2022 (Originally 6/28/1984)    Shingles vaccine (1 of 2) 10/29/2022 (Originally 6/28/2017)    Diabetic foot exam  06/17/2022    Breast cancer screen  08/16/2022    Flu vaccine (Season Ended) 09/01/2022    Depression Monitoring  03/10/2023    Colorectal Cancer Screen  08/19/2023    Cervical cancer screen  08/17/2026    DTaP/Tdap/Td vaccine (2 - Td or Tdap) 11/30/2027    Hepatitis C screen  Completed    HIV screen  Completed    Hepatitis A vaccine  Aged Out    Hib vaccine  Aged Out    Meningococcal (ACWY) vaccine  Aged Out     Recommendations for PunchTab Due: see orders and patient instructions/AVS.    Return in about 6 months (around 12/2/2022) for HTN, DM2. Obesity Counseling: Assessed behavioral health risks and factors affecting choice of behavior. Suggested weight control approaches, including dietary changes behavioral modification and follow up plan. Provided educational and support documentation. Time spent (minutes): 10 minutes    Cardiovascular Disease Risk Counseling: Assessed the patient's risk to develop cardiovascular disease and reviewed main risk factors.    Reviewed steps to reduce disease risk including:   · Quitting tobacco use, reducing amount smoked  · Making healthy food choices  · Being physically active and gradualy increasing activity levels   · Reduce weight and determine a healthy BMI goal  · Monitor blood pressure and treat if higher than 140/90 mmHg  · Maintain blood total cholesterol levels under 5 mmol/l or 190 mg/dl  · Maintain LDL cholesterol levels under 3.0 mmol/l or 115 mg/dl   · Control blood glucose levels  · Consider taking aspirin (75 mg daily), once blood pressure is controlled   Provided a follow up plan. Time spent (minutes): 10 minutes    LDCT Screening: Discussed with patient the benefits and harms of screening, follow-up diagnostic testing, over-diagnosis, false positive rate, and total radiation exposure. Counseled on the importance of adherence to annual lung cancer LDCT screening, impact of comorbidities, ability and willingness to undergo diagnosis and treatment. Counseled on the importance of maintaining cigarette smoking abstinence and cessation. Patient has a history of heavy tobacco use of over 30 pack years. Patient does not present signs or symptoms of lung cancer. Tobacco Cessation Counseling: Patient advised about behavior change, including information about personal health harms, usage of appropriate cessation measures and benefits of cessation.   Time spent (minutes): 10 minutes    Agueda Bowling MD  PGY-3, Internal Medicine Resident  [address] [facility]         6/2/2022, 5:06 PM

## 2022-06-02 NOTE — PROGRESS NOTES
Attending Physician Statement  I have discussed the care of Tim Mcintosh, including pertinent history and exam findings,  with the resident. I have reviewed the key elements of all parts of the encounter with the resident. I agree with the assessment, plan and orders as documented by the resident.   (GE Modifier)

## 2022-06-02 NOTE — PATIENT INSTRUCTIONS
Body Mass Index: Care Instructions  Your Care Instructions     Body mass index (BMI) can help you see if your weight is raising your risk for health problems. It uses a formula to compare how much you weigh with how tallyou are.  A BMI lower than 18.5 is considered underweight.  A BMI between 18.5 and 24.9 is considered healthy.  A BMI between 25 and 29.9 is considered overweight. A BMI of 30 or higher is considered obese. If your BMI is in the normal range, it means that you have a lower risk for weight-related health problems. If your BMI is in the overweight or obese range, you may be at increased risk for weight-related health problems, such as high blood pressure, heart disease, stroke, arthritis or joint pain, and diabetes. If your BMI is in the underweight range, you may be at increased risk for health problems such as fatigue, lower protection (immunity) againstillness, muscle loss, bone loss, hair loss, and hormone problems. BMI is just one measure of your risk for weight-related health problems. You may be at higher risk for health problems if you are not active, you eat anunhealthy diet, or you drink too much alcohol or use tobacco products. Follow-up care is a key part of your treatment and safety. Be sure to make and go to all appointments, and call your doctor if you are having problems. It's also a good idea to know your test results and keep alist of the medicines you take. How can you care for yourself at home?  Practice healthy eating habits. This includes eating plenty of fruits, vegetables, whole grains, lean protein, and low-fat dairy.  If your doctor recommends it, get more exercise. Walking is a good choice. Bit by bit, increase the amount you walk every day. Try for at least 30 minutes on most days of the week.  Do not smoke. Smoking can increase your risk for health problems. If you need help quitting, talk to your doctor about stop-smoking programs and medicines. These can increase your chances of quitting for good.  Limit alcohol to 2 drinks a day for men and 1 drink a day for women. Too much alcohol can cause health problems. If you have a BMI higher than 25   Your doctor may do other tests to check your risk for weight-related health problems. This may include measuring the distance around your waist. A waist measurement of more than 40 inches in men or 35 inches in women can increase the risk of weight-related health problems.  Talk with your doctor about steps you can take to stay healthy or improve your health. You may need to make lifestyle changes to lose weight and stay healthy, such as changing your diet and getting regular exercise. If you have a BMI lower than 18.5   Your doctor may do other tests to check your risk for health problems.  Talk with your doctor about steps you can take to stay healthy or improve your health. You may need to make lifestyle changes to gain or maintain weight and stay healthy, such as getting more healthy foods in your diet and doing exercises to build muscle. Where can you learn more? Go to https://AWCC Holdingsnicolás.121nexus. org and sign in to your [employer] account. Enter S176 in the [employer] box to learn more about \"Body Mass Index: Care Instructions. \"     If you do not have an account, please click on the \"Sign Up Now\" link. Current as of: December 27, 2021               Content Version: 13.2  © 2006-2022 Healthwise, Incorporated. Care instructions adapted under license by [facility] ([facility]). If you have questions about a medical condition or this instruction, always ask your healthcare professional. [place] [address] any warranty or liability for your use of this information. DASH Diet: Care Instructions  Your Care Instructions     The DASH diet is an eating plan that can help lower your blood pressure. DASH stands for Dietary Approaches to Stop Hypertension.  Hypertension is high bloodpressure. The DASH diet focuses on eating foods that are high in calcium, potassium, and magnesium. These nutrients can lower blood pressure. The foods that are highest in these nutrients are fruits, vegetables, low-fat dairy products, nuts, seeds, and legumes. But taking calcium, potassium, and magnesium supplements instead of eating foods that are high in those nutrients does not have the same effect. The DASH diet also includes whole grains, fish, and poultry. The DASH diet is one of several lifestyle changes your doctor may recommend to lower your high blood pressure. Your doctor may also want you to decrease the amount of sodium in your diet. Lowering sodium while following the DASH dietcan lower blood pressure even further than just the DASH diet alone. Follow-up care is a key part of your treatment and safety. Be sure to make and go to all appointments, and call your doctor if you are having problems. It's also a good idea to know your test results and keep alist of the medicines you take. How can you care for yourself at home? Following the DASH diet   Eat 4 to 5 servings of fruit each day. A serving is 1 medium-sized piece of fruit, ½ cup chopped or canned fruit, 1/4 cup dried fruit, or 4 ounces (½ cup) of fruit juice. Choose fruit more often than fruit juice.  Eat 4 to 5 servings of vegetables each day. A serving is 1 cup of lettuce or raw leafy vegetables, ½ cup of chopped or cooked vegetables, or 4 ounces (½ cup) of vegetable juice. Choose vegetables more often than vegetable juice.  Get 2 to 3 servings of low-fat and fat-free dairy each day. A serving is 8 ounces of milk, 1 cup of yogurt, or 1 ½ ounces of cheese.  Eat 6 to 8 servings of grains each day. A serving is 1 slice of bread, 1 ounce of dry cereal, or ½ cup of cooked rice, pasta, or cooked cereal. Try to choose whole-grain products as much as possible.  Limit lean meat, poultry, and fish to 2 servings each day.  A serving is 3 ounces, about the size of a deck of cards.  Eat 4 to 5 servings of nuts, seeds, and legumes (cooked dried beans, lentils, and split peas) each week. A serving is 1/3 cup of nuts, 2 tablespoons of seeds, or ½ cup of cooked beans or peas.  Limit fats and oils to 2 to 3 servings each day. A serving is 1 teaspoon of vegetable oil or 2 tablespoons of salad dressing.  Limit sweets and added sugars to 5 servings or less a week. A serving is 1 tablespoon jelly or jam, ½ cup sorbet, or 1 cup of lemonade.  Eat less than 2,300 milligrams (mg) of sodium a day. If you limit your sodium to 1,500 mg a day, you can lower your blood pressure even more.  Be aware that all of these are the suggested number of servings for people who eat 1,800 to 2,000 calories a day. Your recommended number of servings may be different if you need more or fewer calories. Tips for success   Start small. Do not try to make dramatic changes to your diet all at once. You might feel that you are missing out on your favorite foods and then be more likely to not follow the plan. Make small changes, and stick with them. Once those changes become habit, add a few more changes.  Try some of the following:  ? Make it a goal to eat a fruit or vegetable at every meal and at snacks. This will make it easy to get the recommended amount of fruits and vegetables each day. ? Try yogurt topped with fruit and nuts for a snack or healthy dessert. ? Add lettuce, tomato, cucumber, and onion to sandwiches. ? Combine a ready-made pizza crust with low-fat mozzarella cheese and lots of vegetable toppings. Try using tomatoes, squash, spinach, broccoli, carrots, cauliflower, and onions. ? Have a variety of cut-up vegetables with a low-fat dip as an appetizer instead of chips and dip. ? Sprinkle sunflower seeds or chopped almonds over salads. Or try adding chopped walnuts or almonds to cooked vegetables. ? Try some vegetarian meals using beans and peas. Add garbanzo or kidney beans to salads. Make burritos and tacos with mashed brown beans or black beans. Where can you learn more? Go to https://CeltaxsysjeanethEttain Group Inc..ClickScanShare. org and sign in to your [employer] account. Enter Y306 in the [facility] box to learn more about \"DASH Diet: Care Instructions. \"     If you do not have an account, please click on the \"Sign Up Now\" link. Current as of: January 10, 2022               Content Version: 13.2  © 4723-9075 [employer]. Care instructions adapted under license by [facility] ([facility]). If you have questions about a medical condition or this instruction, always ask your healthcare professional. [address] any warranty or liability for your use of this information. Learning About Cutting Calories  How do calories affect your weight? Food gives your body energy. Energy from the food you eat is measured in calories. This energy keeps your heart beating, your brain active, and yourmuscles working. Your body needs a certain number of calories each day. After your body uses thecalories it needs, it stores extra calories as fat. To lose weight safely, you have to eat fewer calories while eating in a healthyway. How many calories do you need each day? The more active you are, the more calories you need. When you are less active, you need fewer calories. How many calories you need each day also depends onseveral things, including your age and whether you are male or female. Here are some general guidelines for adults:   Less active women and older adults need 1,600 to 2,000 calories each day.  Active women and less active men need 2,000 to 2,400 calories each day.  Active men need 2,400 to 3,000 calories each day. How can you cut calories and eat healthy meals? Whole grains, vegetables and fruits, and dried beans are good lower-caloriefoods. They give you lots of nutrients and fiber. And they fill you up.   Sweets, energy drinks, and soda pop are high in calories. They give you few nutrients and no fiber. Try to limit soda pop, fruit juice, and energy drinks. Drink water instead. Some fats can be part of a healthy diet. But cutting back on fats from highly processed foods like fast foods and many snack foods is a good way to lower the calories in your diet. Also, use smaller amounts of fats like butter, margarine, salad dressing, and mayonnaise. Add fresh garlic, lemon, or herbs toyour meals to add flavor without adding fat. Meats and dairy products can be a big source of hidden fats. Try to choose leanor low-fat versions of these products. Fat-free cookies, candies, chips, and frozen treats can still be high in sugar and calories. Some fat-free foods have more calories than regular ones. Eatfat-free treats in moderation, as you would other foods. If your favorite foods are high in fat, salt, sugar, or calories, limit how often you eat them. Eat smaller servings, or look for healthy substitutes. Fillup on fruits, vegetables, and whole grains. Eating at home   Use meat as a side dish instead of as the main part of your meal.   Try main dishes that use whole wheat pasta, brown rice, dried beans, or vegetables.  Find ways to cook with little or no fat, such as broiling, steaming, or grilling.  Use cooking spray instead of oil. If you use oil, use a monounsaturated oil, such as canola or olive oil.  Trim fat from meats before you cook them.  Drain off fat after you brown the meat or while you roast it.  Chill soups and stews after you cook them. Then skim the fat off the top after it hardens. Eating out   Order foods that are broiled or poached rather than fried or breaded.  Cut back on the amount of butter or margarine that you use on bread.  Order sauces, gravies, and salad dressings on the side, and use only a little.  When you order pasta, choose tomato sauce rather than cream sauce.    Ask for [employer] with your baked potato instead of sour cream, butter, cheese, or kat.  Order meals in a small size instead of upgrading to a large.  Share an entree, or take part of your food home to eat as another meal.   Share appetizers and desserts. Where can you learn more? Go to https://chpepiceweb.healthSwiftPayMD(TM) by [employer]. org and sign in to your [employer] account. Enter P459 in the [employer] box to learn more about \"Learning About Cutting Calories. \"     If you do not have an account, please click on the \"Sign Up Now\" link. Current as of: September 8, 2021               Content Version: 13.2  © 2006-2022 Healthwise, [employer]. Care instructions adapted under license by [facility] ([facility]). If you have questions about a medical condition or this instruction, always ask your healthcare professional. [place] [address] any warranty or liability for your use of this information. Eating Healthy Foods: Care Instructions  Your Care Instructions     Eating healthy foods can help lower your risk for disease. Healthy food gives you energy and keeps your heart strong, your brain active, your musclesworking, and your bones strong. A healthy diet includes a variety of foods from the basic food groups: grains, vegetables, fruits, milk and milk products, and meat and beans. Some people may eat more of their favorite foods from only one food group and, as a result, miss getting the nutrients they need. So, it is important to pay attention not only to what you eat but also to what you are missing from your diet. You caneat a healthy, balanced diet by making a few small changes. Follow-up care is a key part of your treatment and safety. Be sure to make and go to all appointments, and call your doctor if you are having problems. It's also a good idea to know your test results and keep alist of the medicines you take. How can you care for yourself at home?   Look at what you eat   Keep a food diary for a week or two and record everything you eat or drink. Track the number of servings you eat from each food group.  For a balanced diet every day, eat a variety of:  ? 6 or more ounce-equivalents of grains, such as cereals, breads, crackers, rice, or pasta, every day. An ounce-equivalent is 1 slice of bread, 1 cup of ready-to-eat cereal, or ½ cup of cooked rice, cooked pasta, or cooked cereal.  ? 2½ cups of vegetables, especially:  - Dark-green vegetables such as broccoli and spinach.  - Orange vegetables such as carrots and sweet potatoes. - Dry beans (such as brown and kidney beans) and peas (such as lentils). ? 2 cups of fresh, frozen, or canned fruit. A small apple or 1 banana or orange equals 1 cup. ? 3 cups of nonfat or low-fat milk, yogurt, or other milk products. ? 5½ ounces of meat and beans, such as chicken, fish, lean meat, beans, nuts, and seeds. One egg, 1 tablespoon of peanut butter, ½ ounce nuts or seeds, or ¼ cup of cooked beans equals 1 ounce of meat.  Learn how to read food labels for serving sizes and ingredients. Fast-food and convenience-food meals often contain few or no fruits or vegetables. Make sure you eat some fruits and vegetables to make the meal more nutritious.  Look at your food diary. For each food group, add up what you have eaten and then divide the total by the number of days. This will give you an idea of how much you are eating from each food group. See if you can find some ways to change your diet to make it more healthy. Start small   Do not try to make dramatic changes to your diet all at once. You might feel that you are missing out on your favorite foods and then be more likely to fail.  Start slowly, and gradually change your habits. Try some of the following:  ? Use whole wheat bread instead of white bread. ? Use nonfat or low-fat milk instead of whole milk. ? Eat brown rice instead of white rice, and eat whole wheat pasta instead of white-flour pasta.   ? Try Health. If you have questions about a medical condition or this instruction, always ask your healthcare professional. [address] any warranty or liability for your use of this information. Stopping Smoking: Care Instructions  Your Care Instructions     Cigarette smokers crave the nicotine in cigarettes. Giving it up is much harder than simply changing a habit. Your body has to stop craving the nicotine. It is hard to quit, but you can do it. There are many tools that people use to quitsmoking. You may find that combining tools works best for you. There are several steps to quitting. First you get ready to quit. Then you get support to help you. After that, you learn new skills and behaviors to become anonsmoker. For many people, a necessary step is getting and using medicine. Your doctor will help you set up the plan that best meets your needs. You may want to attend a smoking cessation program to help you quit smoking. When you choose a program, look for one that has proven success. Ask your doctor for ideas. You will greatly increase your chances of success if you take medicineas well as get counseling or join a cessation program.  Some of the changes you feel when you first quit tobacco are uncomfortable. Your body will miss the nicotine at first, and you may feel short-tempered and grumpy. You may have trouble sleeping or concentrating. Medicine can help you deal with these symptoms. You may struggle with changing your smoking habits and rituals. The last step is the tricky one: Be prepared for the smoking urge to continue for a time. This is a lot to deal with, but keep at it. You willfeel better. Follow-up care is a key part of your treatment and safety. Be sure to make and go to all appointments, and call your doctor if you are having problems. It's also a good idea to know your test results and keep alist of the medicines you take.   How can you care for yourself at home?   Ask your family, friends, and coworkers for support. You have a better chance of quitting if you have help and support.  Join a support group, such as Nicotine Anonymous, for people who are trying to quit smoking.  Consider signing up for a smoking cessation program, such as the American Lung Association's Freedom from Smoking program.   Get text messaging support. Go to the website at www.smokefree. gov to sign up for the [facility] program.   Set a quit date. Pick your date carefully so that it is not right in the middle of a big deadline or stressful time. Once you quit, do not even take a puff. Get rid of all ashtrays and lighters after your last cigarette. Clean your house and your clothes so that they do not smell of smoke.  Learn how to be a nonsmoker. Think about ways you can avoid those things that make you reach for a cigarette. ? Avoid situations that put you at greatest risk for smoking. For some people, it is hard to have a drink with friends without smoking. For others, they might skip a coffee break with coworkers who smoke. ? Change your daily routine. Take a different route to work or eat a meal in a different place.  Cut down on stress. Calm yourself or release tension by doing an activity you enjoy, such as reading a book, taking a hot bath, or gardening.  Talk to your doctor or pharmacist about nicotine replacement therapy, which replaces the nicotine in your body. You still get nicotine but you do not use tobacco. Nicotine replacement products help you slowly reduce the amount of nicotine you need. These products come in several forms, many of them available over-the-counter:  ? Nicotine patches  ? Nicotine gum and lozenges  ? Nicotine inhaler   Ask your doctor about bupropion (Wellbutrin) or varenicline (Chantix), which are prescription medicines. They do not contain nicotine. They help you by reducing withdrawal symptoms, such as stress and anxiety.    Some people find protein, and low-fat dairy foods. Limit fat, especially saturated fat. Reduce salt in your diet.  Limit alcohol. Have no more than 1 drink a day or 7 drinks a week.  Get at least 30 minutes of exercise on most days of the week. Walking is a good choice. You also may want to do other activities, such as running, swimming, cycling, or playing tennis or team sports.  Reach and stay at a healthy weight. This will lower your risk for many problems, such as obesity, diabetes, heart disease, and high blood pressure.  Do not smoke. Smoking can make health problems worse. If you need help quitting, talk to your doctor about stop-smoking programs and medicines. These can increase your chances of quitting for good.  Care for your mental health. It is easy to get weighed down by worry and stress. Learn strategies to manage stress, like deep breathing and mindfulness, and stay connected with your family and community. If you find you often feel sad or hopeless, talk with your doctor. Treatment can help.  Talk to your doctor about whether you have any risk factors for sexually transmitted infections (STIs). You can help prevent STIs if you wait to have sex with a new partner (or partners) until you've each been tested for STIs. It also helps if you use condoms (male or female condoms) and if you limit your sex partners to one person who only has sex with you. Vaccines are available for some STIs.  If you think you may have a problem with alcohol or drug use, talk to your doctor. This includes prescription medicines (such as amphetamines and opioids) and illegal drugs (such as cocaine and methamphetamine). Your doctor can help you figure out what type of treatment is best for you.  Protect your skin from too much sun. When you're outdoors from 10 a.m. to 4 p.m., stay in the shade or cover up with clothing and a hat with a wide brim. Wear sunglasses that block UV rays.  Even when it's cloudy, put broad-spectrum sunscreen (SPF 30 or higher) on any exposed skin.  See a dentist one or two times a year for checkups and to have your teeth cleaned.  Wear a seat belt in the car. When should you call for help? Watch closely for changes in your health, and be sure to contact your doctor if you have any problems or symptoms that concern you. Where can you learn more? Go to https://CWR MobilitypeRegulatoryBinder.tribr. org and sign in to your [employer] account. Enter K412 in the [facility] box to learn more about \"Well Visit, Women 50 to 72: Care Instructions. \"     If you do not have an account, please click on the \"Sign Up Now\" link. Current as of: October 6, 2021               Content Version: 13.2  © 2006-2022 [employer] ( [employer] ). Care instructions adapted under license by [facility] ([facility]). If you have questions about a medical condition or this instruction, always ask your healthcare professional. [place] [address] any warranty or liability for your use of this information. Heart-Healthy Diet   Sodium, Fat, and Cholesterol Controlled Diet       What Is a Heart Healthy Diet? A heart-healthy diet is one that limits sodium , certain types of fat , and cholesterol . This type of diet is recommended for:   · People with any form of cardiovascular disease (eg, coronary heart disease , peripheral vascular disease , previous heart attack , previous stroke )   · People with risk factors for cardiovascular disease, such as high blood pressure , high cholesterol , or diabetes   · Anyone who wants to lower their risk of developing cardiovascular disease   Sodium    Sodium is a mineral found in many foods. In general, most people consume much more sodium than they need. Diets high in sodium can increase blood pressure and lead to edema (water retention). On a heart-healthy diet, you should consume no more than 2,300 mg (milligrams) of sodium per dayabout the amount in one teaspoon of table salt.  The foods highest in sodium include table salt (about 50% sodium), processed foods, convenience foods, and preserved foods. Cholesterol    Cholesterol is a fat-like, waxy substance in your blood. Our bodies make some cholesterol. It is also found in animal products, with the highest amounts in fatty meat, egg yolks, whole milk, cheese, shellfish, and organ meats. On a heart-healthy diet, you should limit your cholesterol intake to less than 200 mg per day. It is normal and important to have some cholesterol in your bloodstream. But too much cholesterol can cause plaque to build up within your arteries, which can eventually lead to a heart attack or stroke. The two types of cholesterol that are most commonly referred to are:   · Low-density lipoprotein (LDL) cholesterol  Also known as bad cholesterol, this is the cholesterol that tends to build up along your arteries. Bad cholesterol levels are increased by eating fats that are saturated or hydrogenated. Optimal level of this cholesterol is less than 100. Over 130 starts to get risky for heart disease. · High-density lipoprotein (HDL) cholesterol  Also known as good cholesterol, this type of cholesterol actually carries cholesterol away from your arteries and may, therefore, help lower your risk of having a heart attack. You want this level to be high (ideally greater than 60). It is a risk to have a level less than 40. You can raise this good cholesterol by eating olive oil, canola oil, avocados, or nuts. Exercise raises this level, too. Fat    Fat is calorie dense and packs a lot of calories into a small amount of food. Even though fats should be limited due to their high calorie content, not all fats are bad. In fact, some fats are quite healthful. Fat can be broken down into four main types.    · The good-for-you fats are:   ¨ Monounsaturated fat  found in oils such as olive and canola, avocados, and nuts and natural nut butters; can decrease cholesterol levels, while keeping levels of HDL cholesterol high   ¨ Polyunsaturated fat  found in oils such as safflower, sunflower, soybean, corn, and sesame; can decrease total cholesterol and LDL cholesterol   ¨ Omega-3 fatty acids  particularly those found in fatty fish (such as salmon, trout, tuna, mackerel, herring, and sardines); can decrease risk of arrhythmias, decrease triglyceride levels, and slightly lower blood pressure   · The fats that you want to limit are:   ¨ Saturated fat  found in animal products, many fast foods, and a few vegetables; increases total blood cholesterol, including LDL levels   § Animal fats that are saturated include: butter, lard, whole-milk dairy products, meat fat, and poultry skin   § Vegetable fats that are saturated include: hydrogenated shortening, palm oil, coconut oil, cocoa butter   ¨ Hydrogenated or trans fat  found in margarine and vegetable shortening, most shelf stable snack foods, and fried foods; increases LDL and decreases HDL     It is generally recommended that you limit your total fat for the day to less than 30% of your total calories. If you follow an 1800-calorie heart healthy diet, for example, this would mean 60 grams of fat or less per day. Saturated fat and trans fat in your diet raises your blood cholesterol the most, much more than dietary cholesterol does. For this reason, on a heart-healthy diet, less than 7% of your calories should come from saturated fat and ideally 0% from trans fat. On an 1800-calorie diet, this translates into less than 14 grams of saturated fat per day, leaving 46 grams of fat to come from mono- and polyunsaturated fats.    Food Choices on a Heart Healthy Diet   Food Category   Foods Recommended   Foods to Avoid   Grains   Breads and rolls without salted tops Most dry and cooked cereals Unsalted crackers and breadsticks Low-sodium or homemade breadcrumbs or stuffing All rice and pastas   Breads, rolls, and crackers with salted tops High-fat baked goods (eg, muffins, donuts, pastries) Quick breads, self-rising flour, and biscuit mixes Regular bread crumbs Instant hot cereals Commercially prepared rice, pasta, or stuffing mixes   Vegetables   Most fresh, frozen, and low-sodium canned vegetables Low-sodium and salt-free vegetable juices Canned vegetables if unsalted or rinsed   Regular canned vegetables and juices, including sauerkraut and pickled vegetables Frozen vegetables with sauces Commercially prepared potato and vegetable mixes   Fruits   Most fresh, frozen, and canned fruits All fruit juices   Fruits processed with salt or sodium   Milk   Nonfat or low-fat (1%) milk Nonfat or low-fat yogurt Cottage cheese, low-fat ricotta, cheeses labeled as low-fat and low-sodium   Whole milk Reduced-fat (2%) milk Malted and chocolate milk Full fat yogurt Most cheeses (unless low-fat and low salt) Buttermilk (no more than 1 cup per week)   Meats and Beans   Lean cuts of fresh or frozen beef, veal, lamb, or pork (look for the word loin) Fresh or frozen poultry without the skin Fresh or frozen fish and some shellfish Egg whites and egg substitutes (Limit whole eggs to three per week) Tofu Nuts or seeds (unsalted, dry-roasted), low-sodium peanut butter Dried peas, beans, and lentils   Any smoked, cured, salted, or canned meat, fish, or poultry (including kat, chipped beef, cold cuts, hot dogs, sausages, sardines, and anchovies) Poultry skins Breaded and/or fried fish or meats Canned peas, beans, and lentils Salted nuts   Fats and Oils   Olive oil and canola oil Low-sodium, low-fat salad dressings and mayonnaise   Butter, margarine, coconut and palm oils, kat fat   Snacks, Sweets, and Condiments   Low-sodium or unsalted versions of broths, soups, soy sauce, and condiments Pepper, herbs, and spices; vinegar, lemon, or lime juice Low-fat frozen desserts (yogurt, sherbet, fruit bars) Sugar, cocoa powder, honey, syrup, jam, and preserves Low-fat, trans-fat free cookies, cakes, and pies Caio and animal crackers, fig bars, harinder snaps   High-fat desserts Broth, soups, gravies, and sauces, made from instant mixes or other high-sodium ingredients Salted snack foods Canned olives Meat tenderizers, seasoning salt, and most flavored vinegars   Beverages   Low-sodium carbonated beverages Tea and coffee in moderation Soy milk   Commercially softened water   Suggestions   · Make whole grains, fruits, and vegetables the base of your diet. · Choose heart-healthy fats such as canola, olive, and flaxseed oil, and foods high in heart-healthy fats, such as nuts, seeds, soybeans, tofu, and fish. · Eat fish at least twice per week; the fish highest in omega-3 fatty acids and lowest in mercury include salmon, herring, mackerel, sardines, and canned chunk light tuna. If you eat fish less than twice per week or have high triglycerides, talk to your doctor about taking fish oil supplements. · Read food labels. ¨ For products low in fat and cholesterol, look for fat free, low-fat, cholesterol free, saturated fat free, and trans fat freeAlso scan the Nutrition Facts Label, which lists saturated fat, trans fat, and cholesterol amounts. ¨ For products low in sodium, look for sodium free, very low sodium, low sodium, no added salt, and unsalted   · Skip the salt when cooking or at the table; if food needs more flavor, get creative and try out different herbs and spices. Garlic and onion also add substantial flavor to foods. · Trim any visible fat off meat and poultry before cooking, and drain the fat off after king. · Use cooking methods that require little or no added fat, such as grilling, boiling, baking, poaching, broiling, roasting, steaming, stir-frying, and sauting. · Avoid fast food and convenience food. They tend to be high in saturated and trans fat and have a lot of added salt. · Talk to a registered dietitian for individualized diet advice.       Last Reviewed: March 2011 Barbara Calderon MS, MPH, RD   Updated: 3/29/2011     Patient information: Weight loss treatments    INTRODUCTION  Obesity is a major international problem, and Americans are among the heaviest people in the world. The percentage of obese people in the [place] has risen steadily. Many people find that although they initially lose weight by dieting, they quickly regain the weight after the diet ends. Because it so hard to keep weight off over time, it is important to have as much information and support as possible before starting a diet. You are most likely to be successful in losing weight and keeping it off when you believe that your body weight can be controlled. STARTING A WEIGHT LOSS PROGRAM  Some people like to talk to their doctor or nurse to get help choosing the best plan, monitoring progress, and getting advice and support along the way. To know what treatment (or combination of treatments) will work best, determine your body mass index (BMI) and waist circumference (measurement). The BMI is calculated from your height and weight. A person with a BMI between 25 and 29.9 is considered overweight   A person with a BMI of 30 or greater is considered to be obese  A waist circumference greater than 35 inches (88 cm) in women and 40 inches (102 cm) in men increases the risk of obesity-related complications, such as heart disease and diabetes. People who are obese and who have a larger waist size may need more aggressive weight loss treatment than others. Talk to your doctor or nurse for advice. Types of treatment  Based on your measurements and your medical history, your doctor or nurse can determine what combination of weight loss treatments would work best for you. Treatments may include changes in lifestyle, exercise, dieting, and, in some cases, weight loss medicines or weight loss surgery.  Weight loss surgery, also called bariatric surgery, is reserved for people with severe obesity who have not responded to other weight loss treatments. SETTING A WEIGHT LOSS GOAL  It is important to set a realistic weight loss goal. Your first goal should be to avoid gaining more weight and staying at your current weight (or within 5 percent). Many people have a \"dream\" weight that is difficult or impossible to achieve. People at high risk of developing diabetes who are able to lose 5 percent of their body weight and maintain this weight will reduce their risk of developing diabetes by about 50 percent and reduce their blood pressure. This is a success. Losing more than 15 percent of your body weight and staying at this weight is an extremely good result, even if you never reach your \"dream\" or \"ideal\" weight. LIFESTYLE CHANGES  Programs that help you to change your lifestyle are usually run by psychologists or other professionals. The goals of lifestyle changes are to help you change your eating habits, become more active, and be more aware of how much you eat and exercise, helping you to make healthier choices. This type of treatment can be broken down into three steps: The triggers that make you want to eat   Eating   What happens after you eat  Triggers to eat  Determining what triggers you to eat involves figuring out what foods you eat and where and when you eat. To figure out what triggers you to eat, keep a record for a few days of everything you eat, the places where you eat, how often you eat, and the emotions you were feeling when you ate. For some people, the trigger is related to a certain time of day or night. For others, the trigger is related to a certain place, like sitting at a desk working. Eating  You can change your eating habits by breaking the chain of events between the trigger for eating and eating itself. There are many ways to do this.  For instance, you can:  Limit where you eat to a few places (eg, dining room)   Restrict the number of utensils (eg, only a fork) used for eating   Drink a sip of water between each bite   Chew your food a certain number of times   Get up and stop eating every few minutes  What happens after you eat  Rewarding yourself for good eating behaviors can help you to develop better habits. This is not a reward for weight loss; instead, it is a reward for changing unhealthy behaviors. Do not use food as a reward. Some people find money, clothing, or personal care (eg, a hair cut, manicure, or massage) to be effective rewards. Treat yourself immediately after making better eating choices to reinforce the value of the good behavior. You need to have clear behavior goals, and you must have a time frame for reaching your goals. Reward small changes along the way to your final goal.  Other factors that contribute to successful weight loss  Changing your behavior involves more than just changing unhealthy eating habits; it also involves finding people around you to support your weight loss, reducing stress, and learning to be strong when tempted by food. Establish a \"ruddy\" system  Having a friend or family member available to provide support and reinforce good behavior is very helpful. The support person needs to understand your goals. Learn to be strong  Learning to be strong when tempted by food is an important part of losing weight. As an example, you will need to learn how to say \"no\" and continue to say no when urged to eat at parties and social gatherings. Develop strategies for events before you go, such as eating before you go or taking low-calorie snacks and drinks with you. Develop a support system  Having a support system is helpful when losing weight. This is why many [employer] groups are successful. Family support is also essential; if your family does not support your efforts to lose weight, this can slow your progress or even keep you from losing weight.    Positive thinking  People often have conversations with themselves in their head; these conversations can be positive or negative. If you eat a piece of cake that was not planned, you may respond by thinking, \"Oh, you stupid idiot, you've blown your diet! \" and as a result, you may eat more cake. A positive thought for the same event could be, \"Well, I ate cake when it was not on my plan. Now I should do something to get back on track. \" A positive approach is much more likely to be successful than a negative one. Reduce stress  Although stress is a part of everyday life, it can trigger uncontrolled eating in some people. It is important to find a way to get through these difficult times without eating or by eating low-calorie food, like raw vegetables. It may be helpful to imagine a relaxing place that allows you to temporarily escape from stress. With deep breaths and closed eyes, you can imagine this relaxing place for a few minutes. Self-help programs  Self-help programs like [employer] Watchers®, Overeaters Anonymous®, and Take Off Yulex)© work for some people. As with all weight loss programs, you are most likely to be successful with these plans if you make long-term changes in how you eat. CHOOSING A DIET  A calorie is a unit of energy found in food. Your body needs calories to function. The goal of any diet is to burn up more calories than you eat. How quickly you lose weight depends upon several factors, such as your age, gender, and starting weight. Older people have a slower metabolism than young people, so they lose weight more slowly. Men lose more weight than women of similar height and weight when dieting because they use more energy. People who are extremely overweight lose weight more quickly than those who are only mildly overweight. Try not to drink alcohol or drinks with added sugar, and most sweets (candy, cakes, cookies), since they rarely contain important nutrients.   Portion-controlled diets  One simple way to diet is to buy packaged foods, like frozen low-calorie meals or meal-replacement canned drinks. A typical meal plan for one day may include:  A meal-replacement drink or breakfast bar for breakfast   A meal-replacement drink or a frozen low-calorie (250 to 350 calories) meal for lunch   A frozen low-calorie meal or other prepackaged, calorie-controlled meal, along with extra vegetables for dinner  This would give you 1000 to 1500 calories per day. Low-fat diet  To reduce the amount of fat in your diet, you can:  Eat low-fat foods. Low-fat foods are those that contain less than 30 percent of calories from fat. Fat is listed on the food facts label (figure 1). Count fat grams. For a 1500 calorie diet, this would mean about 45 g or fewer of fat per day. Low-carbohydrate diet  Low- and very-low-carbohydrate diets (eg, Atkins diet, [employer]) have become popular ways to lose weight quickly. With a very-low-carbohydrate diet, you eat between 0 and 60 grams of carbohydrates per day (a standard diet contains 200 to 300 grams of carbohydrates)   With a low-carbohydrate diet, you eat between 60 and 130 grams of carbohydrates per day  Carbohydrates are found in fruits, vegetables, and grains (including breads, rice, pasta, and cereal), alcoholic beverages, and in dairy products. Meat and fish do not contain carbohydrates. Side effects of very-low-carbohydrate diets can include constipation, headache, bad breath, muscle cramps, diarrhea, and weakness. Mediterranean diet  The term \"Mediterranean diet\" refers to a way of eating that is common in [place] regions around the [facility]. Although there is some variation in Mediterranean diets, there are some similarities.  Most Mediterranean diets include:  A high level of monounsaturated fats (from olive or canola oil, walnuts, pecans, almonds) and a low level of saturated fats (from butter)   A high amount of vegetables, fruits, legumes, and grains (7 to 10 servings of fruits and vegetables per day)   A moderate amount of milk and dairy products, mostly in the form of cheese. Use low-fat dairy products (skim milk, fat-free yogurt, low-fat cheese). A relatively low amount of red meat and meat products. Substitute fish or poultry for red meat. For those who drink alcohol, a modest amount (mainly as red wine) may help to protect against cardiovascular disease. A modest amount is up to one (4 ounce) glass per day for women and up to two glasses per day for men. Which diet is best?  Studies have compared different diets, including:  Very-low-carbohydrate (Atkins)   Macronutrient balance controlling glycemic load (Zone®)   Reduced-calorie (Weight Watchers®)   Very-low-fat (Ornish)  No one diet is \"best\" for weight loss. Any diet will help you to lose weight if you stick with the diet. Therefore, it is important to choose a diet that includes foods you like. Fad diets  Fad diets often promise quick weight loss (more than 1 to 2 pounds per week) and may claim that you do not need to exercise or give up favorite foods. Some fad diets cost a lot of money, because you have to pay for seminars or pills. Fad diets generally lack any scientific evidence that they are safe and effective, but instead rely on \"before\" and \"after\" photos or testimonials. Diets that sound too good to be true usually are. These plans are a waste of time and money and are not recommended. A doctor, nurse, or nutritionist can help you find a safe and effective way to lose weight and keep it off. WEIGHT LOSS [facility] a weight loss medicine may be helpful when used in combination with diet, exercise, and lifestyle changes. However, it is important to understand the risks and benefits of these medicines. It is also important to be realistic about your goal weight using a weight loss medicine; you may not reach your \"dream\" weight, but you may be able to reduce your risk of diabetes or heart disease.    Weight loss medicines may be recommended for people who have not been able to lose weight with diet and exercise who have a:  BMI of 30 or more    BMI between 27 and 29.9 and have other medical problems, such as diabetes, high cholesterol, or high blood pressure  Two weight loss medicines are approved in the [place] for long-term use. These are sibutramine and orlistat. Other weight loss medicines (phentermine, diethylpropion) are available but are only approved for short-term use (up to 12 weeks). Sibutramine  Sibutramine (Meridia®, Reductil®) is a medicine that reduces your appetite. In people who take the medicine for one year, the average weight loss is 10 percent of the initial body weight (5 percent more than those who took a placebo treatment). Side effects of sibutramine include insomnia, dry mouth, and constipation. Increases in blood pressure can occur. Therefore, blood pressure is usually monitored during treatment. There is no evidence that sibutramine causes heart or lung problems (like dexfenfluramine and fenfluramine (Phen/Fen)). However, experts agree that sibutramine should not used by people with coronary heart disease, heart failure, uncontrolled hypertension, stroke, irregular heart rhythms, or peripheral vascular disease (poor circulation in the legs). Orlistat  Orlistat (Xenical® 120 mg capsules) is a medicine that reduces the amount of fat your body absorbs from the foods you eat. A lower-dose version is now available without a prescription (Vivek® 60 mg capsules) in many countries, including the [place]. The medicine is recommended three times per day, taken with a meal; you can skip a dose if you skip a meal or if the meal contains no fat. After one year of treatment with orlistat, the average weight loss is approximately 8 to 10 percent of initial body weight (4 percent more than in those who took a placebo). Cholesterol levels often improve, and blood pressure sometimes falls. In people with diabetes, orlistat may help control blood sugar levels. Side effects occur in 15 to 10 percent of people and may include stomach cramps, gas, diarrhea, leakage of stool, or oily stools. These problems are more likely when you take orlistat with a high-fat meal (if more than 30 percent of calories in the meal are from fat). Side effects usually improve as you learn to avoid high-fat foods. Severe liver injury has been reported rarely in patients taking orlistat, but it is not known if orlistat caused the liver problems. Diet supplements  Diet supplements are widely used by people who are trying to lose weight, although the safety and efficacy of these supplements are often unproven. A few of the more common diet supplements are discussed below; none of these are recommended because they have not been studied carefully, and there is no proof they are safe or effective. Chitosan and wheat dextrin are ineffective for weight loss, and their use is not recommended. Ephedra, a compound related to ephedrine, is no longer available in the [place] due to safety concerns. Many nonprescription diet pills previously contained ephedra. Although some studies have shown that ephedra helps with weight loss, there can be serious side effects (psychiatric symptoms, palpitations, and stomach upset), including death. There are not enough data about the safety and efficacy of chromium, ginseng, glucomannan, green tea, hydroxycitric acid, L carnitine, psyllium, pyruvate supplements, McDonough wort, and conjugated linoleic acid. Two supplements from [facility], [address] [place] (also known as the Juan David Quiroz 15 pill) and Herbathin dietary supplement, have been shown to contain prescription drugs. Hoodia gordonii is a dietary supplement derived from a plant in [place]. It is not recommended because there is no proof that it is safe or effective.    Bitter orange (Citrus aurantium) can increase your heart rate and blood pressure and is not recommended. SHOULD I HAVE SURGERY TO LOSE WEIGHT?  Weight loss surgery is recommended ONLY for people with one of the following:  Severe obesity (body mass index above 40) (calculator 1 and calculator 2) who have not responded to diet, exercise, or weight loss medicines   Body mass index between 35 and 40, along with a serious medical problem (including diabetes, severe joint pain, or sleep apnea) that would improve with weight loss  You should be sure that you understand the potential risks and benefits of weight loss surgery. You must be motivated and willing to make lifelong changes in how you eat to reach and maintain a healthier weight after surgery. You must also be realistic about weight loss after surgery (see 'Effectiveness of weight loss surgery' below). PREPARING FOR WEIGHT LOSS SURGERY  Most people who have weight loss surgery will meet with several specialists before surgery is scheduled. This often includes a dietitian, mental health counselor, a doctor who specializes in care of obese people, and a surgeon who performs weight loss surgery (bariatric surgeon). You may need to work with these providers for several weeks or months before surgery. The nutritionist will explain what and how much you will be able to eat after surgery. You may also need to lose a small amount of weight before surgery. The mental health specialist will help you to cope with stress and other factors that can make it harder to lose weight or trigger you to eat   The medical doctor will determine whether you need other tests, counseling, or treatment before surgery. He or she might also help you begin a medical weight loss program so that you can lose some weight before surgery. The bariatric surgeon will meet with you to discuss the surgeries available to treat obesity. He or she will also make sure you are a good candidate for surgery.    TYPES OF WEIGHT LOSS SURGERY  There are several types of weight loss surgeries, the most common being lap banding, gastric bypass, and gastric sleeve. Lap banding  Laparoscopic adjustable gastric banding (LAGB), or lap banding, is a surgery that uses an adjustable band around the opening to the stomach (figure 1). This reduces the amount of food that you can eat at one time. Lap banding is done through small incisions, with a laparoscope. The band can be adjusted after surgery, allowing you to eat more or less food. Adjustments to the size and tightness of the band are made by using a needle to add or remove fluid from a port (a small container under the skin that is connected to the band). Adding fluid to the band makes it tighter which restricts the amount of food you can eat and may help you to lose more weight. Lap banding is a popular choice because it is relatively simple to perform, can be adjusted or removed, and has a low risk of serious complications immediately after surgery. However, weight loss with the lap band depends on your ability to follow the program closely. You will need to prepare nutritious meals that [facility] with\" the band, not against it. For example, the lap band will not work well if you eat or drink a large amount of liquid calories (like ice cream). The band will not help you to feel full when you eat/drink liquid calories. Weight loss ranges from 45 to 75 percent after two years. As an example, a person who is 120 pounds overweight could expect to lose approximately 54 to 90 pounds in the two years after lap banding. Gastric bypass  Hoda-en-Y gastric bypass, also called gastric bypass, helps you to lose weight by reducing the amount of food you can eat and reducing the number of calories and nutrients you absorb from the food you eat. To perform gastric bypass, a surgeon creates a small stomach pouch by dividing the stomach and attaching it to the small intestine. This helps you to lose weight in two ways:   The smaller stomach can hold less food than before surgery. This causes you to feel full after eating a very small amount of food or liquid. Over time, the pouch might stretch, allowing you to eat more food. The body absorbs fewer calories, since food bypasses most of the stomach as well as the upper small intestine. This new arrangement seems to decrease your appetite and change how you break down foods by changing the release of various hormones. Gastric bypass can be performed as open surgery (through an incision on the abdomen) or laparoscopically, which uses smaller incisions and smaller instruments. Both the laparoscopic and open techniques have risks and benefits. You and your surgeon should work together to decide which surgery, if any, is right for you. Gastric bypass has a high success rate, and people lose an average of 62 to 68 percent of their excess body weight in the first year. Weight loss typically levels off after one to two years, with an overall excess weight loss between 50 and 75 percent. For a person who is 120 pounds overweight, an average of 60 to 90 pounds of weight loss would be expected. Gastric sleeve  Gastric sleeve, also known as sleeve gastrectomy, is a surgery that reduces the size of the stomach and makes it into a narrow tube (figure 3). The new stomach is much smaller and produces less of the hormone (ghrelin) that causes hunger, helping you feel satisfied with less food. Sleeve gastrectomy is safer than gastric bypass because the intestines are not rearranged, and there is less chance of malnutrition. It also appears to control hunger better than lap banding. It might be safer than the lap banding because no foreign materials are used. The gastric sleeve has a good success rate, and people lose an average of 33 percent of their excess body weight in the first year. For a person who is 120 pounds overweight, this would mean losing about 40 pounds in the first year.   WEIGHT LOSS SURGERY COMPLICATIONS  A variety of complications can occur with weight loss surgery. The risks of surgery depend upon which surgery you have and any medical problems you had before surgery. Some of the more common early surgical complications (one to six weeks after surgery) include:  Bleeding   Infection   Blockage or tear in the bowels   Need for further surgery  Important medical complications after surgery can include blood clots in the legs or lungs, heart attack, pneumonia, and urinary tract infection. Complications are less likely when surgery is performed in centers that are experienced in weight loss surgery. In general, centers with experience in weight loss surgery have:  Board-certified doctors and surgeons   A team of support staff (dietitians, counselors, nurses)   Long-term follow-up after surgery   Hospital staff experienced with the care of weight loss patients. This includes nurses who are trained in the care of patients immediately after surgery and anesthesiologists who are experienced in caring for the morbidly obese. EFFECTIVENESS OF WEIGHT LOSS SURGERY  The goal of weight loss surgery is to reduce the risk of illness or death associated with obesity. Weight loss surgery can also help you to feel and look better, reduce the amount of money you spend on medicines, and cut down on sick days. As an example, weight loss surgery can improve health problems related to obesity (diabetes, high blood pressure, high cholesterol, sleep apnea) to the point that you need less or no medicine. Finally, weight loss surgery might reduce your risk of developing heart disease, cancer, and certain infections. AFTER WEIGHT LOSS SURGERY  You will need to stay in the hospital until your team feels that it is safe for you to leave (on average, one to three days). Do not drive if you are taking prescription pain medicine.  Begin exercising as soon as possible once you have healed; most weight loss centers will design an exercise program for you. Once you are home, it is important to eat and drink exactly what your doctor and dietitian recommend. You will see your doctor, nurse, and dietitian on a regular basis after surgery to monitor your health, diet, and weight loss. You will be able to slowly increase how much you eat over time, although it will always be important to:  Eat small, frequent meals and not skip meals   Chew your food slowly and completely   Avoid eating while \"distracted\" (such as eating while watching TV)   Stop eating when you feel full   Drink liquids at least 30 minutes before or after eating   Avoid foods high in fat or sugar   Take vitamin supplements, as recommended  It can take several months to learn to listen to your body so that you know when you are hungry and when you are full. You may dislike foods you previously loved, and you may begin to prefer new foods. This can be a frustrating process for some people, so talk to your dietitian if you are having trouble. It usually takes between one and two years to lose weight after surgery. After reaching their goal weight, some people have plastic surgery (called \"body contouring\") to remove excess skin from the body, particularly in the abdominal area. Before you decide to have weight loss surgery, you must commit to staying healthy for life. This includes following up with your healthcare team, exercising most days of the week, and eating a sensible diet every day. It can be difficult to develop new eating and exercise habits after weight loss surgery, and you will have to work hard to stick to your goals. Recovering from surgery and losing weight can be stressful and emotional, and it is important to have the support of family and friends. Working with a , therapist, or support group can help you through the ups and downs.   WHERE TO GET MORE INFORMATION  Your healthcare provider is the best source of information for questions and concerns related to your medical problem. This article will be updated as needed every four months on our Web site ([email or web address])    Smoking Cessation  This document explains the best ways for you to quit smoking and new treatments to help. It lists new medicines that can double or triple your chances of quitting and quitting for good. It also considers ways to avoid relapses and concerns you may have about quitting, including weight gain. NICOTINE: A POWERFUL ADDICTION  If you have tried to quit smoking, you know how hard it can be. It is hard because nicotine is a very addictive drug. For some people, it can be as addictive as heroin or cocaine. Usually, people make 2 or 3 tries, or more, before finally being able to quit. Each time you try to quit, you can learn about what helps and what hurts. Quitting takes hard work and a lot of effort, but you can quit smoking. QUITTING SMOKING IS ONE OF THE MOST IMPORTANT THINGS YOU WILL EVER DO.  · You will live longer, feel better, and live better. · The impact on your body of quitting smoking is felt almost immediately:  · Within 20 minutes, blood pressure decreases. Pulse returns to its normal level. · After 8 hours, carbon monoxide levels in the blood return to normal. Oxygen level increases. · After 24 hours, chance of heart attack starts to decrease. Breath, hair, and body stop smelling like smoke. · After 48 hours, damaged nerve endings begin to recover. Sense of taste and smell improve. · After 72 hours, the body is virtually free of nicotine. Bronchial tubes relax and breathing becomes easier. · After 2 to 12 weeks, lungs can hold more air. Exercise becomes easier and circulation improves. · Quitting will reduce your risk of having a heart attack, stroke, cancer, or lung disease:  · After 1 year, the risk of coronary heart disease is cut in half. · After 5 years, the risk of stroke falls to the same as a nonsmoker.   · After 10 years, the risk of lung cancer is cut in half and the risk of other cancers decreases significantly. · After 15 years, the risk of coronary heart disease drops, usually to the level of a nonsmoker. · If you are pregnant, quitting smoking will improve your chances of having a healthy baby. · The people you live with, especially your children, will be healthier. · You will have extra money to spend on things other than cigarettes. FIVE KEYS TO QUITTING  Studies have shown that these 5 steps will help you quit smoking and quit for good. You have the best chances of quitting if you use them together:  1. Get ready. 2. Get support and encouragement. 3. Learn new skills and behaviors. 4. Get medicine to reduce your nicotine addiction and use it correctly. 5. Be prepared for relapse or difficult situations. Be determined to continue trying to quit, even if you do not succeed at first.  1. GET READY  · Set a quit date. · Change your environment. · Get rid of ALL cigarettes, ashtrays, matches, and lighters in your home, car, and place of work. · Do not let people smoke in your home. · Review your past attempts to quit. Think about what worked and what did not. · Once you quit, do not smoke. NOT EVEN A PUFF! 2. GET SUPPORT AND ENCOURAGEMENT  Studies have shown that you have a better chance of being successful if you have help. You can get support in many ways. · Tell your family, friends, and coworkers that you are going to quit and need their support. Ask them not to smoke around you. · Talk to your caregivers (doctor, dentist, nurse, pharmacist, psychologist, and/or smoking counselor). · Get individual, group, or telephone counseling and support. The more counseling you have, the better your chances are of quitting. Programs are available at [facility] [email or web address] [facility]. Call your local health department for information about programs in your area.   · Spiritual beliefs and practices may help some smokers quit. · Quit meters are small computer programs online or downloadable that keep track of quit statistics, such as amount of \"quit-time,\" cigarettes not smoked, and money saved. · Many smokers find one or more of the many self-help books available useful in helping them quit and stay off tobacco.  3. LEARN NEW SKILLS AND BEHAVIORS  · Try to distract yourself from urges to smoke. Talk to someone, go for a walk, or occupy your time with a task. · When you first try to quit, change your routine. Take a different route to work. Drink tea instead of coffee. Eat breakfast in a different place. · Do something to reduce your stress. Take a hot bath, exercise, or read a book. · Plan something enjoyable to do every day. Reward yourself for not smoking. · Explore interactive web-based programs that specialize in helping you quit. 4. GET MEDICINE AND USE IT CORRECTLY  Medicines can help you stop smoking and decrease the urge to smoke. Combining medicine with the above behavioral methods and support can quadruple your chances of successfully quitting smoking. The [place] Food and Drug Administration (FDA) has approved 7 medicines to help you quit smoking. These medicines fall into 3 categories. · Nicotine replacement therapy (delivers nicotine to your body without the negative effects and risks of smoking):  · Nicotine gum: Available over-the-counter. · Nicotine lozenges: Available over-the-counter. · Nicotine inhaler: Available by prescription. · Nicotine nasal spray: Available by prescription. · Nicotine skin patches (transdermal): Available by prescription and over-the-counter. · Antidepressant medicine (helps people abstain from smoking, but how this works is unknown):  · Bupropion sustained-release (SR) tablets: Available by prescription. · Nicotinic receptor partial agonist (simulates the effect of nicotine in your brain): · Varenicline tartrate tablets: Available by prescription.   · Ask your caregiver for advice about which medicines to use and how to use them. Carefully read the information on the package. · Everyone who is trying to quit may benefit from using a medicine. If you are pregnant or trying to become pregnant, nursing an infant, you are under age 25, or you smoke fewer than 10 cigarettes per day, talk to your caregiver before taking any nicotine replacement medicines. · You should stop using a nicotine replacement product and call your caregiver if you experience nausea, dizziness, weakness, vomiting, fast or irregular heartbeat, mouth problems with the lozenge or gum, or redness or swelling of the skin around the patch that does not go away. · Do not use any other product containing nicotine while using a nicotine replacement product. · Talk to your caregiver before using these products if you have diabetes, heart disease, asthma, stomach ulcers, you had a recent heart attack, you have high blood pressure that is not controlled with medicine, a history of irregular heartbeat, or you have been prescribed medicine to help you quit smoking. 5. BE PREPARED FOR RELAPSE OR DIFFICULT SITUATIONS  · Most relapses occur within the first 3 months after quitting. Do not be discouraged if you start smoking again. Remember, most people try several times before they finally quit. · You may have symptoms of withdrawal because your body is used to nicotine. You may crave cigarettes, be irritable, feel very hungry, cough often, get headaches, or have difficulty concentrating. · The withdrawal symptoms are only temporary. They are strongest when you first quit, but they will go away within 10 to 14 days. Here are some difficult situations to watch for:  · Alcohol. Avoid drinking alcohol. Drinking lowers your chances of successfully quitting. · Caffeine. Try to reduce the amount of caffeine you consume. It also lowers your chances of successfully quitting. · Other smokers.  Being around smoking can make you want to smoke. Avoid smokers. · Weight gain. Many smokers will gain weight when they quit, usually less than 10 pounds. Eat a healthy diet and stay active. Do not let weight gain distract you from your main goal, quitting smoking. Some medicines that help you quit smoking may also help delay weight gain. You can always lose the weight gained after you quit. · Bad mood or depression. There are a lot of ways to improve your mood other than smoking. If you are having problems with any of these situations, talk to your caregiver. SPECIAL SITUATIONS AND CONDITIONS  Studies suggest that everyone can quit smoking. Your situation or condition can give you a special reason to quit. · Pregnant women/new mothers: By quitting, you protect your baby's health and your own. · Hospitalized patients: By quitting, you reduce health problems and help healing. · Heart attack patients: By quitting, you reduce your risk of a second heart attack. · Lung, head, and neck cancer patients: By quitting, you reduce your chance of a second cancer. · Parents of children and adolescents: By quitting, you protect your children from illnesses caused by secondhand smoke. QUESTIONS TO THINK ABOUT  Think about the following questions before you try to stop smoking. You may want to talk about your answers with your caregiver. · Why do you want to quit? · If you tried to quit in the past, what helped and what did not? · What will be the most difficult situations for you after you quit? How will you plan to handle them? · Who can help you through the tough times? Your family? Friends? Caregiver? · What pleasures do you get from smoking? What ways can you still get pleasure if you quit? Here are some questions to ask your caregiver:  · How can you help me to be successful at quitting? · What medicine do you think would be best for me and how should I take it? · What should I do if I need more help? · What is smoking withdrawal like?  How can I get information on withdrawal?  Quitting takes hard work and a lot of effort, but you can quit smoking. FOR MORE INFORMATION   Smokefree. gov ([employer]) provides free, accurate, evidence-based information and professional assistance to help support the immediate and long-term needs of people trying to quit smoking. Document Released: 12/12/2002 Document Revised: 12/06/2012 Document Reviewed: 10/04/2010  [facility] Patient Information ©2012 Jacklyn Rubinstein.     tv

## 2022-06-21 DIAGNOSIS — E66.01 CLASS 2 SEVERE OBESITY DUE TO EXCESS CALORIES WITH SERIOUS COMORBIDITY AND BODY MASS INDEX (BMI) OF 38.0 TO 38.9 IN ADULT (HCC): ICD-10-CM

## 2022-06-22 RX ORDER — PHENTERMINE HYDROCHLORIDE 30 MG/1
CAPSULE ORAL
Qty: 30 CAPSULE | Refills: 0 | Status: SHIPPED | OUTPATIENT
Start: 2022-06-22 | End: 2022-07-22

## 2022-06-22 NOTE — TELEPHONE ENCOUNTER
Request for Phentermine. Next Visit Date:  Future Appointments   Date Time Provider Nathalie Barraza   8/17/2022  2:45 PM STA DIAG MAMMO RM 3 STAZ MAMMO STA Radiolog   8/24/2022  2:30 PM Alena Pagan DO [facility] [address] Maintenance   Topic Date Due    COVID-19 Vaccine (1) Never done    Low dose CT lung screening  Never done    Pneumococcal 0-64 years Vaccine (2 - PCV) 07/14/2018    Lipids  12/02/2021    Diabetic retinal exam  04/13/2022    Diabetic microalbuminuria test  05/06/2022    A1C test (Diabetic or Prediabetic)  06/10/2022    Diabetic foot exam  06/17/2022    Hepatitis B vaccine (1 of 3 - Risk 3-dose series) 10/18/2022 (Originally 6/28/1984)    Shingles vaccine (1 of 2) 10/29/2022 (Originally 6/28/2017)    Breast cancer screen  08/16/2022    Flu vaccine (Season Ended) 09/01/2022    Depression Monitoring  03/10/2023    Colorectal Cancer Screen  08/19/2023    Cervical cancer screen  08/17/2026    DTaP/Tdap/Td vaccine (2 - Td or Tdap) 11/30/2027    Hepatitis C screen  Completed    HIV screen  Completed    Hepatitis A vaccine  Aged Out    Hib vaccine  Aged Out    Meningococcal (ACWY) vaccine  Aged Out       Hemoglobin A1C (%)   Date Value   03/10/2022 9.8   05/06/2021 11.0   04/27/2021 10.7 (H)             ( goal A1C is < 7)   Microalb/Crt.  Ratio (mcg/mg creat)   Date Value   09/25/2018 12     LDL Cholesterol (mg/dL)   Date Value   12/02/2020            (goal LDL is <100)   AST (U/L)   Date Value   12/02/2020 22     ALT (U/L)   Date Value   12/02/2020 25     BUN (mg/dL)   Date Value   12/02/2020 11     BP Readings from Last 3 Encounters:   06/02/22 135/87   05/19/22 138/86   04/21/22 130/81          (goal 120/80)    All Future Testing planned in [facility]  Lab Frequency Next Occurrence   CT Lung Screening Once 06/16/2022   Lipid Panel Once 05/07/2022   Full PFT Study With Bronchodilator Once 10/29/2021   Microalbumin / Creatinine Urine Ratio Once 06/11/2022   Lipid

## 2022-06-29 ENCOUNTER — OFFICE VISIT (OUTPATIENT)
Dept: INTERNAL MEDICINE | Age: 57
End: 2022-06-29
Payer: COMMERCIAL

## 2022-06-29 VITALS
HEIGHT: 63 IN | WEIGHT: 221 LBS | OXYGEN SATURATION: 98 % | SYSTOLIC BLOOD PRESSURE: 138 MMHG | HEART RATE: 77 BPM | TEMPERATURE: 97.2 F | BODY MASS INDEX: 39.16 KG/M2 | DIASTOLIC BLOOD PRESSURE: 84 MMHG

## 2022-06-29 DIAGNOSIS — B30.9 VIRAL CONJUNCTIVITIS OF BOTH EYES: Primary | ICD-10-CM

## 2022-06-29 PROCEDURE — G8427 DOCREV CUR MEDS BY ELIG CLIN: HCPCS

## 2022-06-29 PROCEDURE — 99213 OFFICE O/P EST LOW 20 MIN: CPT

## 2022-06-29 PROCEDURE — 4004F PT TOBACCO SCREEN RCVD TLK: CPT

## 2022-06-29 PROCEDURE — G8417 CALC BMI ABV UP PARAM F/U: HCPCS

## 2022-06-29 PROCEDURE — 99211 OFF/OP EST MAY X REQ PHY/QHP: CPT | Performed by: INTERNAL MEDICINE

## 2022-06-29 PROCEDURE — 3017F COLORECTAL CA SCREEN DOC REV: CPT

## 2022-06-29 RX ORDER — KETOTIFEN FUMARATE 0.35 MG/ML
1 SOLUTION/ DROPS OPHTHALMIC 2 TIMES DAILY
Qty: 1 ML | Refills: 0 | Status: SHIPPED | OUTPATIENT
Start: 2022-06-29 | End: 2022-07-09

## 2022-06-29 ASSESSMENT — ENCOUNTER SYMPTOMS
PHOTOPHOBIA: 0
EYE ITCHING: 0
FACIAL SWELLING: 0
RHINORRHEA: 0
EYE PAIN: 0
EYE REDNESS: 0
EYE DISCHARGE: 1
SINUS PAIN: 0
SINUS PRESSURE: 0

## 2022-06-29 ASSESSMENT — VISUAL ACUITY: OU: 1

## 2022-06-29 NOTE — PROGRESS NOTES
Attending Physician Statement  I have discussed the care of Kishan Prey including pertinent history and exam findings,  with the resident. I have reviewed the key elements of all parts of the encounter with the resident. I agree with the assessment, plan and orders as documented by the resident.   (GE Modifier)    MD HITESH Moore  Attending Physician, [address] [place] [address] Internal Medicine Residency Program  [address] [place] [address]  6/29/2022, 3:06 PM

## 2022-06-29 NOTE — PROGRESS NOTES
[facility] [address] [address]  Dept: [phone]  Dept Fax: [phone]    Office Progress/Follow Up Note  Date ofpatient's visit: 6/29/2022  Patient's Name:  Kiah Meadows YOB: 1965            Patient Care Team:  Wilmer Ames MD as PCP - General (Internal Medicine)  Samuel Waddell MD as PCP - [facility]  Javier Choudhury MD as Referring Physician (Internal Medicine)  ================================================================    REASON FOR VISIT/CHIEF COMPLAINT:  Eye Problem (blurry vision, draining , itchiness x 1 week bilateral eyes ) and Health Maintenance (labs reprinted, ct reprinted, echo reprinted, foot exam due, eye exam done at [address] [facility] [address] [facility] will get records )    HISTORY OF PRESENTING ILLNESS:  History was obtained from: patient, electronic medical record. Bharat Lopez a 56 y.o. is here for a thick discharge from bilateral eyes and blurring of vision for 1 week. Blurring is present all the time. No redness was present initially but she washes it with soap to clean thats making it red. Denies eye pain,  Itching. No flulike or upper respiratory tract Symptoms. No discharge from the ear or nose. No history of glaucoma or autoimmune diseases. No history of trauma or foreign body. Denies headache. Does not check her blood sugars or blood pressure at home. Blood pressure at the office is 138/84. She was recently started on phentermine. On examination bilateral conjunctivitis noted. Extraocular movements intact. Pupils are bilaterally equal and reactive to light. Vision is grossly normal.  No sore throat or discharge from the ear or nose noted. Diagnosis Orders   1.  Viral conjunctivitis of both eyes  ketotifen (ZADITOR) 0.025 % ophthalmic solution      Patient Active Problem List   Diagnosis    Class 2 severe obesity due to excess calories with serious strip 11    Lancets MISC USE 1 LANCET TO CHECK GLUCOSE TWICE DAILY 100 each 11    furosemide (LASIX) 40 MG tablet Take 1 tablet by mouth daily (Patient not taking: Reported on 2022) 30 tablet 3    potassium chloride (KLOR-CON M) 10 MEQ extended release tablet Take 1 tablet by mouth daily (Patient not taking: Reported on 2022) 30 tablet 5    atorvastatin (LIPITOR) 10 MG tablet Once daily (Patient not taking: Reported on 2022) 30 tablet 5    EQ ASPIRIN ADULT LOW DOSE 81 MG EC tablet Take 1 tablet by mouth once daily (Patient not taking: Reported on 2022) 90 tablet 1    pregabalin (LYRICA) 200 MG capsule TAKE 1 TABLET BY MOUTH NIGHTLY FOR 30 DAYS (Patient not taking: Reported on 2022) 30 capsule 1    glimepiride (AMARYL) 2 MG tablet Take 1 tablet by mouth every morning (Patient not taking: Reported on 2022) 90 tablet 1     Current Facility-Administered Medications   Medication Dose Route Frequency Provider Last Rate Last Admin    cyanocobalamin injection 1,000 mcg  1,000 mcg IntraMUSCular Once Leonel Nelson MD           Social History     Tobacco Use    Smoking status: Current Every Day Smoker     Packs/day: 1.00     Years: 42.00     Pack years: 42.00     Types: Cigarettes     Start date:     Smokeless tobacco: Never Used   Substance Use Topics    Alcohol use: Not Currently     Alcohol/week: 0.8 - 1.7 standard drinks     Types: 1 - 2 Standard drinks or equivalent per week     Comment: socially    Drug use: No       Family History   Problem Relation Age of Onset    Diabetes Mother     Hypertension Mother     Cancer Father 76        rectal cancer    Breast Cancer Sister 22        had a double mastectomy    Diabetes Maternal Grandmother     Colon Cancer Neg Hx     Eclampsia Neg Hx     Ovarian Cancer Neg Hx      Labor Neg Hx     Spont Abortions Neg Hx     Stroke Neg Hx         REVIEW OF SYSTEMS:  Review of Systems   Constitutional: Negative for activity visit:    Viral conjunctivitis of both eyes  -     ketotifen (ZADITOR) 0.025 % ophthalmic solution; Place 1 drop into both eyes 2 times daily for 10 days  -Avoid washing eyes with a soap  -Call us back or go to the urgent care if there is worsening of pain or vision changes. FOLLOW UP AND INSTRUCTIONS:  No follow-ups on file. · Juliette received counseling on the following healthy behaviors: medication adherence    · Discussed use, benefit, and side effects of prescribed medications. Barriers to medication compliance addressed. All patient questions answered. Pt voiced understanding. · Patient given educational materials - see patient instructions    Mechelle Crowe MD  Resident Internal Medicine  [facility],  [facility]. 6/29/2022, 3:08 PM    This note is created with the assistance of a speech-recognition program. While intending to generate a document that actually reflects the content of thevisit, the document can still have some mistakes which may not have been identified and corrected by editing.

## 2022-08-17 ENCOUNTER — HOSPITAL ENCOUNTER (OUTPATIENT)
Dept: MAMMOGRAPHY | Age: 57
Discharge: HOME | End: 2022-08-19
Payer: COMMERCIAL

## 2022-08-17 DIAGNOSIS — Z12.39 SCREENING BREAST EXAMINATION: ICD-10-CM

## 2022-08-17 PROCEDURE — 77063 BREAST TOMOSYNTHESIS BI: CPT

## 2022-08-18 ENCOUNTER — TELEPHONE (OUTPATIENT)
Dept: INTERNAL MEDICINE | Age: 57
End: 2022-08-18

## 2022-08-18 NOTE — TELEPHONE ENCOUNTER
Pt spoke to Christiano Greenwood, , yesterday needing order for incontinence supplies signed and faxed to [facility] fax number [phone]. Christiano Greenwood was told CMN is waiting on Dr. Garcia Getting signature and requested writer ask another provider in office to sign order and fax as Christiano Greenwood is out of office unexpectedly today. Unable to locate CMN in Dr. Yuri Bonilla. PC to [facility] to request they re-fax order to writer. Writer was sent to  for the department that handles faxes to and from doctor's offices. Writer LM requesting CMN be faxed to [phone] attn: to Karyle Sears. Will follow up. Addendum [phone]: Have not received CMN, PC back to [facility]. Placed writer's number in phone queue for call back, received call back almost immediately from José Manuel at [facility]. She spoke with someone in faxing department, transferred writer to Darryl Olivarez in that department. Darryl Olivarez states writer should receive in the next 5-20 minutes, writer to call her back directly if not received. [phone]    Received CMN, placed in Dr. Yuri Bonilla for signing. She is in office tomorrow, PC to pt to let her know order is being processed, writer to fax order back to [facility] once signed.

## 2022-08-19 ENCOUNTER — TELEPHONE (OUTPATIENT)
Dept: INTERNAL MEDICINE | Age: 57
End: 2022-08-19

## 2022-08-19 DIAGNOSIS — I50.30 DIASTOLIC CONGESTIVE HEART FAILURE, UNSPECIFIED HF CHRONICITY (HCC): ICD-10-CM

## 2022-08-19 DIAGNOSIS — I10 ESSENTIAL HYPERTENSION: Primary | ICD-10-CM

## 2022-08-19 NOTE — TELEPHONE ENCOUNTER
PC to pt to let her know CMN was signed and faxed back to [facility], no answer. Unable to [facility] mailbox full. Will scan confirmation in to media.

## 2022-08-19 NOTE — TELEPHONE ENCOUNTER
Phone call from [facility] stating that the patient scheduled her Echo that was order in March which is scheduled for 8/25. Patient's insurance will probably not cover the Echo because she has not had a recent EKG. Last EKG was done 12/20. She stated that if office can order an EKG just in case her insurance will not cover the Echo. Karen Thacker would like a call back stating if the EKG is ordered or not and she can inform the patient if her insurance does not cover the Echo.

## 2022-08-24 ENCOUNTER — OFFICE VISIT (OUTPATIENT)
Dept: OBGYN | Age: 57
End: 2022-08-24
Payer: COMMERCIAL

## 2022-08-24 ENCOUNTER — HOSPITAL ENCOUNTER (OUTPATIENT)
Age: 57
Setting detail: SPECIMEN
Discharge: HOME | End: 2022-08-24

## 2022-08-24 VITALS
WEIGHT: 227 LBS | BODY MASS INDEX: 40.22 KG/M2 | SYSTOLIC BLOOD PRESSURE: 115 MMHG | HEIGHT: 63 IN | HEART RATE: 72 BPM | DIASTOLIC BLOOD PRESSURE: 71 MMHG

## 2022-08-24 DIAGNOSIS — Z20.2 EXPOSURE TO SEXUALLY TRANSMITTED DISEASE (STD): ICD-10-CM

## 2022-08-24 DIAGNOSIS — Z01.419 WELL WOMAN EXAM: Primary | ICD-10-CM

## 2022-08-24 DIAGNOSIS — Z80.3 FAMILY HISTORY OF BREAST CANCER: ICD-10-CM

## 2022-08-24 PROCEDURE — 99396 PREV VISIT EST AGE 40-64: CPT | Performed by: STUDENT IN AN ORGANIZED HEALTH CARE EDUCATION/TRAINING PROGRAM

## 2022-08-24 NOTE — PROGRESS NOTES
[facility] OB/GYN Annual Visit    Shweta Briones  8/24/2022                       Primary Care Physician: Caitlyn Valentino MD    CC:   Chief Complaint   Patient presents with    Annual Exam     annual         HPI: Shweta Briones is a 56 y.o. female [id number]    The patient was seen and examined. She is here for an annual visit. She has no complaints. She is currently sexually active and desires STI testing. Patient's last menstrual period was 06/10/2013. Jax Chaparro She denies any postmenopausal bleeding. Her bowel habits are regular. She denies any bloating. She denies dysuria. She denies urinary leaking. She denies vaginal discharge.     Depression Screen: Symptoms of decreased mood absent  Symptoms of anhedonia absent  **If either question is answered in a  positive fashion then complete the PHQ9 Scoring Evaluation and make the appropriate referral**    REVIEW OF SYSTEMS:   Constitutional: negative fever, negative chills, negative weight changes   HEENT: negative visual disturbances, negative headaches, negative dizziness, negative hearing loss  Breast: Negative breast abnormalities, negative breast lumps, negative nipple discharge  Respiratory: negative dyspnea, negative cough, negative SOB  Cardiovascular: negative chest pain,  negative palpitations, negative arrhythmia, negative syncope   Gastrointestinal: positive intermittent abdominal pain, negative RUQ pain, negative N/V, negative diarrhea, negative constipation, negative bowel changes, negative heartburn   Genitourinary: negative dysuria, negative hematuria, negative urinary incontinence, negative vaginal discharge, negative vaginal bleeding or spotting  Dermatological: negative rash, negative pruritis, negative mole or other skin changes  Hematologic: negative bruising  Immunologic/Lymphatic: negative recent illness, negative recent sick contact  Musculoskeletal: negative back pain, negative myalgias, negative arthralgias  Neurological:  negative dizziness, negative migraines, negative seizures, negative weakness  Behavior/Psych: negative depression, negative anxiety, negative SI, negative HI   ________________________________________________________________________    GYNECOLOGICAL HISTORY:  Age of Menarche: 10  Age of Menopause: 54     Sexually Active: yes, male partner  STD History: remote history    Pap History: Last PAP was normal; 23  Colposcopy History: denies    Permanent Sterilization: yes - s/p tubal ligation  Reversible Birth Control: no  Hormone Replacement Exposure: no    HEALTH MAINTENANCE:  Date of Last Mammogram: 24 BIRADS 2  Date of Last Colonoscopy: cologard negative 2020  Date of Last Bone Density: N./A,    OBSTETRICAL HISTORY:  OB History    Para Term  AB Living   4 3 3 0 1 3   SAB IAB Ectopic Molar Multiple Live Births   0 0 0 0 0 0      # Outcome Date GA Lbr Aldo/2nd Weight Sex Delivery Anes PTL Lv   4 Term            3 Term            2 Term            1 AB                PAST MEDICAL HISTORY:   has a past medical history of Anxiety, Chronic back pain, COPD (chronic obstructive pulmonary disease) ([facility] [address] Fibromyalgia, GERD (gastroesophageal reflux disease), HSV-2 (herpes simplex virus 2) infection, Hyperlipidemia, Hypertension, Neuropathy, Obesity, CHARLES (obstructive sleep apnea), Osteoarthritis, Post-traumatic osteoarthritis of left hand, Type II or unspecified type diabetes mellitus without mention of complication, not stated as uncontrolled, Uncontrolled type 2 diabetes mellitus without complication, without long-term current use of insulin, and Urinary incontinence. PAST SURGICAL HISTORY:   has a past surgical history that includes Tonsillectomy and adenoidectomy and Tubal ligation. ALLERGIES:  is allergic to latex, vicodin [hydrocodone-acetaminophen], zocor [simvastatin], and lisinopril. MEDICATIONS:  Prior to Admission medications    Medication Sig Start Date End Date Taking?  Authorizing Provider pregabalin (LYRICA) 300 MG capsule TAKE 1 CAPSULE BY MOUTH TWICE DAILY 3/28/22  Yes Historical Provider, MD   ibuprofen (ADVIL;MOTRIN) 800 MG tablet Take 1 tablet by mouth every 8 hours as needed for Pain 4/1/22 4/21/23 Yes Kathleen Munoz MD   albuterol sulfate HFA (VENTOLIN HFA) 108 (90 Base) MCG/ACT inhaler Inhale 2 puffs into the lungs 4 times daily as needed for Wheezing 3/11/22  Yes Gianna Hernandes MD   ferrous sulfate (IRON 325) 325 (65 Fe) MG tablet Take 325 mg by mouth daily 12/22/20  Yes Historical Provider, MD PATEL ASPIRIN ADULT LOW DOSE 81 MG EC tablet Take 1 tablet by mouth once daily 7/20/21  Yes Gianna Hernandes MD   metFORMIN (GLUCOPHAGE) 1000 MG tablet Take 1 tablet by mouth twice daily with food 7/13/21  Yes Gianna Hernandes MD   traMADol Gio Pretty) 50 MG tablet  4/26/21  Yes Historical Provider, MD   tiZANidine (ZANAFLEX) 4 MG tablet Take 1 tablet by mouth 2 times daily 2/17/21  Yes Duy Cohen MD   blood glucose test strips (TRUE METRIX BLOOD GLUCOSE TEST) strip USE 1 STRIP TO CHECK GLUCOSE TWICE DAILY 5/20/20  Yes Gianna Hernandes MD   Lancets MISC USE 1 LANCET TO CHECK GLUCOSE TWICE DAILY 5/20/20  Yes Gianna Hernandes MD   furosemide (LASIX) 40 MG tablet Take 1 tablet by mouth daily  Patient not taking: Reported on 8/24/2022 3/10/22   Gianna Hernandes MD   potassium chloride (KLOR-CON M) 10 MEQ extended release tablet Take 1 tablet by mouth daily  Patient not taking: No sig reported 3/10/22   Gianna Hernandes MD   atorvastatin (LIPITOR) 10 MG tablet Once daily  Patient not taking: Reported on 6/29/2022 10/29/21   Gianna Hernandes MD   pregabalin (LYRICA) 200 MG capsule TAKE 1 TABLET BY MOUTH NIGHTLY FOR 30 DAYS  Patient not taking: Reported on 8/24/2022 11/10/20 10/29/22  Gianna Hernandes MD   glimepiride (AMARYL) 2 MG tablet Take 1 tablet by mouth every morning  Patient not taking: No sig reported 6/26/20   Gainna Hernandes MD       FAMILY HISTORY:  Family History of Breast, Ovarian, Colon or Uterine Cancer: Yes   family history includes Breast Cancer (age of onset: 22) in her sister; Cancer (age of onset: 76) in her father; Diabetes in her maternal grandmother and mother; Hypertension in her mother. SOCIAL HISTORY:   reports that she has been smoking cigarettes. She started smoking about 43 years ago. She has a 42.00 pack-year smoking history. She has never used smokeless tobacco. She reports that she does not currently use alcohol after a past usage of about 0.8 - 1.7 standard drinks per week. She reports that she does not use drugs. VITALS:  Vitals:    08/24/22 1438   BP: 115/71   Site: Right Upper Arm   Position: Sitting   Cuff Size: Medium Adult   Pulse: 72   Weight: 227 lb (103 kg)   Height: 5' 3\" (1.6 m)                                                                                                                                                                          PHYSICAL EXAM:   General Appearance: Appears healthy. Alert; in no acute distress. Pleasant. Skin: Skin color, texture, turgor normal. No rashes or lesions. Lymphatic: No abnormally enlarged lymph nodes. HEENT: normocephalic and atraumatic  Respiratory: Normal expansion. Clear to auscultation. No rales, rhonchi, or wheezing.   Cardiovascular: normal rate, regular rhythm, and no murmurs  Breast:  (Chest): normal appearance, no masses or tenderness, No nipple retraction or dimpling, No nipple discharge or bleeding, No axillary or supraclavicular adenopathy, Normal to palpation without dominant masses, scar from previous biopsy site on the left  Abdomen: soft, non-tender, non-distended, no right upper quadrant tenderness, and no CVA tenderness  Pelvic Exam:   Chaperone for Intimate Exam: Chaperone was present for entire exam, Chaperone Name: GI [place]am[place]  External genitalia: normal general appearance  Urinary system: urethral meatus normal  Vaginal: normal without tenderness, induration or masses  Cervix: normal appearance  Adnexa: normal bimanual exam  Uterus: normal single, nontender  Musculoskeletal: no gross abnormalities  Extremities: non-tender BLE and non-edematous  Psych:  oriented to time, place and person, mood and affect are within normal limits     DATA:  No results found for this visit on 08/24/22. ASSESSMENT & PLAN:    Steve Payne is a 56 y.o. female [id number] here for annual exam   - VSS, afebrile   - Pap negative 8/17/21   - Vaginitis and GC/C collected   - HIV, Tpal, Hep panel ordered at patient request   - Mammogram BIRADS 2 8/17/22   - Cologard negative 8/19/20    Fhx Cancer   - Pt sister diagnosed with breast cancer at age 22   - Pt other sister recently passed away from stomach cancer   - Pt also reports history of colon and prostate cancer   - Genetics referral placed    Intermittent Abdominal Pain   - Suspect due to GERD   - Pt also voices she would like to complete colonoscopy   - Referral to GI placed    Patient Active Problem List    Diagnosis Date Noted    Hyperlipidemia 12/13/2011     Priority: High    Acute viral conjunctivitis of both eyes 06/29/2022     Priority: Medium    Class 2 severe obesity due to excess calories with serious comorbidity and body mass index (BMI) of 38.0 to 38.9 in adult ([facility] [address] 12/13/2011     Priority: Medium    Vitamin D deficiency 02/19/2021    CHARLES (obstructive sleep apnea) 02/19/2021    Abnormal mammogram of left breast. (8/2020) 02/19/2021    Type 2 diabetes mellitus without complication, without long-term current use of insulin ([facility] [address] 05/22/2019    Fibromyalgia 03/12/2019    Essential hypertension 04/27/2018    Mucoid cyst of joint 06/14/2017    Post-traumatic osteoarthritis of left hand 06/14/2017    Anxiety 02/23/2017       Return in about 1 year (around 8/24/2023) for Annual.    No Patient Care Coordination Note on file.       Counseling Completed:    Counseled about need for repeat pap as per American Society for Colposcopy and Cervical Pathology guidelines. Counseled about need for mammograms every 1 year, If >42 yo and last mammogram was negative. Counseled about need for colonoscopy screening as well as onset for bone density testing. Counseled about STD counseling and prevention. Counseled about Hereditary Breast, Ovarian, Colon and Uterine Cancer screening. Tobacco & Secondary smoke risks discussed; with recommendation for cessation and avoidance. Routine health maintenance per patients PCP discussed. Patient was seen with total face to face time of 20 minutes. More than 50% of this visit was on counseling and education regarding the problems listed below and her options. She was also counseled on her preventative health maintenance recommendations and follow-up. Diagnosis Orders   1. Well woman exam  Clay Mcmillan MD, Gastroenterology, [facility]      2. Exposure to sexually transmitted disease (STD)  Vaginitis DNA Probe    C.trachomatis N.gonorrhoeae DNA    HIV Screen    T. pallidum Ab    Hepatitis Panel, Acute      3.  Family history of breast cancer  [address] [facility], DO  Ob/Gyn Resident  [facility] [place]  8/24/2022, 5:59 PM

## 2022-08-25 DIAGNOSIS — B96.89 BV (BACTERIAL VAGINOSIS): Primary | ICD-10-CM

## 2022-08-25 DIAGNOSIS — N76.0 BV (BACTERIAL VAGINOSIS): Primary | ICD-10-CM

## 2022-08-25 DIAGNOSIS — Z20.2 EXPOSURE TO SEXUALLY TRANSMITTED DISEASE (STD): ICD-10-CM

## 2022-08-25 LAB
C TRACH DNA GENITAL QL NAA+PROBE: NEGATIVE
CANDIDA SPECIES, DNA PROBE: NEGATIVE
GARDNERELLA VAGINALIS, DNA PROBE: POSITIVE
N. GONORRHOEAE DNA: NEGATIVE
SOURCE: ABNORMAL
SPECIMEN DESCRIPTION: NORMAL
TRICHOMONAS VAGINALIS DNA: NEGATIVE

## 2022-08-25 RX ORDER — METRONIDAZOLE 7.5 MG/G
GEL VAGINAL
Qty: 70 G | Refills: 0 | Status: SHIPPED | OUTPATIENT
Start: 2022-08-25 | End: 2022-09-01

## 2022-08-25 RX ORDER — METRONIDAZOLE 500 MG/1
500 TABLET ORAL 2 TIMES DAILY
Qty: 14 TABLET | Refills: 0 | Status: SHIPPED | OUTPATIENT
Start: 2022-08-25 | End: 2022-08-25 | Stop reason: ALTCHOICE

## 2022-08-26 NOTE — TELEPHONE ENCOUNTER
E-scribe request for metformin. Please review and e-scribe if applicable. Next Visit Date:  Future Appointments   Date Time Provider Nathalie Christi   9/28/2022 10:30 AM Jason Ortega  [address] Maintenance   Topic Date Due    Low dose CT lung screening  Never done    Lipids  12/02/2021    Diabetic retinal exam  04/13/2022    Diabetic microalbuminuria test  05/06/2022    A1C test (Diabetic or Prediabetic)  06/10/2022    Diabetic foot exam  06/17/2022    Hepatitis B vaccine (1 of 3 - Risk 3-dose series) 10/18/2022 (Originally 6/28/1984)    Shingles vaccine (1 of 2) 10/29/2022 (Originally 6/28/2017)    Pneumococcal 0-64 years Vaccine (2 - PCV) 12/29/2022 (Originally 7/14/2018)    COVID-19 Vaccine (1) 12/29/2022 (Originally 1965)    Flu vaccine (1) 09/01/2022    Depression Monitoring  03/10/2023    Breast cancer screen  08/17/2023    Colorectal Cancer Screen  08/19/2023    Cervical cancer screen  08/17/2026    DTaP/Tdap/Td vaccine (2 - Td or Tdap) 11/30/2027    Hepatitis C screen  Completed    HIV screen  Completed    Hepatitis A vaccine  Aged Out    Hib vaccine  Aged Out    Meningococcal (ACWY) vaccine  Aged Out               (applicable per patient's age: Cancer Screenings, Depression Screening, Fall Risk Screening, Immunizations)    Hemoglobin A1C (%)   Date Value   03/10/2022 9.8   05/06/2021 11.0   04/27/2021 10.7 (H)     Microalb/Crt.  Ratio (mcg/mg creat)   Date Value   09/25/2018 12     LDL Cholesterol (mg/dL)   Date Value   12/02/2020          AST (U/L)   Date Value   12/02/2020 22     ALT (U/L)   Date Value   12/02/2020 25     BUN (mg/dL)   Date Value   12/02/2020 11      (goal A1C is < 7)   (goal LDL is <100) need 30-50% reduction from baseline     BP Readings from Last 3 Encounters:   08/24/22 115/71   06/29/22 138/84   06/02/22 135/87    (goal /80)      All Future Testing planned in [facility]:  Lab Frequency Next Occurrence   Lipid Panel Once 05/07/2022   Full PFT Study With Bronchodilator Once 10/29/2021   Microalbumin / Creatinine Urine Ratio Once 06/11/2022   Lipid Panel Once 06/11/2022   Comprehensive Metabolic Panel Once 62/55/1607   CBC Once 06/11/2022   ECHO Complete 2D W Doppler W Color Once 03/10/2022   EKG 12 lead Once 08/19/2022   HIV Screen Once 08/31/2022   T. pallidum Ab Once 08/24/2022   Hepatitis Panel, Acute Once 08/24/2022            Patient Active Problem List:     Class 2 severe obesity due to excess calories with serious comorbidity and body mass index (BMI) of 38.0 to 38.9 in adult [facility])     Hyperlipidemia     Anxiety     Mucoid cyst of joint     Post-traumatic osteoarthritis of left hand     Essential hypertension     Fibromyalgia     Type 2 diabetes mellitus without complication, without long-term current use of insulin (HCC)     Vitamin D deficiency     CHARLES (obstructive sleep apnea)     Abnormal mammogram of left breast. (8/2020)     Acute viral conjunctivitis of both eyes

## 2022-08-26 NOTE — PROGRESS NOTES
Attending Physician Statement  I have discussed the care of Ricki Stevenson, including pertinent history and exam findings,  with the resident. I have reviewed the key elements of all parts of the encounter with the resident. I agree with the assessment, plan and orders as documented by the resident.   (GE Modifier)    Luiza Ochoa, DO

## 2022-10-13 DIAGNOSIS — J30.2 SEASONAL ALLERGIES: ICD-10-CM

## 2022-10-13 NOTE — TELEPHONE ENCOUNTER
Refill request for EQ ALLERGY RELIEF 10 MG tablet. If appropriate please send medication(s) to patients pharmacy. Next appt: 10/18/2022    The original prescription was discontinued on 10/29/2021 by Roby Velez MA for the following reason: Therapy completed. Renewing this prescription may not be appropriate. Health Maintenance   Topic Date Due    Low dose CT lung screening  Never done    Lipids  12/02/2021    Diabetic retinal exam  04/13/2022    Diabetic microalbuminuria test  05/06/2022    A1C test (Diabetic or Prediabetic)  06/10/2022    Diabetic foot exam  06/17/2022    Flu vaccine (1) Never done    Hepatitis B vaccine (1 of 3 - Risk 3-dose series) 10/18/2022 (Originally 6/28/1984)    Shingles vaccine (1 of 2) 10/29/2022 (Originally 6/28/2017)    COVID-19 Vaccine (1) 12/29/2022 (Originally 1965)    Depression Monitoring  03/10/2023    Breast cancer screen  08/17/2023    Colorectal Cancer Screen  08/19/2023    Cervical cancer screen  08/17/2026    DTaP/Tdap/Td vaccine (2 - Td or Tdap) 11/30/2027    Pneumococcal 0-64 years Vaccine  Completed    Hepatitis C screen  Completed    HIV screen  Completed    Hepatitis A vaccine  Aged Out    Hib vaccine  Aged Out    Meningococcal (ACWY) vaccine  Aged Out       Hemoglobin A1C (%)   Date Value   03/10/2022 9.8   05/06/2021 11.0   04/27/2021 10.7 (H)             ( goal A1C is < 7)   Microalb/Crt.  Ratio (mcg/mg creat)   Date Value   09/25/2018 12     LDL Cholesterol (mg/dL)   Date Value   12/02/2020            (goal LDL is <100)   AST (U/L)   Date Value   12/02/2020 22     ALT (U/L)   Date Value   12/02/2020 25     BUN (mg/dL)   Date Value   12/02/2020 11     BP Readings from Last 3 Encounters:   08/24/22 115/71   06/29/22 138/84   06/02/22 135/87          (goal 120/80)          Patient Active Problem List:     Class 2 severe obesity due to excess calories with serious comorbidity and body mass index (BMI) of 38.0 to 38.9 in adult [facility])     Hyperlipidemia Anxiety     Mucoid cyst of joint     Post-traumatic osteoarthritis of left hand     Essential hypertension     Fibromyalgia     Type 2 diabetes mellitus without complication, without long-term current use of insulin (HCC)     Vitamin D deficiency     CHARLES (obstructive sleep apnea)     Abnormal mammogram of left breast. (8/2020)     Acute viral conjunctivitis of both eyes

## 2022-10-14 ENCOUNTER — TELEPHONE (OUTPATIENT)
Dept: INTERNAL MEDICINE | Age: 57
End: 2022-10-14

## 2022-10-14 NOTE — TELEPHONE ENCOUNTER
Acute respiratory issue     Patient complains of symptoms of a URI, possible sinusitis  Symptoms for how long 4 days  What meds has pt tried Steamy showers, motrin, musinex  Does patient have asthma No  Is patient on inhalers Yes  Other symptoms include congestion, headache, cough described as non-productive, without wheezing, dyspnea or hemoptysis, productive of green/yellow sputum, low grade fever, nasal congestion, post nasal drip, productive cough with  clear  and green colored sputum, purulent nasal discharge, and sinus pressure  Is this sinus, cold or cough related Yes    *This condition is eligible for an eVisit. If not already active, sign patient up for MyChart to improve access and communication with the provider. *    Pt has upcoming appt on Tues 10/18/22. Writer asked pt if she by any chance do a covid test or would do an at home test. Pt said she doesn't believe in covid and will not be sticking anything up her nose.  Pt also said she would get very upset if nothing is called in writer told pt that she could not guarantee if anything will be called in but writer could send a message to up coming MD.

## 2022-10-14 NOTE — TELEPHONE ENCOUNTER
E-scribing request for Metformin. Pt has future appt. Health Maintenance   Topic Date Due    Low dose CT lung screening  Never done    Lipids  12/02/2021    Diabetic retinal exam  04/13/2022    Diabetic microalbuminuria test  05/06/2022    A1C test (Diabetic or Prediabetic)  06/10/2022    Diabetic foot exam  06/17/2022    Flu vaccine (1) Never done    Hepatitis B vaccine (1 of 3 - Risk 3-dose series) 10/18/2022 (Originally 6/28/1984)    Shingles vaccine (1 of 2) 10/29/2022 (Originally 6/28/2017)    COVID-19 Vaccine (1) 12/29/2022 (Originally 1965)    Depression Monitoring  03/10/2023    Breast cancer screen  08/17/2023    Colorectal Cancer Screen  08/19/2023    Cervical cancer screen  08/17/2026    DTaP/Tdap/Td vaccine (2 - Td or Tdap) 11/30/2027    Pneumococcal 0-64 years Vaccine  Completed    Hepatitis C screen  Completed    HIV screen  Completed    Hepatitis A vaccine  Aged Out    Hib vaccine  Aged Out    Meningococcal (ACWY) vaccine  Aged Out             (applicable per patient's age: Cancer Screenings, Depression Screening, Fall Risk Screening, Immunizations)    Hemoglobin A1C (%)   Date Value   03/10/2022 9.8   05/06/2021 11.0   04/27/2021 10.7 (H)     Microalb/Crt.  Ratio (mcg/mg creat)   Date Value   09/25/2018 12     LDL Cholesterol (mg/dL)   Date Value   12/02/2020          AST (U/L)   Date Value   12/02/2020 22     ALT (U/L)   Date Value   12/02/2020 25     BUN (mg/dL)   Date Value   12/02/2020 11      (goal A1C is < 7)   (goal LDL is <100) need 30-50% reduction from baseline     BP Readings from Last 3 Encounters:   08/24/22 115/71   06/29/22 138/84   06/02/22 135/87    (goal /80)      All Future Testing planned in [facility]:  Lab Frequency Next Occurrence   Lipid Panel Once 05/07/2022   Full PFT Study With Bronchodilator Once 10/29/2021   Microalbumin / Creatinine Urine Ratio Once 06/11/2022   Lipid Panel Once 06/11/2022   Comprehensive Metabolic Panel Once 14/21/6813   CBC Once 06/11/2022   ECHO Complete 2D W Doppler W Color Once 03/10/2022   EKG 12 lead Once 08/19/2022   T. pallidum Ab Once 08/24/2022   Hepatitis Panel, Acute Once 08/24/2022       Next Visit Date:  Future Appointments   Date Time Provider Nathalie Barraza   10/18/2022  3:00 PM [facility]   10/26/2022  3:15 PM Calista Gardner, [facility]            Patient Active Problem List:     Class 2 severe obesity due to excess calories with serious comorbidity and body mass index (BMI) of 38.0 to 38.9 in adult [facility])     Hyperlipidemia     Anxiety     Mucoid cyst of joint     Post-traumatic osteoarthritis of left hand     Essential hypertension     Fibromyalgia     Type 2 diabetes mellitus without complication, without long-term current use of insulin (HCC)     Vitamin D deficiency     CHARLES (obstructive sleep apnea)     Abnormal mammogram of left breast. (8/2020)     Acute viral conjunctivitis of both eyes

## 2022-10-18 ENCOUNTER — TELEPHONE (OUTPATIENT)
Dept: INTERNAL MEDICINE | Age: 57
End: 2022-10-18

## 2022-10-18 RX ORDER — BENZOYL PEROXIDE
KIT TOPICAL
Qty: 30 TABLET | Refills: 0 | OUTPATIENT
Start: 2022-10-18

## 2022-10-18 NOTE — TELEPHONE ENCOUNTER
1 month Refill for metformin sent however I am not PCP and further requests should be sent to pcp or patient can establish with me

## 2022-10-18 NOTE — LETTER
[address] [address]  Phone: [phone]  Fax: [phone] Shari Casillas MD        October 18, 2022     [address] [place] [id number]      Dear Malinda Rider: We missed seeing you for a scheduled appointment at [address] [place] [address] with Mike Suárez MD on 10/18/2022. We're sorry you were unable to keep your appointment and hope that you are doing well. We ask that you please call 24 hours in advance if you are unable to make your appointment, so that we can give that time to another patient in need. We care about you and the management of your healthcare and want to make sure that you follow up as recommended. To provide quality care and timely appointments to all our patients, you may be dismissed from the practice if you do not show for three (3) scheduled appointments within a 12-month period. We would like to continue treating your healthcare needs. Please call the office to reschedule your appointment, if needed.      Sincerely,        Prosper Fallon MD

## 2022-10-18 NOTE — TELEPHONE ENCOUNTER
Sounds consistent with viral URI. Should resolve in about a week. Continue current therapies can add nasal saline which is over the counter. If she develops high fever please inform the office. Can address with her in the office today. Abx not indicated according to these symptoms.

## 2022-10-26 ENCOUNTER — OFFICE VISIT (OUTPATIENT)
Dept: OBGYN | Age: 57
End: 2022-10-26
Payer: COMMERCIAL

## 2022-10-26 ENCOUNTER — HOSPITAL ENCOUNTER (OUTPATIENT)
Age: 57
Setting detail: SPECIMEN
Discharge: HOME | End: 2022-10-26

## 2022-10-26 VITALS
SYSTOLIC BLOOD PRESSURE: 151 MMHG | HEART RATE: 67 BPM | BODY MASS INDEX: 40.57 KG/M2 | DIASTOLIC BLOOD PRESSURE: 75 MMHG | WEIGHT: 229 LBS

## 2022-10-26 DIAGNOSIS — Z12.4 SCREENING FOR CERVICAL CANCER: ICD-10-CM

## 2022-10-26 DIAGNOSIS — B96.89 BV (BACTERIAL VAGINOSIS): ICD-10-CM

## 2022-10-26 DIAGNOSIS — N76.0 BV (BACTERIAL VAGINOSIS): Primary | ICD-10-CM

## 2022-10-26 DIAGNOSIS — N76.0 BV (BACTERIAL VAGINOSIS): ICD-10-CM

## 2022-10-26 DIAGNOSIS — E78.2 MIXED HYPERLIPIDEMIA: ICD-10-CM

## 2022-10-26 DIAGNOSIS — B96.89 BV (BACTERIAL VAGINOSIS): Primary | ICD-10-CM

## 2022-10-26 DIAGNOSIS — I10 ESSENTIAL HYPERTENSION: ICD-10-CM

## 2022-10-26 LAB
ALBUMIN SERPL-MCNC: 4.2 G/DL (ref 3.5–5.2)
ALBUMIN/GLOBULIN RATIO: 1.3 (ref 1–2.5)
ALP BLD-CCNC: 64 U/L (ref 35–104)
ALT SERPL-CCNC: 28 U/L (ref 5–33)
ANION GAP SERPL CALCULATED.3IONS-SCNC: 10 MMOL/L (ref 9–17)
AST SERPL-CCNC: 23 U/L
BILIRUB SERPL-MCNC: 0.3 MG/DL (ref 0.3–1.2)
BUN BLDV-MCNC: 14 MG/DL (ref 6–20)
CALCIUM SERPL-MCNC: 9.7 MG/DL (ref 8.6–10.4)
CHLORIDE BLD-SCNC: 105 MMOL/L (ref 98–107)
CHOLESTEROL/HDL RATIO: 7.8
CHOLESTEROL: 249 MG/DL
CO2: 24 MMOL/L (ref 20–31)
CREAT SERPL-MCNC: 0.8 MG/DL (ref 0.5–0.9)
CREATININE URINE: 158.4 MG/DL (ref 28–217)
GFR SERPL CREATININE-BSD FRML MDRD: >60 ML/MIN/1.73M2
GLUCOSE BLD-MCNC: 152 MG/DL (ref 70–99)
HDLC SERPL-MCNC: 32 MG/DL
LDL CHOLESTEROL: 151 MG/DL (ref 0–130)
MICROALBUMIN/CREAT 24H UR: 46 MG/L
MICROALBUMIN/CREAT UR-RTO: 29 MCG/MG CREAT
POTASSIUM SERPL-SCNC: 3.9 MMOL/L (ref 3.7–5.3)
SODIUM BLD-SCNC: 139 MMOL/L (ref 135–144)
TOTAL PROTEIN: 7.4 G/DL (ref 6.4–8.3)
TRIGL SERPL-MCNC: 330 MG/DL

## 2022-10-26 PROCEDURE — G8417 CALC BMI ABV UP PARAM F/U: HCPCS | Performed by: STUDENT IN AN ORGANIZED HEALTH CARE EDUCATION/TRAINING PROGRAM

## 2022-10-26 PROCEDURE — 3078F DIAST BP <80 MM HG: CPT | Performed by: STUDENT IN AN ORGANIZED HEALTH CARE EDUCATION/TRAINING PROGRAM

## 2022-10-26 PROCEDURE — 3074F SYST BP LT 130 MM HG: CPT | Performed by: STUDENT IN AN ORGANIZED HEALTH CARE EDUCATION/TRAINING PROGRAM

## 2022-10-26 PROCEDURE — G8427 DOCREV CUR MEDS BY ELIG CLIN: HCPCS | Performed by: STUDENT IN AN ORGANIZED HEALTH CARE EDUCATION/TRAINING PROGRAM

## 2022-10-26 PROCEDURE — G8484 FLU IMMUNIZE NO ADMIN: HCPCS | Performed by: STUDENT IN AN ORGANIZED HEALTH CARE EDUCATION/TRAINING PROGRAM

## 2022-10-26 PROCEDURE — 99213 OFFICE O/P EST LOW 20 MIN: CPT | Performed by: STUDENT IN AN ORGANIZED HEALTH CARE EDUCATION/TRAINING PROGRAM

## 2022-10-26 PROCEDURE — 3017F COLORECTAL CA SCREEN DOC REV: CPT | Performed by: STUDENT IN AN ORGANIZED HEALTH CARE EDUCATION/TRAINING PROGRAM

## 2022-10-26 PROCEDURE — 4004F PT TOBACCO SCREEN RCVD TLK: CPT | Performed by: STUDENT IN AN ORGANIZED HEALTH CARE EDUCATION/TRAINING PROGRAM

## 2022-10-26 NOTE — PROGRESS NOTES
Attending Physician Statement  I have discussed the care of Duarte Simons, including pertinent history and exam findings,  with the resident. I have reviewed the key elements of all parts of the encounter with the resident. I agree with the assessment, plan and orders as documented by the resident.   (GE Modifier)    Yessica Teresa, DO

## 2022-10-26 NOTE — PROGRESS NOTES
OB/GYN Problem Visit    Jethro Wei  10/26/2022                       Primary Care Physician: Weston Stauffer MD    CC:   Chief Complaint   Patient presents with    Annual Exam     Wants cultures done , Vag discharge that's yellow unsure how long          HPI: Jethro Wei is a 56 y.o. female [id number]    The patient was seen and examined. She is here for follow up cultures after having BV found on her annual exam. She was treated with Metrogel after poor tolerance of Flagyl. She reports she is still having some abnormal discharge that is yellow. She denies malodorous discharge, vaginal irritation, or pelvic pain. The patient is also adamant that she needs a pap smear for her insurance due to health maintenance. Discussed that her pap smear is up to date per ACOG and ASCCP guidelines (last pap smear NILM, HPV negative 8/17/21). Informed patient that her insurance may not cover repeat testing that is not indicated. However despite education on pap smear guidelines and information pertaining costs to the patient, she insisted on collection of pap smear today.        REVIEW OF SYSTEMS:   Constitutional: negative fever, negative chills  HEENT: negative visual disturbances, negative headaches  Respiratory: negative dyspnea, negative cough  Cardiovascular: negative chest pain,  negative palpitations  Gastrointestinal: negative abdominal pain, negative RUQ pain, negative N/V, negative diarrhea, negative constipation  Genitourinary: negative dysuria, positive vaginal discharge  Dermatological: negative rash  Hematologic: negative bruising  Immunologic/Lymphatic: negative recent illness, negative recent sick contact  Musculoskeletal: negative back pain, negative myalgias, negative arthralgias  Neurological:  negative dizziness, negative weakness  Behavior/Psych: negative depression, negative anxiety    ________________________________________________________________________      OBSTETRICAL HISTORY:  OB History    Para Term  AB Living   4 3 3   1 3   SAB IAB Ectopic Molar Multiple Live Births                    # Outcome Date GA Lbr Aldo/2nd Weight Sex Delivery Anes PTL Lv   4 Term            3 Term            2 Term            1 AB                PAST MEDICAL HISTORY:      Diagnosis Date    Anxiety 2017    Chronic back pain     COPD (chronic obstructive pulmonary disease) ([facility] [address]     Fibromyalgia 3/12/2019    GERD (gastroesophageal reflux disease)     resolved not taking medds    HSV-2 (herpes simplex virus 2) infection 2013    Hyperlipidemia     Hypertension     Neuropathy     feet    Obesity     CHARLES (obstructive sleep apnea) 2021    Osteoarthritis     Post-traumatic osteoarthritis of left hand     Type II or unspecified type diabetes mellitus without mention of complication, not stated as uncontrolled     Uncontrolled type 2 diabetes mellitus without complication, without long-term current use of insulin 2019    Urinary incontinence        PAST SURGICAL HISTORY:                                                                    Procedure Laterality Date    TONSILLECTOMY AND ADENOIDECTOMY      TUBAL LIGATION         MEDICATIONS:  Current Outpatient Medications   Medication Sig Dispense Refill    metFORMIN (GLUCOPHAGE) 1000 MG tablet Take 1 tab by mouth twice daily with food 60 tablet 0    pregabalin (LYRICA) 300 MG capsule TAKE 1 CAPSULE BY MOUTH TWICE DAILY      ibuprofen (ADVIL;MOTRIN) 800 MG tablet Take 1 tablet by mouth every 8 hours as needed for Pain 20 tablet 1    albuterol sulfate HFA (VENTOLIN HFA) 108 (90 Base) MCG/ACT inhaler Inhale 2 puffs into the lungs 4 times daily as needed for Wheezing 54 g 1    ferrous sulfate (IRON 325) 325 (65 Fe) MG tablet Take 325 mg by mouth daily      EQ ASPIRIN ADULT LOW DOSE 81 MG EC tablet Take 1 tablet by mouth once daily 90 tablet 1    traMADol (ULTRAM) 50 MG tablet       tiZANidine (ZANAFLEX) 4 MG tablet Take 1 tablet by mouth 2 times daily 60 tablet 0    blood glucose test strips (TRUE METRIX BLOOD GLUCOSE TEST) strip USE 1 STRIP TO CHECK GLUCOSE TWICE DAILY 100 strip 11    Lancets MISC USE 1 LANCET TO CHECK GLUCOSE TWICE DAILY 100 each 11    furosemide (LASIX) 40 MG tablet Take 1 tablet by mouth daily (Patient not taking: No sig reported) 30 tablet 3    potassium chloride (KLOR-CON M) 10 MEQ extended release tablet Take 1 tablet by mouth daily (Patient not taking: No sig reported) 30 tablet 5    atorvastatin (LIPITOR) 10 MG tablet Once daily (Patient not taking: No sig reported) 30 tablet 5    pregabalin (LYRICA) 200 MG capsule TAKE 1 TABLET BY MOUTH NIGHTLY FOR 30 DAYS (Patient not taking: No sig reported) 30 capsule 1    glimepiride (AMARYL) 2 MG tablet Take 1 tablet by mouth every morning (Patient not taking: No sig reported) 90 tablet 1     Current Facility-Administered Medications   Medication Dose Route Frequency Provider Last Rate Last Admin    cyanocobalamin injection 1,000 mcg  1,000 mcg IntraMUSCular Once Dom Richard MD           ALLERGIES:  Allergies as of 10/26/2022 - Fully Reviewed 10/26/2022   Allergen Reaction Noted    Latex Hives 06/16/2017    Vicodin [hydrocodone-acetaminophen] Hives and Itching 01/22/2013    Zocor [simvastatin]  12/13/2011    Lisinopril  12/23/2011                                   VITALS:  Vitals:    10/26/22 1556   BP: (!) 151/75   Pulse: 67   Weight: 229 lb (103.9 kg)                                                                                                                                                                           PHYSICAL EXAM:   Chaperone for Intimate Exam: Chaperone was present for entire exam, Chaperone Name: Iwona Edman, [place]    General Appearance: Appears healthy. Alert; in no acute distress. Pleasant. Skin: Normal  Lymphatic: No cervical, superclavicular, axillary, or inguinal adenopathy.   HEENT: normocephalic and atraumatic, Thyroid normal to inspection  Respiratory: Nonlabored respirations, no conversational dyspnea  Cardiovascular: Regular rate, distal pulses intact, noncyanotic extremities  Abdomen: soft, non-tender, and non-distended  Pelvic Exam:   External genitalia: General appearance; normal, Hair distribution; normal, Lesions absent  Urinary system: urethral meatus normal  Vaginal: normal mucosa, thin, watery discharge   Cervix: normal appearing cervix without discharge or lesions  Rectal Exam: deferred  Extremities: non-tender BLE and non-edematous  Musculoskeletal: no gross abnormalities  Psych: Alert and oriented, appropriate affect. DATA:  No results found for this visit on 10/26/22.         ASSESSMENT & PLAN:    Jaymie Wick is a 56 y.o. female [id number] here for repeat cultures due to [address] and pap smear for insurance health maintenance    - Last annual exam 8/2022   - Pap smear up to date: 8/17/21 NILM, HPV neg   - Mammogram up to date: 8/17/22 BIRADS-2   - BV on most recent vaginal cultures, treated with Metrogel    - GC/C and vaginitis collected today    - Pap smear collected today due to patient's insistent despite adequate counseling about cervical cancer screening guidelines   - Will call patient with results per her request   - Follow up in 8/2023 for annual exam or sooner as needed      Patient Active Problem List    Diagnosis Date Noted    Hyperlipidemia 12/13/2011     Priority: High    Acute viral conjunctivitis of both eyes 06/29/2022     Priority: Medium    Class 2 severe obesity due to excess calories with serious comorbidity and body mass index (BMI) of 38.0 to 38.9 in adult ([facility] [address] 12/13/2011     Priority: Medium    Vitamin D deficiency 02/19/2021    CHARLES (obstructive sleep apnea) 02/19/2021    Abnormal mammogram of left breast. (8/2020) 02/19/2021    Type 2 diabetes mellitus without complication, without long-term current use of insulin ([facility] [address] 05/22/2019    Fibromyalgia 03/12/2019    Essential hypertension 04/27/2018    Mucoid cyst of joint 06/14/2017    Post-traumatic osteoarthritis of left hand 06/14/2017    Anxiety 02/23/2017       No follow-ups on file. Counseling Completed:    Counseled about need for repeat pap as per American Society for Colposcopy and Cervical Pathology guidelines. Counseled about need for mammograms every 1 year, If >44 yo and last mammogram was negative. Patient was seen with total face to face time of 20 minutes. More than 50% of this visit was on counseling and education regarding her diagnose(s) as listed below and options. She was also counseled on her preventative health maintenance recommendations and follow-up. Diagnosis Orders   1. BV (bacterial vaginosis)  Vaginitis DNA Probe    Chlamydia Trachomatis & Neisseria gonorrhoeae (GC) by amplified detection      2.  Screening for cervical cancer  PAP [address],   Ob/Gyn Resident  [facility], [id number]  10/26/2022, 5:41 PM

## 2022-11-02 LAB — CYTOLOGY REPORT: NORMAL

## 2022-11-07 ENCOUNTER — TELEPHONE (OUTPATIENT)
Dept: OBGYN | Age: 57
End: 2022-11-07

## 2022-11-07 RX ORDER — METRONIDAZOLE 7.5 MG/G
GEL VAGINAL
Qty: 70 G | Refills: 0 | Status: SHIPPED | OUTPATIENT
Start: 2022-11-07 | End: 2022-11-14

## 2022-11-07 NOTE — TELEPHONE ENCOUNTER
Pt called because she tested positive for BV and  Informed her to use her metrogel that she already has.   Patient states she does not have an applicator for it and called the pharmacy to see if they could give her a new applicator and the pharmacy informed her that she would be a new RX to be sent to them ([facility] on [address])

## 2022-11-16 ENCOUNTER — OFFICE VISIT (OUTPATIENT)
Dept: INTERNAL MEDICINE | Age: 57
End: 2022-11-16
Payer: COMMERCIAL

## 2022-11-16 VITALS
DIASTOLIC BLOOD PRESSURE: 89 MMHG | WEIGHT: 233 LBS | OXYGEN SATURATION: 98 % | SYSTOLIC BLOOD PRESSURE: 157 MMHG | TEMPERATURE: 97.3 F | HEIGHT: 63 IN | BODY MASS INDEX: 41.29 KG/M2 | HEART RATE: 66 BPM

## 2022-11-16 DIAGNOSIS — R06.02 SHORTNESS OF BREATH: ICD-10-CM

## 2022-11-16 DIAGNOSIS — J30.9 ALLERGIC RHINITIS, UNSPECIFIED SEASONALITY, UNSPECIFIED TRIGGER: ICD-10-CM

## 2022-11-16 DIAGNOSIS — G47.33 OSA (OBSTRUCTIVE SLEEP APNEA): ICD-10-CM

## 2022-11-16 DIAGNOSIS — F17.200 SMOKER: ICD-10-CM

## 2022-11-16 DIAGNOSIS — I10 ESSENTIAL HYPERTENSION: ICD-10-CM

## 2022-11-16 DIAGNOSIS — R05.3 CHRONIC COUGH: ICD-10-CM

## 2022-11-16 DIAGNOSIS — E11.9 TYPE 2 DIABETES MELLITUS WITHOUT COMPLICATION, WITHOUT LONG-TERM CURRENT USE OF INSULIN (HCC): Primary | ICD-10-CM

## 2022-11-16 DIAGNOSIS — M79.7 FIBROMYALGIA: ICD-10-CM

## 2022-11-16 DIAGNOSIS — E66.01 CLASS 3 SEVERE OBESITY DUE TO EXCESS CALORIES WITH SERIOUS COMORBIDITY AND BODY MASS INDEX (BMI) OF 40.0 TO 44.9 IN ADULT (HCC): ICD-10-CM

## 2022-11-16 DIAGNOSIS — E78.2 MIXED HYPERLIPIDEMIA: ICD-10-CM

## 2022-11-16 DIAGNOSIS — Z87.891 PERSONAL HISTORY OF TOBACCO USE: ICD-10-CM

## 2022-11-16 LAB — HBA1C MFR BLD: 8.4 %

## 2022-11-16 PROCEDURE — G0296 VISIT TO DETERM LDCT ELIG: HCPCS | Performed by: STUDENT IN AN ORGANIZED HEALTH CARE EDUCATION/TRAINING PROGRAM

## 2022-11-16 PROCEDURE — 99211 OFF/OP EST MAY X REQ PHY/QHP: CPT | Performed by: STUDENT IN AN ORGANIZED HEALTH CARE EDUCATION/TRAINING PROGRAM

## 2022-11-16 PROCEDURE — 83036 HEMOGLOBIN GLYCOSYLATED A1C: CPT | Performed by: STUDENT IN AN ORGANIZED HEALTH CARE EDUCATION/TRAINING PROGRAM

## 2022-11-16 RX ORDER — BUDESONIDE AND FORMOTEROL FUMARATE DIHYDRATE 80; 4.5 UG/1; UG/1
2 AEROSOL RESPIRATORY (INHALATION) 2 TIMES DAILY
Qty: 10.2 G | Refills: 3 | Status: SHIPPED | OUTPATIENT
Start: 2022-11-16

## 2022-11-16 RX ORDER — ATORVASTATIN CALCIUM 20 MG/1
20 TABLET, FILM COATED ORAL DAILY
Qty: 30 TABLET | Refills: 5 | Status: SHIPPED | OUTPATIENT
Start: 2022-11-16

## 2022-11-16 RX ORDER — ALBUTEROL SULFATE 90 UG/1
2 AEROSOL, METERED RESPIRATORY (INHALATION) 4 TIMES DAILY PRN
Qty: 54 G | Refills: 1 | Status: SHIPPED | OUTPATIENT
Start: 2022-11-16

## 2022-11-16 RX ORDER — LORATADINE 10 MG/1
10 TABLET ORAL DAILY
Qty: 30 TABLET | Refills: 5 | Status: SHIPPED | OUTPATIENT
Start: 2022-11-16 | End: 2023-05-15

## 2022-11-16 ASSESSMENT — PATIENT HEALTH QUESTIONNAIRE - PHQ9
2. FEELING DOWN, DEPRESSED OR HOPELESS: 3
SUM OF ALL RESPONSES TO PHQ QUESTIONS 1-9: 19
7. TROUBLE CONCENTRATING ON THINGS, SUCH AS READING THE NEWSPAPER OR WATCHING TELEVISION: 3
SUM OF ALL RESPONSES TO PHQ QUESTIONS 1-9: 19
SUM OF ALL RESPONSES TO PHQ QUESTIONS 1-9: 19
9. THOUGHTS THAT YOU WOULD BE BETTER OFF DEAD, OR OF HURTING YOURSELF: 0
10. IF YOU CHECKED OFF ANY PROBLEMS, HOW DIFFICULT HAVE THESE PROBLEMS MADE IT FOR YOU TO DO YOUR WORK, TAKE CARE OF THINGS AT HOME, OR GET ALONG WITH OTHER PEOPLE: 0
1. LITTLE INTEREST OR PLEASURE IN DOING THINGS: 2
6. FEELING BAD ABOUT YOURSELF - OR THAT YOU ARE A FAILURE OR HAVE LET YOURSELF OR YOUR FAMILY DOWN: 3
SUM OF ALL RESPONSES TO PHQ QUESTIONS 1-9: 19
5. POOR APPETITE OR OVEREATING: 2
8. MOVING OR SPEAKING SO SLOWLY THAT OTHER PEOPLE COULD HAVE NOTICED. OR THE OPPOSITE, BEING SO FIGETY OR RESTLESS THAT YOU HAVE BEEN MOVING AROUND A LOT MORE THAN USUAL: 0
SUM OF ALL RESPONSES TO PHQ9 QUESTIONS 1 & 2: 5
3. TROUBLE FALLING OR STAYING ASLEEP: 3
4. FEELING TIRED OR HAVING LITTLE ENERGY: 3

## 2022-11-16 NOTE — PROGRESS NOTES
[facility]    New Patient Note/History and Physical    Date of patient's visit: 12/7/2022    Name: Michelle Miller      YOB: 1965    Patient Care Team:  Michaela Acuna MD as PCP - General (Internal Medicine)  Michaela Acuna MD as PCP - [facility] Empaneled Provider  Jay Real MD as Referring Physician (Internal Medicine)    REASON FOR VISIT: First Visit, establish care     Chief Complaint   Patient presents with    New Patient    Health Maintenance     Refused flu vaccine, eye exam done at [address],depression screening 19       HISTORY OF PRESENTING ILLNESS:    History was obtained from the patient. Michelle Miller is a 56 y.o. is here to establish care with new physician in practice. Previously under Dr. Melody Sherman but was dismissed from her practice. Overall per prior physician documentation pt often refuses medications. PMH is notable for:  - Poorly controlled diabetes. On metformin 1000 mg BID. Pt not taking glimperide and not checking BG. Has previously declined other therapies especially injectable medications such as insulin, GLP-1 agonist. POC A1c 8.4 in office improved from 9.8 in March 2022. Pt states she cut out fast food since last visit. However, she attributes improved A1c more to an \"immune boosting formula\" that a \"homeopath in [place]\" is providing her with. She took it from March - June but stopped as she could not afford it anymore. She was unable to tell me what this supplement was made of or the name of the provider. She states the homeopathic provider was \"taught by God\". Her DM is c/b peripheral neuropathy.   - Uncontrolled HTN. BP in office elevated today and has been in the past. Pt states she doesn't have HTN and its just anxiety. She is on lasix but states it is for lower extremity edema, not HTN.  Denies CP, vision changes, headaches. - HLD with most recent lipid panel in Oct 2022 with , , . Prescribed statin in past but does not take it. Simvastatin is listed as allergy but unclear what reaction was. - Fibromyalgia - follows with rheumatology Dr Jolie Baumgarten. On Tramadol, Zanaflex, & Lyrica prescribed by him. - Possible COPD. Pt endorses h/o SOB & productive cough in setting of long term smoker. Uses albuterol every day. Has been referred to pulm & for PFTs in past but did not complete as she refused to be COVID tested and could not complete PFT/pulm eval without this. - Current smoker- Has been smoking for >40 years. Is working on [employer] back an states she currently smoking less than 1/2 PPD. States she has tried nicotine replacement, chantix & wellbutrin. Declines any assistance today in cessation.  - CHARLES with last titration in 2019. She states she did not get CPAP and does not want a CPAP. Continues to wake up gasping & has daytime fatigue.   - Lower extremity edema. Takes lasix. Previously had echo ordered but was not completed. Her concerns today include her weight. She is requesting Adipex. Was prescribed 3 months of Adipex from April-June 2022. Per review of documented weight did not have much weight loss with this.     PAST MEDICALAND SURGICAL HISTORY:          Diagnosis Date    Anxiety 2/23/2017    Chronic back pain     COPD (chronic obstructive pulmonary disease) (HCC)     Fibromyalgia 3/12/2019    GERD (gastroesophageal reflux disease)     resolved not taking medds    HSV-2 (herpes simplex virus 2) infection 7/2/2013    Hyperlipidemia     Hypertension     Neuropathy     feet    Obesity     CHARLES (obstructive sleep apnea) 2/19/2021    Osteoarthritis     Post-traumatic osteoarthritis of left hand     Type II or unspecified type diabetes mellitus without mention of complication, not stated as uncontrolled     Uncontrolled type 2 diabetes mellitus without complication, without long-term current use of insulin 5/22/2019    Urinary incontinence          Procedure Laterality Date    TONSILLECTOMY AND ADENOIDECTOMY      TUBAL LIGATION       SOCIAL HISTORY:    TOBACCO:   reports that she has been smoking cigarettes. She started smoking about 43 years ago. She has a 42.00 pack-year smoking history. She has never used smokeless tobacco.  ETOH:   reports that she does not currently use alcohol after a past usage of about 0.8 - 1.7 standard drinks per week. DRUGS:  reports no history of drug use.     ALEJANDRA     Allergies   Allergen Reactions    Latex Hives    Vicodin [Hydrocodone-Acetaminophen] Hives and Itching    Zocor [Simvastatin]     Lisinopril      Cough         HOME MEDICATION:      Current Outpatient Medications on File Prior to Visit   Medication Sig Dispense Refill    metFORMIN (GLUCOPHAGE) 1000 MG tablet Take 1 tab by mouth twice daily with food 60 tablet 0    pregabalin (LYRICA) 300 MG capsule TAKE 1 CAPSULE BY MOUTH TWICE DAILY      ibuprofen (ADVIL;MOTRIN) 800 MG tablet Take 1 tablet by mouth every 8 hours as needed for Pain 20 tablet 1    ferrous sulfate (IRON 325) 325 (65 Fe) MG tablet Take 325 mg by mouth daily      EQ ASPIRIN ADULT LOW DOSE 81 MG EC tablet Take 1 tablet by mouth once daily 90 tablet 1    traMADol (ULTRAM) 50 MG tablet       tiZANidine (ZANAFLEX) 4 MG tablet Take 1 tablet by mouth 2 times daily 60 tablet 0    blood glucose test strips (TRUE METRIX BLOOD GLUCOSE TEST) strip USE 1 STRIP TO CHECK GLUCOSE TWICE DAILY 100 strip 11    Lancets MISC USE 1 LANCET TO CHECK GLUCOSE TWICE DAILY 100 each 11     Current Facility-Administered Medications on File Prior to Visit   Medication Dose Route Frequency Provider Last Rate Last Admin    cyanocobalamin injection 1,000 mcg  1,000 mcg IntraMUSCular Once Shreya Corley MD           FAMILY HISTORY:          Problem Relation Age of Onset    Diabetes Mother     Hypertension Mother     Cancer Father 76        rectal cancer    Breast Cancer Sister 22        had a double mastectomy    Diabetes Maternal Grandmother     Colon Cancer Neg Hx     Eclampsia Neg Hx     Ovarian Cancer Neg Hx      Labor Neg Hx     Spont Abortions Neg Hx     Stroke Neg Hx      REVIEW OF SYSTEMS:    Review of Systems as noted in HPI. PHYSICAL EXAM:      Vitals:    22 1522 22 1527   BP: (!) 144/88 (!) 157/89   Pulse: 66    Temp: 97.3 °F (36.3 °C)    TempSrc: Temporal    SpO2: 98%    Weight: 233 lb (105.7 kg)    Height: 5' 3\" (1.6 m)      Wt Readings from Last 3 Encounters:   22 233 lb (105.7 kg)   10/26/22 229 lb (103.9 kg)   22 227 lb (103 kg)     Body mass index is 41.27 kg/m². Physical Exam  Constitutional:       General: She is not in acute distress. Appearance: She is obese. HENT:      Head: Normocephalic and atraumatic. Eyes:      General: No scleral icterus. Conjunctiva/sclera: Conjunctivae normal.   Cardiovascular:      Rate and Rhythm: Normal rate and regular rhythm. Pulses: Normal pulses. Heart sounds: Normal heart sounds. Pulmonary:      Effort: Pulmonary effort is normal. No respiratory distress. Breath sounds: Normal breath sounds. No wheezing. Musculoskeletal:      Cervical back: Normal range of motion and neck supple. Skin:     General: Skin is warm and dry. Neurological:      Mental Status: She is alert. ASSESSMENT AND PLAN:   Abdoulaye Richardson is a 56 y.o. is here to establish care with new physician in practice. #Type 2 diabetes mellitus without complication, without long-term current use of insulin (HCC)  Poorly controlled. A1c 8.4 today. On metformin 1000 mg BID. Pt not taking & refusing glimperide and not checking BG. Has previously declined other therapies especially injectable medications such as insulin, GLP-1 agonist.  - Continue metformin 1000 mg BID.  - Declined additional diabetic medications today  - Agreeable to statin as below  - Declined ARB.  Previously developed cough with ACE-I.   - KERRY completed in Oct 2022.   - Encouraged completion of diabetic retinal exam.    #Chronic cough  #Shortness of breath  #Smoker  #Possible COPD. Pt endorses h/o SOB & productive cough in setting of long term smoker. Possible COPD but no PFTs to review. Uses albuterol every day. Has been referred to pulm & for PFTs in past but did not complete as she refused to be COVID tested and could not complete PFT/pulm eval without this. Will start ICS-LABA to see if reduces need for SELIN. Encouraged her to schedule with pulm & reordered PFTs. - Full PFT Study With Bronchodilator  - budesonide-formoterol (SYMBICORT) 80-4.5 MCG/ACT AERO; Inhale 2 puffs into the lungs 2 times daily  Dispense: 10.2 g; Refill: 3  - albuterol sulfate HFA (VENTOLIN HFA) 108 (90 Base) MCG/ACT inhaler; Inhale 2 puffs into the lungs 4 times daily as needed for Wheezing  Dispense: 54 g; Refill: 1    #Class 3 severe obesity due to excess calories with serious comorbidity and body mass index (BMI) of 40.0 to 44.9 in adult [facility])  Discussed with pt options for weight management. I declined to prescribe Adipex given that she already received 12 weeks of treatment within the last 6 months and did not have much objective efficacy. Reviewed other options such as GLP-1 agonist which would have benefit to her uncontrolled T2DM vs Bupropion/Naltrexone which would have added benefit of assistance with smoking cessation. However, with pt being on tramadol would need to be reassessed. Pt declined both. #Mixed hyperlipidemia  Not controlled and non compliant with statin that was prescribed. Questionable reaction to simvastatin in past. Recent . Discussed that pt is high risk for CVD given her diabetes, HTN, smoking & HLD. Pt agreeable to try statin again.   - RESUME atorvastatin (LIPITOR) 20 MG tablet; Take 1 tablet by mouth daily  Dispense: 30 tablet; Refill: 5    #CHARLES (obstructive sleep apnea)  Declined CPAP.  Will continue to address at f/u.    #Essential hypertension  Uncontrolled. BP in office elevated today and has been in the past. Pt states she doesn't have HTN and its just anxiety. She is on lasix but states it is for lower extremity edema, not HTN. Discussed additional medication which pt declined. #Fibromyalgia  - Continue management per rheumatology Dr Meredith Rodriguez. On Tramadol, Zanaflex, & Lyrica prescribed by him. #Allergic rhinitis, unspecified seasonality, unspecified trigger  Controlled with oral antihistamines. Refilled today. - loratadine (CLARITIN) 10 MG tablet; Take 1 tablet by mouth daily  Dispense: 30 tablet; Refill: 5    #Personal history of tobacco use  Due for lung CA screening.  - MD VISIT TO DISCUSS LUNG CA SCREEN W LDCT  - CT Lung Screening; Future    Return in about 3 months (around 2/16/2023). A total of 75 minutes was spent on the date of the encounter providing care for the patient including reviewing previous notes, counseling the patient on above conditions,  ordering tests as noted above, adjusting meds, and documenting the findings in the note.      David Ospina MD

## 2022-11-16 NOTE — PROGRESS NOTES
Adipex wt loss. A1c 8.4 Takes metformin. Pt not taking glimperide and not checking BG. Pt states she cut out fast food and attributes changes more ***. COPD? , shortness of breath & cough. Uses albuterol every day. Cutting back less than 1/2 PPD. States she has tried nicotine repalcemnet, chantix & wellbutrin. Wakes up gasping, daytime fatigue. CHARLES, did not get CPAP and does not want a CPAP. Declined testing    BP in office elevated but states she doesn't have HTN and its just anxiety. Lasix for MIKE, previously had echo ordered but was not completed.       \"Taught by God\"    Level 5

## 2022-11-16 NOTE — PATIENT INSTRUCTIONS
Whats the [employer]? How much do we know about omega-3 fatty acids (omega-3s)? Extensive research has been done on omega-3s, especially the types found in seafood (fish and shellfish) and fish oil supplements. What do we know about the effectiveness of omega-3 supplements? Research indicates that omega-3 supplements dont reduce the risk of heart disease. However, people who eat seafood one to four times a week are less likely to die of heart disease. High doses of omega-3s can reduce levels of triglycerides. Omega-3 supplements may help relieve symptoms of rheumatoid arthritis. Omega-3 supplements have not been convincingly shown to slow the progression of the eye disease age-related macular degeneration. For most other conditions for which omega-3 supplements have been studied, the evidence is inconclusive or doesnt indicate that omega-3s are beneficial.  What do we know about the safety of omega-3 supplements? Omega-3s usually produce only mild side effects, if any. Theres conflicting evidence on whether omega-3s might influence the risk of prostate cancer. If youre taking medicine that affects blood clotting or if youre allergic to fish or shellfish, consult your health care provider before taking omega-3 supplements    Medications have been e-scribed to pharmacy of patients choice.       -TAQUERIA Goldstein

## 2023-01-04 DIAGNOSIS — R05.3 CHRONIC COUGH: Primary | ICD-10-CM

## 2023-01-04 DIAGNOSIS — F17.200 SMOKER: ICD-10-CM

## 2023-01-04 DIAGNOSIS — R06.02 SHORTNESS OF BREATH: ICD-10-CM

## 2023-01-04 NOTE — TELEPHONE ENCOUNTER
Received notification that pt's insurance does not cover Symbicort. Preferred medications are: Advair HFA, Breztri Aerosphere, or Flovent HFA. Please choose from one of these and e-scribe to pharmacy - pt uses [facility]. Thank you.

## 2023-01-06 RX ORDER — BUDESONIDE, GLYCOPYRROLATE, AND FORMOTEROL FUMARATE 160; 9; 4.8 UG/1; UG/1; UG/1
AEROSOL, METERED RESPIRATORY (INHALATION)
Status: CANCELLED | OUTPATIENT
Start: 2023-01-06

## 2023-01-06 RX ORDER — FLUTICASONE PROPIONATE AND SALMETEROL 100; 50 UG/1; UG/1
1 POWDER RESPIRATORY (INHALATION) EVERY 12 HOURS
Status: CANCELLED | OUTPATIENT
Start: 2023-01-06

## 2023-01-06 RX ORDER — FLUTICASONE PROPIONATE 110 UG/1
2 AEROSOL, METERED RESPIRATORY (INHALATION) 2 TIMES DAILY
Qty: 12 G | Refills: 3 | Status: CANCELLED | OUTPATIENT
Start: 2023-01-06 | End: 2024-01-06

## 2023-01-09 NOTE — TELEPHONE ENCOUNTER
PC to pt to discuss inhaler sent in and request pt complete PFTs, no answer. Unable to [facility] mailbox full.

## 2023-01-10 NOTE — TELEPHONE ENCOUNTER
Spoke with pt, states PFTs were scheduled but it was during the time we were having extremely cold weather and pt didn't want to risk going out. States they are supposed to call her back, but pt took scheduling number from writer just in case.

## 2023-01-30 RX ORDER — ASPIRIN 81 MG/1
81 TABLET ORAL DAILY
Qty: 90 TABLET | Refills: 1 | Status: SHIPPED | OUTPATIENT
Start: 2023-01-30

## 2023-01-30 NOTE — TELEPHONE ENCOUNTER
Request for Aspirin. Next Visit Date:  Future Appointments   Date Time Provider Nathalie Barraza   2/7/2023  3:00 PM [facility] PULM FUNCTION [facility] PFT Tania Deshauner   2/17/2023  3:20 PM Edson Holden MD [facility] IM [address] Maintenance   Topic Date Due    COVID-19 Vaccine (1) Never done    Low dose CT lung screening  Never done    Diabetic retinal exam  04/13/2022    Diabetic foot exam  06/17/2022    Flu vaccine (1) 06/30/2023 (Originally 8/1/2022)    Hepatitis B vaccine (1 of 3 - Risk 3-dose series) 12/02/2023 (Originally 6/28/1986)    Shingles vaccine (1 of 2) 12/02/2023 (Originally 6/28/2017)    Breast cancer screen  08/17/2023    Colorectal Cancer Screen  08/19/2023    Diabetic Alb to Cr ratio (uACR) test  10/26/2023    Lipids  10/26/2023    GFR test (Diabetes, CKD 3-4, OR last GFR 15-59)  10/26/2023    A1C test (Diabetic or Prediabetic)  11/16/2023    Depression Monitoring  11/16/2023    Cervical cancer screen  10/26/2027    DTaP/Tdap/Td vaccine (2 - Td or Tdap) 11/30/2027    Pneumococcal 0-64 years Vaccine  Completed    Hepatitis C screen  Completed    HIV screen  Completed    Hepatitis A vaccine  Aged Out    Hib vaccine  Aged Out    Meningococcal (ACWY) vaccine  Aged Out       Hemoglobin A1C (%)   Date Value   11/16/2022 8.4   03/10/2022 9.8   05/06/2021 11.0             ( goal A1C is < 7)   Microalb/Crt.  Ratio (mcg/mg creat)   Date Value   10/26/2022 29 (H)     LDL Cholesterol (mg/dL)   Date Value   10/26/2022 151 (H)       (goal LDL is <100)   AST (U/L)   Date Value   10/26/2022 23     ALT (U/L)   Date Value   10/26/2022 28     BUN (mg/dL)   Date Value   10/26/2022 14     BP Readings from Last 3 Encounters:   11/16/22 (!) 157/89   10/26/22 (!) 151/75   08/24/22 115/71          (goal 120/80)    All Future Testing planned in [facility]  Lab Frequency Next Occurrence   CBC Once 06/11/2022   ECHO Complete 2D W Doppler W Color Once 03/10/2022   T. pallidum Ab Once 08/24/2022   Hepatitis Panel, Acute Once 08/24/2022   PAP Smear Once 10/26/2022   CT Lung Screening Once 11/16/2022   Full PFT Study With Bronchodilator Once 12/07/2022         Patient Active Problem List:     Class 3 severe obesity due to excess calories with serious comorbidity and body mass index (BMI) of 40.0 to 44.9 in adult [facility])     Hyperlipidemia     Anxiety     Mucoid cyst of joint     Post-traumatic osteoarthritis of left hand     Essential hypertension     Fibromyalgia     Type 2 diabetes mellitus without complication, without long-term current use of insulin ([facility])     Vitamin D deficiency     CHARLES (obstructive sleep apnea)     Abnormal mammogram of left breast. (8/2020)

## 2023-02-07 ENCOUNTER — HOSPITAL ENCOUNTER (OUTPATIENT)
Dept: PULMONOLOGY | Age: 58
Discharge: HOME | End: 2023-02-07
Payer: MEDICARE

## 2023-02-07 DIAGNOSIS — R06.02 SHORTNESS OF BREATH: ICD-10-CM

## 2023-02-07 DIAGNOSIS — R05.3 CHRONIC COUGH: ICD-10-CM

## 2023-02-07 DIAGNOSIS — F17.200 SMOKER: ICD-10-CM

## 2023-02-07 LAB
DLCO %PRED: NORMAL
DLCO PRED: NORMAL
DLCO/VA %PRED: NORMAL
DLCO/VA PRED: NORMAL
DLCO/VA: NORMAL
DLCO: NORMAL
EXPIRATORY TIME-POST: NORMAL
EXPIRATORY TIME: NORMAL
FEF 25-75% %CHNG: NORMAL
FEF 25-75% %PRED-POST: NORMAL
FEF 25-75% %PRED-PRE: NORMAL
FEF 25-75% PRED: NORMAL
FEF 25-75%-POST: NORMAL
FEF 25-75%-PRE: NORMAL
FEV1 %PRED-POST: NORMAL
FEV1 %PRED-PRE: NORMAL
FEV1 PRED: NORMAL
FEV1-POST: NORMAL
FEV1-PRE: NORMAL
FEV1/FVC %PRED-POST: NORMAL
FEV1/FVC %PRED-PRE: NORMAL
FEV1/FVC PRED: NORMAL
FEV1/FVC-POST: NORMAL
FEV1/FVC-PRE: NORMAL
FVC %PRED-POST: NORMAL
FVC %PRED-PRE: NORMAL
FVC PRED: NORMAL
FVC-POST: NORMAL
FVC-PRE: NORMAL
GAW %PRED: NORMAL
GAW PRED: NORMAL
GAW: NORMAL
IC %PRED: NORMAL
IC PRED: NORMAL
IC: NORMAL
MEP: NORMAL
MIP: NORMAL
MVV %PRED-PRE: NORMAL
MVV PRED: NORMAL
MVV-PRE: NORMAL
PEF %PRED-POST: NORMAL
PEF %PRED-PRE: NORMAL
PEF PRED: NORMAL
PEF%CHNG: NORMAL
PEF-POST: NORMAL
PEF-PRE: NORMAL
RAW %PRED: NORMAL
RAW PRED: NORMAL
RAW: NORMAL
RV %PRED: NORMAL
RV PRED: NORMAL
RV: NORMAL
SVC %PRED: NORMAL
SVC PRED: NORMAL
SVC: NORMAL
TLC %PRED: NORMAL
TLC PRED: NORMAL
TLC: NORMAL
VA %PRED: NORMAL
VA PRED: NORMAL
VA: NORMAL
VTG %PRED: NORMAL
VTG PRED: NORMAL
VTG: NORMAL

## 2023-02-07 PROCEDURE — 94060 EVALUATION OF WHEEZING: CPT

## 2023-02-07 PROCEDURE — 6370000000 HC RX 637 (ALT 250 FOR IP): Performed by: STUDENT IN AN ORGANIZED HEALTH CARE EDUCATION/TRAINING PROGRAM

## 2023-02-07 PROCEDURE — 94729 DIFFUSING CAPACITY: CPT

## 2023-02-07 PROCEDURE — 94726 PLETHYSMOGRAPHY LUNG VOLUMES: CPT

## 2023-02-07 RX ORDER — ALBUTEROL SULFATE 90 UG/1
2 AEROSOL, METERED RESPIRATORY (INHALATION) ONCE
Status: COMPLETED | OUTPATIENT
Start: 2023-02-07 | End: 2023-02-07

## 2023-02-07 RX ADMIN — ALBUTEROL SULFATE 2 PUFF: 90 AEROSOL, METERED RESPIRATORY (INHALATION) at 15:32

## 2023-03-07 DIAGNOSIS — F17.200 SMOKER: ICD-10-CM

## 2023-03-07 DIAGNOSIS — R05.3 CHRONIC COUGH: ICD-10-CM

## 2023-03-08 RX ORDER — ALBUTEROL SULFATE 90 UG/1
AEROSOL, METERED RESPIRATORY (INHALATION)
Qty: 54 G | Refills: 0 | Status: SHIPPED | OUTPATIENT
Start: 2023-03-08

## 2023-03-08 NOTE — TELEPHONE ENCOUNTER
Request for albuterol. Next ana on 3/28/23    Next Visit Date:  Future Appointments   Date Time Provider Nathalie Christi   3/28/2023  2:40 PM Bev Bronson MD [facility]   Topic Date Due    COVID-19 Vaccine (1) Never done    Low dose CT lung screening  Never done    Diabetic retinal exam  04/13/2022    Diabetic foot exam  06/17/2022    Annual Wellness Visit (AWV)  Never done    Flu vaccine (1) 06/30/2023 (Originally 8/1/2022)    Hepatitis B vaccine (1 of 3 - Risk 3-dose series) 12/02/2023 (Originally 6/28/1986)    Shingles vaccine (1 of 2) 12/02/2023 (Originally 6/28/2017)    Breast cancer screen  08/17/2023    Colorectal Cancer Screen  08/19/2023    Diabetic Alb to Cr ratio (uACR) test  10/26/2023    Lipids  10/26/2023    GFR test (Diabetes, CKD 3-4, OR last GFR 15-59)  10/26/2023    A1C test (Diabetic or Prediabetic)  11/16/2023    Depression Monitoring  11/16/2023    Cervical cancer screen  10/26/2027    DTaP/Tdap/Td vaccine (2 - Td or Tdap) 11/30/2027    Pneumococcal 0-64 years Vaccine  Completed    Hepatitis C screen  Completed    HIV screen  Completed    Hepatitis A vaccine  Aged Out    Hib vaccine  Aged Out    Meningococcal (ACWY) vaccine  Aged Out       Hemoglobin A1C (%)   Date Value   11/16/2022 8.4   03/10/2022 9.8   05/06/2021 11.0             ( goal A1C is < 7)   Microalb/Crt.  Ratio (mcg/mg creat)   Date Value   10/26/2022 29 (H)     LDL Cholesterol (mg/dL)   Date Value   10/26/2022 151 (H)       (goal LDL is <100)   AST (U/L)   Date Value   10/26/2022 23     ALT (U/L)   Date Value   10/26/2022 28     BUN (mg/dL)   Date Value   10/26/2022 14     BP Readings from Last 3 Encounters:   11/16/22 (!) 157/89   10/26/22 (!) 151/75   08/24/22 115/71          (goal 120/80)    All Future Testing planned in [facility]  Lab Frequency Next Occurrence   CBC Once 06/11/2022   ECHO Complete 2D W Doppler W Color Once 03/10/2022   T. pallidum Ab Once 08/24/2022   Hepatitis Panel, Acute Once 08/24/2022   PAP Smear Once 10/26/2022   CT Lung Screening Once 11/16/2022         Patient Active Problem List:     Class 3 severe obesity due to excess calories with serious comorbidity and body mass index (BMI) of 40.0 to 44.9 in adult [facility])     Hyperlipidemia     Anxiety     Mucoid cyst of joint     Post-traumatic osteoarthritis of left hand     Essential hypertension     Fibromyalgia     Type 2 diabetes mellitus without complication, without long-term current use of insulin (HCC)     Vitamin D deficiency     CHARLES (obstructive sleep apnea)     Abnormal mammogram of left breast. (8/2020)

## 2023-04-14 PROBLEM — E11.21 DIABETIC NEPHROPATHY ASSOCIATED WITH TYPE 2 DIABETES MELLITUS (HCC): Status: ACTIVE | Noted: 2020-09-29

## 2023-04-14 PROBLEM — R10.819 ABDOMINAL TENDERNESS: Status: ACTIVE | Noted: 2020-09-30

## 2023-04-14 PROBLEM — R06.2 SYMPTOM OF WHEEZING: Status: ACTIVE | Noted: 2020-09-30

## 2023-04-14 PROBLEM — F32.A DEPRESSIVE DISORDER: Status: ACTIVE | Noted: 2020-09-29

## 2023-04-14 PROBLEM — J44.9 CHRONIC OBSTRUCTIVE PULMONARY DISEASE (HCC): Status: ACTIVE | Noted: 2020-09-29

## 2023-04-17 ENCOUNTER — OFFICE VISIT (OUTPATIENT)
Dept: OBGYN | Age: 58
End: 2023-04-17
Payer: MEDICAID

## 2023-04-17 ENCOUNTER — HOSPITAL ENCOUNTER (OUTPATIENT)
Age: 58
Setting detail: SPECIMEN
Discharge: HOME | End: 2023-04-17

## 2023-04-17 VITALS
SYSTOLIC BLOOD PRESSURE: 148 MMHG | WEIGHT: 234 LBS | HEART RATE: 72 BPM | DIASTOLIC BLOOD PRESSURE: 91 MMHG | BODY MASS INDEX: 41.45 KG/M2

## 2023-04-17 DIAGNOSIS — N89.8 VAGINAL IRRITATION: ICD-10-CM

## 2023-04-17 DIAGNOSIS — N89.8 VAGINAL IRRITATION: Primary | ICD-10-CM

## 2023-04-17 DIAGNOSIS — R30.0 DYSURIA: ICD-10-CM

## 2023-04-17 LAB
BACTERIA: ABNORMAL
BILIRUBIN URINE: NEGATIVE
CASTS UA: ABNORMAL /LPF (ref 0–8)
COLOR: YELLOW
EPITHELIAL CELLS UA: ABNORMAL /HPF (ref 0–5)
GLUCOSE UR STRIP.AUTO-MCNC: ABNORMAL MG/DL
KETONES UR STRIP.AUTO-MCNC: NEGATIVE MG/DL
LEUKOCYTE ESTERASE UR QL STRIP.AUTO: ABNORMAL
NITRITE UR QL STRIP.AUTO: POSITIVE
PROT UR STRIP.AUTO-MCNC: 5.5 MG/DL (ref 5–8)
PROT UR STRIP.AUTO-MCNC: ABNORMAL MG/DL
RBC CLUMPS #/AREA URNS AUTO: ABNORMAL /HPF (ref 0–4)
SPECIFIC GRAVITY UA: 1.04 (ref 1–1.03)
TURBIDITY: ABNORMAL
URINE HGB: ABNORMAL
UROBILINOGEN, URINE: NORMAL
WBC UA: ABNORMAL /HPF (ref 0–5)

## 2023-04-17 PROCEDURE — 99211 OFF/OP EST MAY X REQ PHY/QHP: CPT

## 2023-04-17 PROCEDURE — 3080F DIAST BP >= 90 MM HG: CPT

## 2023-04-17 PROCEDURE — 3077F SYST BP >= 140 MM HG: CPT

## 2023-04-17 PROCEDURE — 99213 OFFICE O/P EST LOW 20 MIN: CPT

## 2023-04-17 RX ORDER — CLOBETASOL PROPIONATE 0.5 MG/G
CREAM TOPICAL
Qty: 1 EACH | Refills: 1 | Status: SHIPPED | OUTPATIENT
Start: 2023-04-17

## 2023-04-17 NOTE — PROGRESS NOTES
Attending Physician Statement  I have discussed the care of Jamin Granda, including pertinent history and exam findings, with the resident. I have seen and examined the patient and the key elements of all parts of the encounter have been performed by me. I agree with the assessment, plan and orders as documented by the resident.   [facility] Modifier)    Ariana Bhatia DO  [address] [place] [address]  4/17/2023, 4:42 PM
clitoris and posterior fourchette. Patient also had bilateral groin irritation and redness. Well estrogenized vaginal mucosa and urethral meatus. Low suspicion that symptoms are due to vaginal atropy 2/2 estrogen withdrawal due to menopause   - Gc/C and Vaginitis ordered. Will follow results   - Strongly suspect dysuria 2/2 acidic urine making contact with broken down and irritated skin, however, Ucx ordered to r/o UTI as etiology   - Last HbA1c was 8.4% on 11/2022. Patient states that she has not been taking her metformin for her DM. Discussed that uncontrolled diabetes can lead to abnormal skin findings and poor wound healing. She states that she has an appt with PCP this week to discuss better glycemic control   - Patient given Rx for clobetasol topical cream to use BID x1 month with one refill. She was instructed to buy water wipes OTC as toilet paper may be too abrasive to her skin at this time. She was thoroughly instructed on its use   - Additionally, given bilateral groin irritation and redness, she was advised to use Aquaphor as an emollient and skin protective barrier in those areas to prevent further irritation to the skin    - All questions answered and patient expressed understanding. She is to return in 2 months to assess for resolution of symptoms.  If symptoms unresolved after that time, patient will need vulvar biopsy  Patient Active Problem List    Diagnosis Date Noted    Hyperlipidemia 12/13/2011     Priority: High    Class 3 severe obesity due to excess calories with serious comorbidity and body mass index (BMI) of 40.0 to 44.9 in adult ([facility] [address] 12/13/2011     Priority: Medium    Vitamin D deficiency 02/19/2021    CHARLES (obstructive sleep apnea) 02/19/2021    Abnormal mammogram of left breast. (8/2020) 02/19/2021    Symptom of wheezing 09/30/2020    Abdominal tenderness 09/30/2020    Diabetic nephropathy associated with type 2 diabetes mellitus ([facility] [address] 09/29/2020    Depressive disorder 09/29/2020

## 2023-04-18 LAB
C TRACH DNA SPEC QL PROBE+SIG AMP: NEGATIVE
CANDIDA SPECIES, DNA PROBE: NEGATIVE
GARDNERELLA VAGINALIS, DNA PROBE: NEGATIVE
N GONORRHOEA DNA SPEC QL PROBE+SIG AMP: NEGATIVE
SOURCE: NORMAL
SPECIMEN DESCRIPTION: NORMAL
TRICHOMONAS VAGINALIS DNA: NEGATIVE

## 2023-04-19 DIAGNOSIS — N30.00 ACUTE CYSTITIS WITHOUT HEMATURIA: Primary | ICD-10-CM

## 2023-04-19 LAB
MICROORGANISM SPEC CULT: ABNORMAL
SPECIMEN DESCRIPTION: ABNORMAL

## 2023-04-19 RX ORDER — NITROFURANTOIN 25; 75 MG/1; MG/1
100 CAPSULE ORAL 2 TIMES DAILY
Qty: 10 CAPSULE | Refills: 0 | Status: SHIPPED | OUTPATIENT
Start: 2023-04-19 | End: 2023-04-24

## 2023-05-03 ENCOUNTER — OFFICE VISIT (OUTPATIENT)
Dept: INTERNAL MEDICINE | Age: 58
End: 2023-05-03
Payer: MEDICAID

## 2023-05-03 ENCOUNTER — HOSPITAL ENCOUNTER (OUTPATIENT)
Age: 58
Setting detail: SPECIMEN
Discharge: HOME | End: 2023-05-03

## 2023-05-03 VITALS
DIASTOLIC BLOOD PRESSURE: 80 MMHG | BODY MASS INDEX: 41.64 KG/M2 | HEART RATE: 67 BPM | WEIGHT: 235 LBS | OXYGEN SATURATION: 98 % | HEIGHT: 63 IN | TEMPERATURE: 97.9 F | SYSTOLIC BLOOD PRESSURE: 150 MMHG

## 2023-05-03 DIAGNOSIS — E78.2 MIXED HYPERLIPIDEMIA: ICD-10-CM

## 2023-05-03 DIAGNOSIS — E11.21 DIABETIC NEPHROPATHY ASSOCIATED WITH TYPE 2 DIABETES MELLITUS (HCC): ICD-10-CM

## 2023-05-03 DIAGNOSIS — E11.65 TYPE 2 DIABETES MELLITUS WITH HYPERGLYCEMIA, WITHOUT LONG-TERM CURRENT USE OF INSULIN (HCC): Primary | ICD-10-CM

## 2023-05-03 DIAGNOSIS — E11.65 TYPE 2 DIABETES MELLITUS WITH HYPERGLYCEMIA, WITHOUT LONG-TERM CURRENT USE OF INSULIN (HCC): ICD-10-CM

## 2023-05-03 LAB
EST. AVERAGE GLUCOSE BLD GHB EST-MCNC: 332 MG/DL
HBA1C MFR BLD: 13.2 % (ref 4–6)

## 2023-05-03 PROCEDURE — 3077F SYST BP >= 140 MM HG: CPT | Performed by: STUDENT IN AN ORGANIZED HEALTH CARE EDUCATION/TRAINING PROGRAM

## 2023-05-03 PROCEDURE — 82962 GLUCOSE BLOOD TEST: CPT | Performed by: STUDENT IN AN ORGANIZED HEALTH CARE EDUCATION/TRAINING PROGRAM

## 2023-05-03 PROCEDURE — 3079F DIAST BP 80-89 MM HG: CPT | Performed by: STUDENT IN AN ORGANIZED HEALTH CARE EDUCATION/TRAINING PROGRAM

## 2023-05-03 PROCEDURE — 99214 OFFICE O/P EST MOD 30 MIN: CPT | Performed by: STUDENT IN AN ORGANIZED HEALTH CARE EDUCATION/TRAINING PROGRAM

## 2023-05-03 RX ORDER — EMPAGLIFLOZIN 10 MG/1
10 TABLET, FILM COATED ORAL DAILY
Qty: 30 TABLET | Refills: 1 | Status: SHIPPED | OUTPATIENT
Start: 2023-05-03

## 2023-05-03 RX ORDER — ORAL SEMAGLUTIDE 3 MG/1
3 TABLET ORAL DAILY
Qty: 30 TABLET | Refills: 1 | Status: SHIPPED | OUTPATIENT
Start: 2023-05-03

## 2023-05-03 RX ORDER — ATORVASTATIN CALCIUM 20 MG/1
20 TABLET, FILM COATED ORAL DAILY
Qty: 30 TABLET | Refills: 5 | Status: SHIPPED | OUTPATIENT
Start: 2023-05-03

## 2023-05-03 SDOH — ECONOMIC STABILITY: FOOD INSECURITY: WITHIN THE PAST 12 MONTHS, THE FOOD YOU BOUGHT JUST DIDN'T LAST AND YOU DIDN'T HAVE MONEY TO GET MORE.: NEVER TRUE

## 2023-05-03 SDOH — ECONOMIC STABILITY: FOOD INSECURITY: WITHIN THE PAST 12 MONTHS, YOU WORRIED THAT YOUR FOOD WOULD RUN OUT BEFORE YOU GOT MONEY TO BUY MORE.: NEVER TRUE

## 2023-05-03 SDOH — ECONOMIC STABILITY: INCOME INSECURITY: HOW HARD IS IT FOR YOU TO PAY FOR THE VERY BASICS LIKE FOOD, HOUSING, MEDICAL CARE, AND HEATING?: NOT HARD AT ALL

## 2023-05-03 SDOH — ECONOMIC STABILITY: HOUSING INSECURITY
IN THE LAST 12 MONTHS, WAS THERE A TIME WHEN YOU DID NOT HAVE A STEADY PLACE TO SLEEP OR SLEPT IN A SHELTER (INCLUDING NOW)?: NO

## 2023-05-03 ASSESSMENT — PATIENT HEALTH QUESTIONNAIRE - PHQ9
SUM OF ALL RESPONSES TO PHQ QUESTIONS 1-9: 0
8. MOVING OR SPEAKING SO SLOWLY THAT OTHER PEOPLE COULD HAVE NOTICED. OR THE OPPOSITE, BEING SO FIGETY OR RESTLESS THAT YOU HAVE BEEN MOVING AROUND A LOT MORE THAN USUAL: 0
7. TROUBLE CONCENTRATING ON THINGS, SUCH AS READING THE NEWSPAPER OR WATCHING TELEVISION: 0
10. IF YOU CHECKED OFF ANY PROBLEMS, HOW DIFFICULT HAVE THESE PROBLEMS MADE IT FOR YOU TO DO YOUR WORK, TAKE CARE OF THINGS AT HOME, OR GET ALONG WITH OTHER PEOPLE: 0
4. FEELING TIRED OR HAVING LITTLE ENERGY: 0
9. THOUGHTS THAT YOU WOULD BE BETTER OFF DEAD, OR OF HURTING YOURSELF: 0
5. POOR APPETITE OR OVEREATING: 0
2. FEELING DOWN, DEPRESSED OR HOPELESS: 0
SUM OF ALL RESPONSES TO PHQ QUESTIONS 1-9: 0
6. FEELING BAD ABOUT YOURSELF - OR THAT YOU ARE A FAILURE OR HAVE LET YOURSELF OR YOUR FAMILY DOWN: 0
SUM OF ALL RESPONSES TO PHQ9 QUESTIONS 1 & 2: 0
1. LITTLE INTEREST OR PLEASURE IN DOING THINGS: 0
3. TROUBLE FALLING OR STAYING ASLEEP: 0

## 2023-05-03 NOTE — PATIENT INSTRUCTIONS
Medications e-scribe to pharmacy of pt's choice. Labs given to patient, they will have them done before their next visit. Return To Clinic 6/6/23. After Visit Summary  given and reviewed. --    It is very important for your care that you keep your appointment. If for some reason you are unable to keep your appointment it is equally important that you call our office at [phone] to cancel your appointment and reschedule. Failure to do so may result in your termination from our practice.      Ilana Alvarado

## 2023-05-03 NOTE — PROGRESS NOTES
[facility]    Progress Note    Date of patient's visit: 5/3/2023    Patient's Name:  Mackenzie Najera    YOB: 1965            Patient Care Team:  Elmo Gaucher, MD as PCP - General (Internal Medicine)  Elmo Gaucher, MD as PCP - Empaneled Provider  Blair Lucas MD as Referring Physician (Internal Medicine)    REASON FOR VISIT: Routine outpatient follow     Chief Complaint   Patient presents with    Blood Sugar Problem     POCT glucose 393, pt says she ate 2 hours ago and is wanting freestyle leana device to help check sugars regularly. HISTORY OF PRESENT ILLNESS:    History was obtained from the patient. Mackenzie Najera is a 56 y.o. is here for same day visit for DM. Last visit with me in Dec 2022 to establish care. Pt has cancelled or no showed for several visits since. She recently saw a NP in  with [facility] to establish care but is not in network there. Presented to  today wanting to be seen for her diabetes despite not having an appt or contacting our office. Stated her sugars have been in 400s and was adamantly demanding care by the front staff. She thought she had an appt with me but did not. Previously under Dr. Hao Restrepo but was dismissed from her practice. Overall per prior physician documentation pt often refuses medications. Also dismissed by Dr. Elida Moore endocrinology for non compliance. She has poorly controlled diabetes c/b peripheral neuropathy. At last visit POC A1c was 8.4 and which time she was taking metformin 1000 mg BID but was refusing her previously prescribed glimepiride. She declined other therapies especially injectable medications such as insulin, GLP-1 agonist. She was previously taking an \"immune boosting formula\" that a \"homeopath in [place]\"  was providing her for her DM but could no longer afford it.  Today she reports she has not been taking her metformin since Jan because she felt like it made her

## 2023-05-08 ENCOUNTER — TELEPHONE (OUTPATIENT)
Dept: INTERNAL MEDICINE | Age: 58
End: 2023-05-08

## 2023-05-08 NOTE — TELEPHONE ENCOUNTER
Prior Authorization for [facility] reader and sensor. Waiting for response for insurance company.

## 2023-05-26 DIAGNOSIS — R05.3 CHRONIC COUGH: ICD-10-CM

## 2023-05-26 DIAGNOSIS — F17.200 SMOKER: ICD-10-CM

## 2023-05-30 RX ORDER — ALBUTEROL SULFATE 90 UG/1
AEROSOL, METERED RESPIRATORY (INHALATION)
Qty: 9 G | Refills: 5 | Status: SHIPPED | OUTPATIENT
Start: 2023-05-30

## 2023-05-30 NOTE — TELEPHONE ENCOUNTER
Last visit: 5/3/23  Last Med refill:   Does patient have enough medication for 72 hours: No:     Next Visit Date:  Future Appointments   Date Time Provider Nathalie Barraza   6/6/2023 11:20 AM Damian Woodard [facility]   6/22/2023  3:30 PM Lalito Zaldivar DO [facility] OB/Gyn [facility]   10/26/2023  1:00 PM Quinn Johnston MD [facility] OB/Gyn [address] Maintenance   Topic Date Due    COVID-19 Vaccine (1) Never done    Low dose CT lung screening  Never done    Diabetic retinal exam  04/13/2022    Diabetic foot exam  06/17/2022    Hepatitis B vaccine (1 of 3 - Risk 3-dose series) 12/02/2023 (Originally 6/28/1986)    Shingles vaccine (1 of 2) 12/02/2023 (Originally 6/28/2017)    Flu vaccine (Season Ended) 08/01/2023    A1C test (Diabetic or Prediabetic)  08/03/2023    Breast cancer screen  08/17/2023    Colorectal Cancer Screen  08/19/2023    Diabetic Alb to Cr ratio (uACR) test  10/26/2023    Lipids  10/26/2023    GFR test (Diabetes, CKD 3-4, OR last GFR 15-59)  10/26/2023    Depression Monitoring  05/03/2024    Cervical cancer screen  10/26/2027    DTaP/Tdap/Td vaccine (2 - Td or Tdap) 11/30/2027    Pneumococcal 0-64 years Vaccine  Completed    Hepatitis C screen  Completed    HIV screen  Completed    Hepatitis A vaccine  Aged Out    Hib vaccine  Aged Out    Meningococcal (ACWY) vaccine  Aged Out       Hemoglobin A1C (%)   Date Value   05/03/2023 13.2 (H)   11/16/2022 8.4   03/10/2022 9.8             ( goal A1C is < 7)   Microalb/Crt.  Ratio (mcg/mg creat)   Date Value   10/26/2022 29 (H)     LDL Cholesterol (mg/dL)   Date Value   10/26/2022 151 (H)   12/02/2020            (goal LDL is <100)   AST (U/L)   Date Value   10/26/2022 23     ALT (U/L)   Date Value   10/26/2022 28     BUN (mg/dL)   Date Value   10/26/2022 14     BP Readings from Last 3 Encounters:   05/03/23 (!) 150/80   04/17/23 (!) 148/91   11/16/22 (!) 157/89          (goal 120/80)    All Future Testing planned in [facility]  Lab Frequency Next

## 2023-06-01 NOTE — PROGRESS NOTES
[facility]    Progress Note    Date of patient's visit: 6/18/2019    Patient's Name:  Cari García    YOB: 1965            Patient Care Team:  Sugar Hammonds MD as PCP - General (Internal Medicine)  Sugar Hammonds MD as PCP - [facility] Empaneled Provider  Sunni Valera MD as Referring Physician (Internal Medicine)    REASON FOR VISIT: Routine outpatient follow     Chief Complaint   Patient presents with    Weight Loss     Pt would like to discuss starting adipex. she would like to try to pill before doing bariatric surgery     Health Maintenance     pt will make eye appt, she is aware that she is due     Injections     pt would like to get B12 injection, states that the tabs are not covered by insurance          HISTORY OF PRESENT ILLNESS:    History was obtained from the patient. Cari García is a 48 y.o. is here for follow-up. She would like to start phentermine for weight loss. She is not interested in going to bariatric clinic. She does not exercise. She says she has food cravings all the time. She is being treated for low vitamin D by her rheumatologist.  She has a history of B12 deficiency. Last B12 level was better but she has macrocytosis. She agrees to have B12 injection as she says she cannot get oral meds paid by insurance.            Past Medical History:   Diagnosis Date    Anxiety 2/23/2017    Chronic back pain     [facility]ch[facility] pulmonary disease[facility]     Fibromyalgia 3/12/2019    GERD (gastroesophageal reflux disease)     resolved not taking medds    HSV-2 (herpes simplex virus 2) infection 7/2/2013    Hyperlipidemia     Hypertension     Neuropathy     feet    Obesity     Osteoarthritis     Type II or unspecified type diabetes mellitus without mention of complication, not stated as uncontrolled     Uncontrolled type 2 diabetes mellitus without complication, without long-term current use of insulin ([address] 5/22/2019    Urinary incontinence        Past Surgical History:   Procedure Laterality Date    TONSILLECTOMY AND ADENOIDECTOMY      TUBAL LIGATION           ALLERGIES      Allergies   Allergen Reactions    Latex Hives    Lisinopril      Cough      Vicodin [Hydrocodone-Acetaminophen] Hives and Itching    Zocor [Simvastatin]        MEDICATIONS:      Current Outpatient Medications on File Prior to Visit   Medication Sig Dispense Refill    amLODIPine (NORVASC) 10 MG tablet TAKE 1 TABLET BY MOUTH ONCE DAILY 90 tablet 1    metFORMIN (GLUCOPHAGE) 1000 MG tablet TAKE 1 TABLET BY MOUTH TWICE DAILY WITH FOOD 180 tablet 3    fluticasone (FLONASE) 50 MCG/ACT nasal spray 1 spray by Each Nostril route daily 2 Bottle 1    pregabalin (LYRICA) 150 MG capsule Take 150 mg by mouth. Take 1 capsule by mouth BID      atorvastatin (LIPITOR) 20 MG tablet Take 1 tablet by mouth daily 30 tablet 6    hydrochlorothiazide (HYDRODIURIL) 25 MG tablet TAKE ONE TABLET BY MOUTH ONCE DAILY 90 tablet 1    amitriptyline (ELAVIL) 10 MG tablet Q 8 PM      tiZANidine (ZANAFLEX) 2 MG tablet       blood glucose monitor strips Test 1  times daily 100 strip 3    Lancets MISC Test 2/day 100 each 11    oxaprozin (DAYPRO) 600 MG tablet TAKE 1 TABLET BY MOUTH ONCE DAILY 30 tablet 1    glimepiride (AMARYL) 2 MG tablet TAKE 1 TABLET BY MOUTH EVERY MORNING 30 tablet 3     Current Facility-Administered Medications on File Prior to Visit   Medication Dose Route Frequency Provider Last Rate Last Dose    cyanocobalamin injection 1,000 mcg  1,000 mcg Intramuscular Once Patsy Carson MD           SOCIAL HISTORY    Reviewed and no change from previous record. Levi Skinner  reports that she quit smoking about 2 years ago. Her smoking use included cigarettes. She has a 20.00 pack-year smoking history.  She has never used smokeless tobacco.    FAMILY HISTORY:    Reviewed and No change from previous visit    HEALTH MAINTENANCE DUE:      Health Maintenance Due   Topic Date Due    Diabetic retinal exam  04/14/2017    Breast cancer screen  04/14/2017    Colon Cancer Screen FIT/FOBT  07/14/2018    Diabetic foot exam  10/05/2018       REVIEW OF SYSTEMS:    12 point review of symptoms completed and found to be normal except noted in the HPI    Review of Systems   Constitutional: Positive for fatigue. Negative for chills and fever. HENT: Positive for congestion. Negative for ear pain, postnasal drip, rhinorrhea and sinus pain.    Respiratory: Positive for cough and shortness of breath. Negative for wheezing.    Cardiovascular: Negative for chest pain, palpitations and leg swelling. Gastrointestinal: Negative for abdominal pain, blood in stool, constipation and nausea. Genitourinary: Negative for dysuria, frequency, menstrual problem and urgency. Musculoskeletal: Positive for arthralgias and myalgias. Negative for back pain and joint swelling. Neurological: Positive for numbness. Negative for dizziness, weakness and headaches. Hematological: Negative for adenopathy. Does not bruise/bleed easily. Psychiatric/Behavioral: Negative for dysphoric mood and sleep disturbance. The patient is nervous/anxious.            PHYSICAL EXAM:     Vitals:    06/18/19 1102   BP: 113/77   Site: Right Upper Arm   Position: Sitting   Cuff Size: Large Adult   Pulse: 96   Weight: 234 lb (106.1 kg)     Body mass index is 41.45 kg/m². BP Readings from Last 3 Encounters:   06/18/19 113/77   05/22/19 129/81   03/29/19 123/78        Wt Readings from Last 3 Encounters:   06/18/19 234 lb (106.1 kg)   05/22/19 228 lb (103.4 kg)   03/29/19 223 lb (101.2 kg)       Physical Exam         HENT:  Normocephalic, Atraumatic, Neck- Normal range of motion, No tenderness, Supple. Eyes:  PERRL, EOMI, Conjunctiva normal, No discharge. Respiratory:  Normal breath sounds, No respiratory distress, No wheezing, No chest tenderness.    Cardiovascular:  Normal heart rate, Normal rhythm  GI:  Bowel sounds normal, Soft, No tenderness  Musculoskeletal:  Intact distal pulses, No edema, mild tenderness right MCP joint thumb. No wrist tenderness. No synovitis. Mucoid cyst right little finger DIP joint. tenderness, Back- No tenderness. Integument:  Warm, Dry, No erythema, No rash. Lymphatic:  No lymphadenopathy noted. Neurologic:  Alert & oriented x 3, Normal motor function, Normal sensory function, No focal deficits noted. Psychiatric:  Affect normal         LABORATORY FINDINGS:    CBC:  Lab Results   Component Value Date    WBC 10.5 04/01/2019    HGB 13.1 04/01/2019     04/01/2019     BMP:    Lab Results   Component Value Date     09/25/2018    K 4.4 09/25/2018     09/25/2018    CO2 27 09/25/2018    BUN 15 09/25/2018    CREATININE 0.69 09/25/2018    GLUCOSE 151 09/25/2018    GLUCOSE 201 02/17/2012     HEMOGLOBIN A1C:   Lab Results   Component Value Date    LABA1C 7.6 03/12/2019     MICROALBUMIN URINE:   Lab Results   Component Value Date    MICROALBUR 30 09/25/2018     FASTING LIPID [email or web address]  Lab Results   Component Value Date    LDLCHOLESTEROL 171 (H) 09/25/2018    LDLDIRECT 128 (H) 09/27/2017       LIVER PROFILE:  Lab Results   Component Value Date    ALT 31 09/25/2018    AST 20 09/25/2018    PROT 7.6 09/25/2018    BILITOT 0.40 09/25/2018    BILIDIR 0.09 02/17/2012    LABALBU 4.2 09/25/2018    LABALBU 4.3 02/17/2012      THYROID FUNCTION:   Lab Results   Component Value Date    TSH 1.78 11/20/2012      URINEANALYSIS: No results found for: LABURIN  ASSESSMENT AND PLAN:    1. Essential hypertension  Same meds    2. Uncontrolled type 2 diabetes mellitus without complication, without long-term current use of insulin ([address]    - Brynn Sánchez MD, Endocrinology, [facility]    3. Low vitamin D level    - Vitamin D 25 Hydroxy; Future    4. Low vitamin B12 level    - cyanocobalamin injection 1,000 mcg    5.  Morbid obesity with BMI of 40.0-44.9, adult [facility])    - Elli Marques MD, Endocrinology, [facility] UP AND INSTRUCTIONS:   Return in about 6 months (around 12/18/2019). 1. Nacho Lee received counseling on the following healthy behaviors: nutrition, exercise and medication adherence    2. Reviewed prior labs and health maintenance. 3. Discussed use, benefit, and side effects of prescribed medications. Barriers to medication compliance addressed. All patient questions answered. Pt voiced understanding.      4. Patient given educational materials - see patient instructions    Arron Miranda  Attending Physician, [address] [place] [address], Internal Medicine Residency Program  [address] [place] [address]  6/18/2019, 11:22 AM Detail Level: Detailed Size Of Lesion In Cm (Optional): 0 Introduction Text (Please End With A Colon): The following procedure was deferred: Procedure To Be Performed At Next Visit: Skin Tag removal

## 2023-06-06 ENCOUNTER — OFFICE VISIT (OUTPATIENT)
Dept: INTERNAL MEDICINE | Age: 58
End: 2023-06-06
Payer: MEDICAID

## 2023-06-06 VITALS
OXYGEN SATURATION: 97 % | TEMPERATURE: 97.4 F | WEIGHT: 231 LBS | HEIGHT: 63 IN | DIASTOLIC BLOOD PRESSURE: 95 MMHG | BODY MASS INDEX: 40.93 KG/M2 | SYSTOLIC BLOOD PRESSURE: 120 MMHG | HEART RATE: 93 BPM

## 2023-06-06 DIAGNOSIS — E66.01 CLASS 3 SEVERE OBESITY DUE TO EXCESS CALORIES WITH SERIOUS COMORBIDITY AND BODY MASS INDEX (BMI) OF 40.0 TO 44.9 IN ADULT (HCC): ICD-10-CM

## 2023-06-06 DIAGNOSIS — J30.2 SEASONAL ALLERGIES: ICD-10-CM

## 2023-06-06 DIAGNOSIS — B35.1 ONYCHOMYCOSIS: ICD-10-CM

## 2023-06-06 DIAGNOSIS — E11.65 TYPE 2 DIABETES MELLITUS WITH HYPERGLYCEMIA, WITHOUT LONG-TERM CURRENT USE OF INSULIN (HCC): Primary | ICD-10-CM

## 2023-06-06 LAB
CHP ED QC CHECK: NORMAL
GLUCOSE BLD-MCNC: 331 MG/DL

## 2023-06-06 PROCEDURE — 3074F SYST BP LT 130 MM HG: CPT | Performed by: STUDENT IN AN ORGANIZED HEALTH CARE EDUCATION/TRAINING PROGRAM

## 2023-06-06 PROCEDURE — 3078F DIAST BP <80 MM HG: CPT | Performed by: STUDENT IN AN ORGANIZED HEALTH CARE EDUCATION/TRAINING PROGRAM

## 2023-06-06 PROCEDURE — 3046F HEMOGLOBIN A1C LEVEL >9.0%: CPT | Performed by: STUDENT IN AN ORGANIZED HEALTH CARE EDUCATION/TRAINING PROGRAM

## 2023-06-06 PROCEDURE — 99214 OFFICE O/P EST MOD 30 MIN: CPT | Performed by: STUDENT IN AN ORGANIZED HEALTH CARE EDUCATION/TRAINING PROGRAM

## 2023-06-06 RX ORDER — ERGOCALCIFEROL (VITAMIN D2) 10 MCG
1 TABLET ORAL DAILY
Qty: 90 TABLET | Refills: 1 | Status: SHIPPED | OUTPATIENT
Start: 2023-06-06

## 2023-06-06 RX ORDER — TERBINAFINE HCL 250 MG
250 TABLET ORAL DAILY
Qty: 84 TABLET | Refills: 0 | Status: SHIPPED | OUTPATIENT
Start: 2023-06-06

## 2023-06-06 RX ORDER — LORATADINE 10 MG/1
10 TABLET ORAL DAILY
Qty: 30 TABLET | Refills: 5 | Status: SHIPPED | OUTPATIENT
Start: 2023-06-06

## 2023-06-06 RX ORDER — EMPAGLIFLOZIN 25 MG/1
25 TABLET, FILM COATED ORAL DAILY
Qty: 90 TABLET | Refills: 1 | Status: SHIPPED | OUTPATIENT
Start: 2023-06-06

## 2023-06-06 RX ORDER — UBIDECARENONE 75 MG
100 CAPSULE ORAL DAILY
Qty: 90 TABLET | Refills: 1 | Status: SHIPPED | OUTPATIENT
Start: 2023-06-06 | End: 2023-12-03

## 2023-06-06 RX ORDER — MAGNESIUM OXIDE 400 MG/1
400 TABLET ORAL DAILY
Qty: 90 TABLET | Refills: 1 | Status: SHIPPED | OUTPATIENT
Start: 2023-06-06

## 2023-06-06 NOTE — PROGRESS NOTES
CC: DM2 f/u    HPI: ***  Hx: HTN, CHARLES, COPD, DM2, HLD    Meds:   Refills needed? ***      A1c: 13.2 (8.4) (9.8). B    P/C ratio elevated, elevated microalbuminuria 46 (30)  High cholesterol, LDL, triglycerides  Low HDL    HMD:   Diabetic retinal and foot exam due  Low dose CT lung screen    Cards: Echo never done? CPAP for CHARLES? ?
10/26/2022 06:11 PM     FASTING LIPID PANEL:  Lab Results   Component Value Date    CHOL 249 (H) 10/26/2022    HDL 32 (L) 10/26/2022    TRIG 330 (H) 10/26/2022     Lab Results   Component Value Date    LDLCHOLESTEROL 151 (H) 10/26/2022    LDLDIRECT 84 12/02/2020       LIVER PROFILE:  Lab Results   Component Value Date/Time    ALT 28 10/26/2022 04:30 PM    AST 23 10/26/2022 04:30 PM    PROT 7.4 10/26/2022 04:30 PM    BILITOT 0.3 10/26/2022 04:30 PM    BILIDIR 0.09 02/17/2012 11:03 AM    LABALBU 4.2 10/26/2022 04:30 PM    LABALBU 4.3 02/17/2012 11:03 AM      THYROID FUNCTION:   Lab Results   Component Value Date/Time    TSH 2.46 12/02/2020 10:49 AM      URINEANALYSIS: No results found for: LABURIN  ASSESSMENT AND PLAN:      #Type 2 diabetes mellitus with hyperglycemia, without long-term current use of insulin ([address]  Poorly controlled due to long h/o noncompliance. Last A1c was 13.2 in May 2023. Has h/o refusing medications, especially injectable despite education on risk of ongoing hyperglycemia. Pt d/c'f her metformin due to her reported side effects of hunger. Recently started on Jardiance & Rybelsus but pt only took Jardiance due to concerns of it lowering her blood sugar too much. I reassured the patient that that would not happen and that she should take medication as prescribed. Her point-of-care glucose today was over 300, however this was 1 hour after eating bread. Discussed with patient plan to increase her Jardiance to 25 and start on the Rybelsus as previously discussed. Due to lack of home BG monitoring it is difficult to assess pt response to treatment otherwise and too early for repeat A1c.   - Encouraged pt to start Rybelsus 3 mg by mouth daily  - Increase Jardiance 10 -> 25 MG by mouth daily   - RN visit for Enzo Rae education scheduled  - On statin, continue  - Declined ARB.  Previously developed cough with ACE-I.   - Foot exam completed today  - KERRY completed in Oct 2022.   - Encouraged completion of

## 2023-06-22 ENCOUNTER — TELEPHONE (OUTPATIENT)
Dept: OBGYN | Age: 58
End: 2023-06-22

## 2023-08-01 ENCOUNTER — TELEPHONE (OUTPATIENT)
Dept: OTHER | Age: 58
End: 2023-08-01

## 2023-08-01 NOTE — TELEPHONE ENCOUNTER
8.1.23 at 12:20 pm patient call received by answering service requesting to cancel appointment that is scheduled today at 1 pm with Pascual Tatum due to family illness. Patient indicates she will call to reschedule.   MICHAEL BELTRAN.

## 2023-08-21 DIAGNOSIS — N89.8 VAGINAL IRRITATION: ICD-10-CM

## 2023-08-21 RX ORDER — CLOBETASOL PROPIONATE 0.5 MG/G
CREAM TOPICAL
Qty: 15 G | Refills: 0 | OUTPATIENT
Start: 2023-08-21

## 2023-09-27 DIAGNOSIS — E11.65 TYPE 2 DIABETES MELLITUS WITH HYPERGLYCEMIA, WITHOUT LONG-TERM CURRENT USE OF INSULIN (HCC): ICD-10-CM

## 2023-09-28 ENCOUNTER — TELEPHONE (OUTPATIENT)
Dept: INTERNAL MEDICINE | Age: 58
End: 2023-09-28

## 2023-09-28 RX ORDER — ORAL SEMAGLUTIDE 3 MG/1
1 TABLET ORAL DAILY
Qty: 30 TABLET | Refills: 0 | Status: SHIPPED | OUTPATIENT
Start: 2023-09-28

## 2023-09-28 NOTE — TELEPHONE ENCOUNTER
Pharmacy requesting refills for Rybelsus. Please review and e-scribe to pharmacy listed in chart if appropriate. Thank you.       Next Visit Date: LVM to schedule f/u  Last Visit Date: 6/6/23    Future Appointments   Date Time Provider [address]   10/3/2023  3:15 PM Jovita Baumann DO [facility] OB/Gyn [id number]   10/26/2023  1:00 PM Jeison Coe MD [facility] OB/Gyn [address] Maintenance   Topic Date Due    COVID-19 Vaccine (1) Never done    Low dose CT lung screening &/or counseling  Never done    Pneumococcal 0-64 years Vaccine (2 - PCV) 07/14/2018    Diabetic retinal exam  04/13/2022    Flu vaccine (1) Never done    A1C test (Diabetic or Prediabetic)  08/03/2023    Breast cancer screen  08/17/2023    Colorectal Cancer Screen  08/19/2023    Diabetic Alb to Cr ratio (uACR) test  10/26/2023    Lipids  10/26/2023    GFR test (Diabetes, CKD 3-4, OR last GFR 15-59)  10/26/2023    Hepatitis B vaccine (1 of 3 - 3-dose series) 12/02/2023 (Originally 1965)    Shingles vaccine (1 of 2) 12/02/2023 (Originally 6/28/2017)    Depression Monitoring  05/03/2024    Diabetic foot exam  06/07/2024    Cervical cancer screen  10/26/2027    DTaP/Tdap/Td vaccine (2 - Td or Tdap) 11/30/2027    Hepatitis C screen  Completed    HIV screen  Completed    Hepatitis A vaccine  Aged Out    Hib vaccine  Aged Out    Meningococcal (ACWY) vaccine  Aged Out       Hemoglobin A1C (%)   Date Value   05/03/2023 13.2 (H)   11/16/2022 8.4   03/10/2022 9.8             ( goal A1C is < 7)   No components found for: \"LABMICR\"  LDL Cholesterol (mg/dL)   Date Value   10/26/2022 151 (H)       (goal LDL is <100)   AST (U/L)   Date Value   10/26/2022 23     ALT (U/L)   Date Value   10/26/2022 28     BUN (mg/dL)   Date Value   10/26/2022 14     BP Readings from Last 3 Encounters:   06/06/23 (!) 120/95   05/03/23 (!) 150/80   04/17/23 (!) 148/91          (goal 120/80)    All Future Testing planned in [facility]  Lab Frequency Next Occurrence   PAP Smear Once

## 2023-10-02 RX ORDER — IBUPROFEN 800 MG/1
TABLET ORAL
COMMUNITY
Start: 2023-07-03

## 2023-10-03 ENCOUNTER — OFFICE VISIT (OUTPATIENT)
Dept: OBGYN | Age: 58
End: 2023-10-03
Payer: MEDICAID

## 2023-10-03 ENCOUNTER — HOSPITAL ENCOUNTER (OUTPATIENT)
Age: 58
Setting detail: SPECIMEN
Discharge: HOME | End: 2023-10-03

## 2023-10-03 ENCOUNTER — TELEPHONE (OUTPATIENT)
Dept: OBGYN | Age: 58
End: 2023-10-03

## 2023-10-03 VITALS
BODY MASS INDEX: 40.21 KG/M2 | HEART RATE: 74 BPM | DIASTOLIC BLOOD PRESSURE: 85 MMHG | SYSTOLIC BLOOD PRESSURE: 143 MMHG | WEIGHT: 227 LBS

## 2023-10-03 DIAGNOSIS — B37.2 YEAST DERMATITIS: ICD-10-CM

## 2023-10-03 DIAGNOSIS — Z11.3 ROUTINE SCREENING FOR STI (SEXUALLY TRANSMITTED INFECTION): ICD-10-CM

## 2023-10-03 DIAGNOSIS — E11.65 POORLY CONTROLLED TYPE 2 DIABETES MELLITUS (HCC): ICD-10-CM

## 2023-10-03 DIAGNOSIS — Z12.31 SCREENING MAMMOGRAM FOR BREAST CANCER: Primary | ICD-10-CM

## 2023-10-03 PROCEDURE — 3046F HEMOGLOBIN A1C LEVEL >9.0%: CPT | Performed by: STUDENT IN AN ORGANIZED HEALTH CARE EDUCATION/TRAINING PROGRAM

## 2023-10-03 PROCEDURE — 3079F DIAST BP 80-89 MM HG: CPT | Performed by: STUDENT IN AN ORGANIZED HEALTH CARE EDUCATION/TRAINING PROGRAM

## 2023-10-03 PROCEDURE — 3077F SYST BP >= 140 MM HG: CPT | Performed by: OBSTETRICS & GYNECOLOGY

## 2023-10-03 PROCEDURE — 99213 OFFICE O/P EST LOW 20 MIN: CPT | Performed by: OBSTETRICS & GYNECOLOGY

## 2023-10-03 RX ORDER — NYSTATIN 100000 U/G
CREAM TOPICAL DAILY
Qty: 30 G | Refills: 1 | Status: SHIPPED | OUTPATIENT
Start: 2023-10-03 | End: 2023-10-13

## 2023-10-03 NOTE — PROGRESS NOTES
OB/GYN Problem Visit    Armaan Smith  10/3/2023                       Primary Care Physician: Brenda Hart MD    CC: No chief complaint on file. HPI: Armaan Smith is a 66 y.o. female [id number]    The patient was seen and examined. She is here for *** and is complaining of ***. Her Patient's last menstrual period was 06/10/2013. and she {denies/complains:14319} irregular/heavy bleeding and dysmenorrhea. Her periods are {Desc; regular/irre} and last *** days. She describes them as {LIGHT/MODERATE/HEAVY:277154659}. Her bowel habits are {Desc; regular/irre}. She {denies/complains:95084} any bloating. She {denies/complains:61523} dysuria. She {denies/complains:88515} urinary leaking. She {denies/complains:76108} vaginal discharge. She {IS/IS WYN:10829} sexually active with {Sexual partners:5163}. She uses {contraception:227407173} for contraception and {IS/IS OMU:26620} desiring pregnancy.     REVIEW OF SYSTEMS:***  Constitutional: negative fever, negative chills  HEENT: negative visual disturbances, negative headaches  Respiratory: negative dyspnea, negative cough  Cardiovascular: negative chest pain,  negative palpitations  Gastrointestinal: negative abdominal pain, negative RUQ pain, negative N/V, negative diarrhea, negative constipation  Genitourinary: negative dysuria, negative vaginal discharge  Dermatological: negative rash  Hematologic: negative bruising  Immunologic/Lymphatic: negative recent illness, negative recent sick contact  Musculoskeletal: negative back pain, negative myalgias, negative arthralgias  Neurological:  negative dizziness, negative weakness  Behavior/Psych: negative depression, negative anxiety  ________________________________________________________________________    GYNECOLOGICAL HISTORY:  Age of Menarche: {AGE OF MENARCHE:049076178}  Age of Menopause: ***     Sexually Active: {Sexual partners:5163}  STD History: {Ob std:39064}    Pap History: {PAP
Screening mammogram for breast cancer  [facility] NEMO DIGITAL SCREEN BILATERAL      2. Poorly controlled type 2 diabetes mellitus (HCC)  Hemoglobin A1C      3. Yeast dermatitis        4.  Routine screening for STI (sexually transmitted infection)  Vaginitis DNA Probe    Chlamydia Trachomatis & Neisseria gonorrhoeae (GC) by amplified detection          Matt Garcia MD  Ob/Gyn   [facility], [facility] - JIHAN MERC  10/3/2023, 4:15 PM

## 2023-10-03 NOTE — TELEPHONE ENCOUNTER
Writer called PT. No answer and Pt phone is not accepting message. Pt need a Procedure (IND) appt with an Attending, Dr Melvin Johnson. Dr. Melvin Johnson or Dr. Venkatesh Resendiz do not have a schedule up at this time. Pt refuse to see a Resident. If this do not work for the Pt, I tried calling to let  the Pt know, Dr. Deja Hoffmann office @[phone], might be the best course of action for her. Dr. Villasenor Seeds aware. Thank You.

## 2023-10-04 ENCOUNTER — TELEPHONE (OUTPATIENT)
Dept: OBGYN | Age: 58
End: 2023-10-04

## 2023-10-04 LAB
CANDIDA SPECIES: NEGATIVE
EST. AVERAGE GLUCOSE BLD GHB EST-MCNC: 232 MG/DL
GARDNERELLA VAGINALIS: POSITIVE
HBA1C MFR BLD: 9.7 % (ref 4–6)
SOURCE: ABNORMAL
TRICHOMONAS: NEGATIVE

## 2023-10-04 NOTE — TELEPHONE ENCOUNTER
Marcus Armando called this morning asking for clarification of nystatin cream directions ordered yesterday.

## 2023-10-04 NOTE — TELEPHONE ENCOUNTER
Directions for nystatin cream called to the pharmacist at [facility].

## 2023-10-05 LAB
C TRACH DNA SPEC QL PROBE+SIG AMP: NEGATIVE
N GONORRHOEA DNA SPEC QL PROBE+SIG AMP: NEGATIVE
SPECIMEN DESCRIPTION: NORMAL

## 2023-10-20 ENCOUNTER — TELEPHONE (OUTPATIENT)
Dept: OBGYN | Age: 58
End: 2023-10-20

## 2023-10-20 NOTE — TELEPHONE ENCOUNTER
Writer called PT. No answer, Pt phone is not accepting messages. Pt have to reschedule her annual from 10/26/2023, due to Provider will not be in the office. Thank You.

## 2023-10-23 ENCOUNTER — TELEPHONE (OUTPATIENT)
Dept: OBGYN | Age: 58
End: 2023-10-23

## 2023-10-23 NOTE — TELEPHONE ENCOUNTER
Mail box is full and I was unable to leave a message . We can go ahead and order a diagnostic mammogram and possible left breast US for patient reported left breast lump

## 2023-11-03 DIAGNOSIS — F17.200 SMOKER: ICD-10-CM

## 2023-11-03 DIAGNOSIS — R05.3 CHRONIC COUGH: ICD-10-CM

## 2023-11-03 DIAGNOSIS — E11.65 TYPE 2 DIABETES MELLITUS WITH HYPERGLYCEMIA, WITHOUT LONG-TERM CURRENT USE OF INSULIN (HCC): ICD-10-CM

## 2023-11-03 RX ORDER — ORAL SEMAGLUTIDE 3 MG/1
1 TABLET ORAL DAILY
Qty: 30 TABLET | Refills: 2 | Status: SHIPPED | OUTPATIENT
Start: 2023-11-03

## 2023-11-03 RX ORDER — ALBUTEROL SULFATE 90 UG/1
AEROSOL, METERED RESPIRATORY (INHALATION)
Qty: 9 G | Refills: 2 | Status: SHIPPED | OUTPATIENT
Start: 2023-11-03

## 2023-11-03 NOTE — TELEPHONE ENCOUNTER
Function Panel Once 07/06/2023   [facility] NEMO DIGITAL SCREEN BILATERAL Once 10/03/2023               Patient Active Problem List:     Onychomycosis     Class 3 severe obesity due to excess calories with serious comorbidity and body mass index (BMI) of 40.0 to 44.9 in adult [facility])     Hyperlipidemia     Anxiety     Mucoid cyst of joint     Post-traumatic osteoarthritis of left hand     Essential hypertension     Fibromyalgia     Type 2 diabetes mellitus with hyperglycemia, without long-term current use of insulin (HCC)     Vitamin D deficiency     CHARLES (obstructive sleep apnea)     Abnormal mammogram of left breast. (8/2020)     Diabetic nephropathy associated with type 2 diabetes mellitus (HCC)     Symptom of wheezing     Depressive disorder     Chronic obstructive pulmonary disease (HCC)     Abdominal tenderness

## 2023-11-22 ENCOUNTER — TELEPHONE (OUTPATIENT)
Dept: INTERNAL MEDICINE | Age: 58
End: 2023-11-22

## 2023-11-22 NOTE — TELEPHONE ENCOUNTER
----- Message from Pascual Ewing sent at 11/21/2023 10:15 AM EST -----  Subject: Appointment Request    Reason for Call: Established Patient Appointment needed: Routine Existing   Condition Follow Up    QUESTIONS    Reason for appointment request? No appointments available during search     Additional Information for Provider? pt wanted to see if she could   rescheudeld her ana for 12/04/23 in the after noon as she has another ana   that day and wanted to do them both on the same day. please call with   upcoming or cancellations 12/04/23 try to call twice or text No Voice mail     ---------------------------------------------------------------------------  --------------  [address]  [phone];  Do not leave any message, patient will call back for answer  ---------------------------------------------------------------------------  --------------  SCRIPT ANSWERS

## 2023-11-22 NOTE — TELEPHONE ENCOUNTER
Called the patient to get her scheduled, per her request, but was unable to reach her. Left voicemail asking her to return the call to the office.

## 2023-12-07 DIAGNOSIS — E11.65 TYPE 2 DIABETES MELLITUS WITH HYPERGLYCEMIA, WITHOUT LONG-TERM CURRENT USE OF INSULIN (HCC): ICD-10-CM

## 2023-12-08 RX ORDER — EMPAGLIFLOZIN 25 MG/1
25 TABLET, FILM COATED ORAL DAILY
Qty: 30 TABLET | Refills: 1 | Status: SHIPPED | OUTPATIENT
Start: 2023-12-08

## 2023-12-08 NOTE — TELEPHONE ENCOUNTER
..Request for   Requested Prescriptions     Pending Prescriptions Disp Refills    JARDIANCE 25 MG tablet [Pharmacy Med Name: Jardiance 25 MG Oral Tablet] 90 tablet 0     Sig: Take 1 tablet by mouth once daily    .      Please review and e-scribe to pharmacy listed in chart if appropriate. Thank you.      Last Visit Date: 6/6/2023  Next Visit Date: Visit date not found    No future appointments.    Health Maintenance   Topic Date Due    Hepatitis B vaccine (1 of 3 - 3-dose series) Never done    COVID-19 Vaccine (1) Never done    Shingles vaccine (1 of 2) Never done    Low dose CT lung screening &/or counseling  Never done    Pneumococcal 0-64 years Vaccine (2 - PCV) 07/14/2018    Diabetic retinal exam  04/13/2022    Flu vaccine (1) Never done    Breast cancer screen  08/17/2023    Colorectal Cancer Screen  08/19/2023    Diabetic Alb to Cr ratio (uACR) test  10/26/2023    Lipids  10/26/2023    GFR test (Diabetes, CKD 3-4, OR last GFR 15-59)  10/26/2023    A1C test (Diabetic or Prediabetic)  01/03/2024    Depression Monitoring  05/03/2024    Diabetic foot exam  06/07/2024    Cervical cancer screen  10/26/2027    DTaP/Tdap/Td vaccine (2 - Td or Tdap) 11/30/2027    Hepatitis C screen  Completed    HIV screen  Completed    Hepatitis A vaccine  Aged Out    Hib vaccine  Aged Out    Meningococcal (ACWY) vaccine  Aged Out       Hemoglobin A1C (%)   Date Value   10/03/2023 9.7 (H)   05/03/2023 13.2 (H)   11/16/2022 8.4             ( goal A1C is < 7)   No components found for: \"LABMICR\"  LDL Cholesterol (mg/dL)   Date Value   10/26/2022 151 (H)       (goal LDL is <100)   AST (U/L)   Date Value   10/26/2022 23     ALT (U/L)   Date Value   10/26/2022 28     BUN (mg/dL)   Date Value   10/26/2022 14     BP Readings from Last 3 Encounters:   10/03/23 (!) 143/85   06/06/23 (!) 120/95   05/03/23 (!) 150/80          (goal 120/80)    All Future Testing planned in [facility]  Lab Frequency Next Occurrence   Hepatic Function Panel Once

## 2023-12-08 NOTE — TELEPHONE ENCOUNTER
Patient needs to make an appointment with new provider. She has not been seen in a while and needs follow up for more refills. Thanks!

## 2024-01-11 ENCOUNTER — TELEPHONE (OUTPATIENT)
Dept: INTERNAL MEDICINE | Age: 59
End: 2024-01-11

## 2024-01-11 NOTE — TELEPHONE ENCOUNTER
Pt cancelled appt, mailing list of alternate providers that are not residency clinics, see letters tab.

## 2024-01-11 NOTE — TELEPHONE ENCOUNTER
PC to pt to discuss request for antibiotics and to advise that she needs to be seen for antibiotic request.    Pt very difficult to work with, states she hates coming here, hates the residents, hates that her doctors keep leaving. Writer let pt know that previous PCP leaving was unexpected. Pt very frustrated, writer listened and offered empathy. Plan is for pt to come in this afternoon to be evaluated for antibiotic need, writer will provide pt a list of alternate offices in the area that are not residency programs that may be able to offer pt better scheduling options in the future.

## 2024-01-18 ENCOUNTER — TELEPHONE (OUTPATIENT)
Dept: INTERNAL MEDICINE | Age: 59
End: 2024-01-18

## 2024-01-18 DIAGNOSIS — R05.3 CHRONIC COUGH: ICD-10-CM

## 2024-01-18 DIAGNOSIS — F17.200 SMOKER: ICD-10-CM

## 2024-01-18 RX ORDER — ALBUTEROL SULFATE 90 UG/1
AEROSOL, METERED RESPIRATORY (INHALATION)
Qty: 18 G | Refills: 0 | Status: SHIPPED | OUTPATIENT
Start: 2024-01-18

## 2024-01-18 NOTE — TELEPHONE ENCOUNTER
..Request for   Requested Prescriptions     Pending Prescriptions Disp Refills    albuterol sulfate HFA (PROVENTIL;VENTOLIN;PROAIR) 108 (90 Base) MCG/ACT inhaler [Pharmacy Med Name: Albuterol Sulfate  (90 Base) MCG/ACT Inhalation Aerosol Solution] 18 g 0     Sig: INHALE 2 PUFFS BY MOUTH 4 TIMES DAILY AS NEEDED FOR WHEEZING    .      Please review and e-scribe to pharmacy listed in chart if appropriate. Thank you.      Last Visit Date: 6/6/2023  Next Visit Date: 1/18/2024    No future appointments.    Health Maintenance   Topic Date Due    Hepatitis B vaccine (1 of 3 - 3-dose series) Never done    COVID-19 Vaccine (1) Never done    Shingles vaccine (1 of 2) Never done    Low dose CT lung screening &/or counseling  Never done    Pneumococcal 0-64 years Vaccine (2 - PCV) 07/14/2018    Diabetic retinal exam  04/13/2022    Flu vaccine (1) Never done    Breast cancer screen  08/17/2023    Colorectal Cancer Screen  08/19/2023    Diabetic Alb to Cr ratio (uACR) test  10/26/2023    Lipids  10/26/2023    GFR test (Diabetes, CKD 3-4, OR last GFR 15-59)  10/26/2023    A1C test (Diabetic or Prediabetic)  01/03/2024    Depression Monitoring  05/03/2024    Diabetic foot exam  06/07/2024    Cervical cancer screen  10/26/2027    DTaP/Tdap/Td vaccine (2 - Td or Tdap) 11/30/2027    Hepatitis C screen  Completed    HIV screen  Completed    Hepatitis A vaccine  Aged Out    Hib vaccine  Aged Out    Polio vaccine  Aged Out    Meningococcal (ACWY) vaccine  Aged Out       Hemoglobin A1C (%)   Date Value   10/03/2023 9.7 (H)   05/03/2023 13.2 (H)   11/16/2022 8.4             ( goal A1C is < 7)   No components found for: \"LABMICR\"  LDL Cholesterol (mg/dL)   Date Value   10/26/2022 151 (H)       (goal LDL is <100)   AST (U/L)   Date Value   10/26/2022 23     ALT (U/L)   Date Value   10/26/2022 28     BUN (mg/dL)   Date Value   10/26/2022 14     BP Readings from Last 3 Encounters:   10/03/23 (!) 143/85   06/06/23 (!) 120/95   05/03/23

## 2024-01-18 NOTE — TELEPHONE ENCOUNTER
Patient needs a NTP appointment with any available provider. Tx from [facility].

## 2024-02-13 DIAGNOSIS — F17.200 SMOKER: ICD-10-CM

## 2024-02-13 DIAGNOSIS — E11.65 TYPE 2 DIABETES MELLITUS WITH HYPERGLYCEMIA, WITHOUT LONG-TERM CURRENT USE OF INSULIN (HCC): ICD-10-CM

## 2024-02-13 DIAGNOSIS — R05.3 CHRONIC COUGH: ICD-10-CM

## 2024-02-13 RX ORDER — ALBUTEROL SULFATE 90 UG/1
AEROSOL, METERED RESPIRATORY (INHALATION)
Qty: 18 G | Refills: 0 | Status: SHIPPED | OUTPATIENT
Start: 2024-02-13

## 2024-02-13 RX ORDER — EMPAGLIFLOZIN 25 MG/1
25 TABLET, FILM COATED ORAL DAILY
Qty: 30 TABLET | Refills: 1 | Status: SHIPPED | OUTPATIENT
Start: 2024-02-13

## 2024-02-13 NOTE — TELEPHONE ENCOUNTER
Escribe refill request from [facility] for Albuterol Inhaler.    Patient calling requesting a refill on Jardiance 25 mg.  Patient states that she has no medication left.      Last visit: 6/6/23  Last Med refill: 12/8/23  Does patient have enough medication for 72 hours: No:     Next Visit Date: 5/23/24  Future Appointments   Date Time Provider Department Center   5/23/2024 11:20 AM Ysabel Pollard MD [facility]       Health Maintenance   Topic Date Due    Hepatitis B vaccine (1 of 3 - 3-dose series) Never done    COVID-19 Vaccine (1) Never done    Shingles vaccine (1 of 2) Never done    Low dose CT lung screening &/or counseling  Never done    Pneumococcal 0-64 years Vaccine (2 - PCV) 07/14/2018    Diabetic retinal exam  04/13/2022    Flu vaccine (1) Never done    Breast cancer screen  08/17/2023    Colorectal Cancer Screen  08/19/2023    Diabetic Alb to Cr ratio (uACR) test  10/26/2023    Lipids  10/26/2023    GFR test (Diabetes, CKD 3-4, OR last GFR 15-59)  10/26/2023    A1C test (Diabetic or Prediabetic)  01/03/2024    Depression Monitoring  05/03/2024    Diabetic foot exam  06/07/2024    Cervical cancer screen  10/26/2027    DTaP/Tdap/Td vaccine (2 - Td or Tdap) 11/30/2027    Hepatitis C screen  Completed    HIV screen  Completed    Hepatitis A vaccine  Aged Out    Hib vaccine  Aged Out    Polio vaccine  Aged Out    Meningococcal (ACWY) vaccine  Aged Out       Hemoglobin A1C (%)   Date Value   10/03/2023 9.7 (H)   05/03/2023 13.2 (H)   11/16/2022 8.4             ( goal A1C is < 7)   No components found for: \"LABMICR\"  LDL Cholesterol (mg/dL)   Date Value   10/26/2022 151 (H)   12/02/2020            (goal LDL is <100)   AST (U/L)   Date Value   10/26/2022 23     ALT (U/L)   Date Value   10/26/2022 28     BUN (mg/dL)   Date Value   10/26/2022 14     BP Readings from Last 3 Encounters:   10/03/23 (!) 143/85   06/06/23 (!) 120/95   05/03/23 (!) 150/80          (goal 120/80)    All Future Testing planned in

## 2024-03-08 ENCOUNTER — TELEPHONE (OUTPATIENT)
Dept: INTERNAL MEDICINE | Age: 59
End: 2024-03-08

## 2024-03-08 DIAGNOSIS — E11.65 TYPE 2 DIABETES MELLITUS WITH HYPERGLYCEMIA, WITHOUT LONG-TERM CURRENT USE OF INSULIN (HCC): ICD-10-CM

## 2024-03-08 RX ORDER — ORAL SEMAGLUTIDE 3 MG/1
1 TABLET ORAL DAILY
Qty: 30 TABLET | Refills: 0 | Status: SHIPPED | OUTPATIENT
Start: 2024-03-08

## 2024-03-08 RX ORDER — EMPAGLIFLOZIN 25 MG/1
25 TABLET, FILM COATED ORAL DAILY
Qty: 30 TABLET | Refills: 1 | Status: CANCELLED | OUTPATIENT
Start: 2024-03-08

## 2024-03-08 NOTE — TELEPHONE ENCOUNTER
Called to get the patient scheduled sooner but was unable to. The patient had an attitude and was going off about how our office are the ones that scheduled her for May. She would not let me talk so I asked her to call the office back when she is able to talk and get scheduled.     Need a NTP appointment with a resident, per Dr. Pollard.

## 2024-03-08 NOTE — TELEPHONE ENCOUNTER
This message is from [facility]. I also had a bad experience with her this morning trying to get her rescheduled with a resident sooner than May, per Dr. Pollard. There is an additional note from another encounter before this. Please advise and try to get patient rescheduled if possible.

## 2024-03-08 NOTE — TELEPHONE ENCOUNTER
----- Message from Radha Sales sent at 3/8/2024 10:20 AM EST -----  Subject: Message to Provider    QUESTIONS  Information for Provider? pt would like a later appt. She was very rude to   me over the phone because the soonest she could get in was may and it was   morning. She was not happy with that and was complaining that she is out   of medications. Said the pharmacy has been sending it over all week   despite nothing showing in the system that they have done so. If anything   opens up in the afternoon she would like that visit instead but if it is   not afternoon \"dont be calling her and waking her up for nothing early\"   per the pt.   ---------------------------------------------------------------------------  --------------  CALL BACK INFO  [phone]; Do not leave any message, patient will call back for answer  ---------------------------------------------------------------------------  --------------  SCRIPT ANSWERS  Relationship to Patient? Self

## 2024-03-08 NOTE — TELEPHONE ENCOUNTER
Please schedule her appointment earlier. She is not seen in office since June  I refilled her meds for 1 month but he needs to be seen in next month  If I am not available, she can establish care with any resident

## 2024-03-08 NOTE — TELEPHONE ENCOUNTER
----- Message from Alena De sent at 3/7/2024 10:37 AM EST -----  Subject: Medication Problem     Medication: JARDIANCE 25 MG tablet  Dosage: 25 mg.  Ordering Provider:     Question/Problem: PT stated pharmacy send faxes over and need response for   refill.      Pharmacy: [address]), [place] - [address] [facility] [phone] -  [phone]     Medication: RYBELSUS 3 MG TABS  Dosage: unknown  Ordering Provider:     Question/Problem: PT stated pharmacy send faxes over and need response for   refill.      Pharmacy: [address]), [place] - [address] [facility] [phone] - f [phone]    ---------------------------------------------------------------------------  --------------  CALL BACK INFO  [phone]; Do not leave any message, patient will call back for answer  ---------------------------------------------------------------------------  --------------    SCRIPT ANSWERS  Relationship to Patient: Self

## 2024-03-23 DIAGNOSIS — F17.200 SMOKER: ICD-10-CM

## 2024-03-23 DIAGNOSIS — R05.3 CHRONIC COUGH: ICD-10-CM

## 2024-03-25 RX ORDER — ALBUTEROL SULFATE 90 UG/1
AEROSOL, METERED RESPIRATORY (INHALATION)
Qty: 18 G | Refills: 0 | Status: SHIPPED | OUTPATIENT
Start: 2024-03-25

## 2024-03-25 NOTE — TELEPHONE ENCOUNTER
A Refill Has Been Requested for Juliette Thomas    Medication Requested  Requested Prescriptions     Pending Prescriptions Disp Refills    albuterol sulfate HFA (PROVENTIL;VENTOLIN;PROAIR) 108 (90 Base) MCG/ACT inhaler [Pharmacy Med Name: Albuterol Sulfate  (90 Base) MCG/ACT Inhalation Aerosol Solution] 18 g 0     Sig: INHALE 2 PUFFS BY MOUTH 4 TIMES DAILY AS NEEDED FOR WHEEZING       Last Visit Date (If Applicable)  Visit date not found    Next Visit Date (If Applicable)  5/23/2024

## 2024-04-08 DIAGNOSIS — E11.65 TYPE 2 DIABETES MELLITUS WITH HYPERGLYCEMIA, WITHOUT LONG-TERM CURRENT USE OF INSULIN (HCC): ICD-10-CM

## 2024-04-08 NOTE — TELEPHONE ENCOUNTER
A Refill Has Been Requested for Juliette Thomas    Medication Requested  Requested Prescriptions     Pending Prescriptions Disp Refills    JARDIANCE 25 MG tablet [Pharmacy Med Name: Jardiance 25 MG Oral Tablet] 30 tablet 0     Sig: Take 1 tablet by mouth once daily    RYBELSUS 3 MG TABS [Pharmacy Med Name: Rybelsus 3 MG Oral Tablet] 30 tablet 0     Sig: Take 1 tablet by mouth once daily       Last Visit Date (If Applicable)  Visit date not found    Next Visit Date (If Applicable)  5/23/2024

## 2024-04-09 RX ORDER — ORAL SEMAGLUTIDE 3 MG/1
1 TABLET ORAL DAILY
Qty: 30 TABLET | Refills: 0 | Status: SHIPPED | OUTPATIENT
Start: 2024-04-09

## 2024-04-09 RX ORDER — EMPAGLIFLOZIN 25 MG/1
25 TABLET, FILM COATED ORAL DAILY
Qty: 30 TABLET | Refills: 0 | Status: SHIPPED | OUTPATIENT
Start: 2024-04-09

## 2024-04-16 DIAGNOSIS — E78.2 MIXED HYPERLIPIDEMIA: ICD-10-CM

## 2024-04-16 NOTE — TELEPHONE ENCOUNTER
Patient calling requesting a refill on Atorvastatin 20 mg.      Last visit: 6/6/23  Last Med refill: 5/3/23  Does patient have enough medication for 72 hours: No:     Next Visit Date: 5/23/24  Future Appointments   Date Time Provider Department Center   5/23/2024 11:20 AM Ysabel Pollard MD [id number]       Health Maintenance   Topic Date Due    Hepatitis B vaccine (1 of 3 - 3-dose series) Never done    COVID-19 Vaccine (1) Never done    Shingles vaccine (1 of 2) Never done    Low dose CT lung screening &/or counseling  Never done    Pneumococcal 0-64 years Vaccine (2 of 2 - PCV) 07/14/2018    Diabetic retinal exam  04/13/2022    Breast cancer screen  08/17/2023    Colorectal Cancer Screen  08/19/2023    Diabetic Alb to Cr ratio (uACR) test  10/26/2023    Lipids  10/26/2023    GFR test (Diabetes, CKD 3-4, OR last GFR 15-59)  10/26/2023    Annual Wellness Visit (Medicare Advantage)  Never done    A1C test (Diabetic or Prediabetic)  01/03/2024    Depression Monitoring  05/03/2024    Diabetic foot exam  06/07/2024    Flu vaccine (Season Ended) 08/01/2024    Cervical cancer screen  10/26/2027    DTaP/Tdap/Td vaccine (2 - Td or Tdap) 11/30/2027    Hepatitis C screen  Completed    HIV screen  Completed    Hepatitis A vaccine  Aged Out    Hib vaccine  Aged Out    Polio vaccine  Aged Out    Meningococcal (ACWY) vaccine  Aged Out       Hemoglobin A1C (%)   Date Value   10/03/2023 9.7 (H)   05/03/2023 13.2 (H)   11/16/2022 8.4             ( goal A1C is < 7)   No components found for: \"LABMICR\"  LDL Cholesterol (mg/dL)   Date Value   10/26/2022 151 (H)   12/02/2020            (goal LDL is <100)   AST (U/L)   Date Value   10/26/2022 23     ALT (U/L)   Date Value   10/26/2022 28     BUN (mg/dL)   Date Value   10/26/2022 14     BP Readings from Last 3 Encounters:   10/03/23 (!) 143/85   06/06/23 (!) 120/95   05/03/23 (!) 150/80          (goal 120/80)    All Future Testing planned in [facility]  Lab Frequency Next Occurrence

## 2024-04-17 RX ORDER — ATORVASTATIN CALCIUM 20 MG/1
20 TABLET, FILM COATED ORAL DAILY
Qty: 30 TABLET | Refills: 5 | Status: SHIPPED | OUTPATIENT
Start: 2024-04-17

## 2024-04-17 NOTE — TELEPHONE ENCOUNTER
Pt called again today for this medication and states she has been out of this med for two weeks. Dr Alex you are listed as covering physician for Dr Pollard. Please consider signing for pt

## 2024-05-09 ENCOUNTER — TELEPHONE (OUTPATIENT)
Dept: INTERNAL MEDICINE | Age: 59
End: 2024-05-09

## 2024-05-09 DIAGNOSIS — E11.65 TYPE 2 DIABETES MELLITUS WITH HYPERGLYCEMIA, WITHOUT LONG-TERM CURRENT USE OF INSULIN (HCC): ICD-10-CM

## 2024-05-09 RX ORDER — ORAL SEMAGLUTIDE 3 MG/1
1 TABLET ORAL DAILY
Qty: 30 TABLET | Refills: 0 | Status: SHIPPED | OUTPATIENT
Start: 2024-05-09

## 2024-05-09 RX ORDER — EMPAGLIFLOZIN 25 MG/1
25 TABLET, FILM COATED ORAL DAILY
Qty: 30 TABLET | Refills: 0 | Status: SHIPPED | OUTPATIENT
Start: 2024-05-09

## 2024-05-09 NOTE — TELEPHONE ENCOUNTER
..Request for   Requested Prescriptions     Pending Prescriptions Disp Refills    RYBELSUS 3 MG TABS 30 tablet 0     Sig: Take 1 tablet by mouth daily    JARDIANCE 25 MG tablet 30 tablet 0     Sig: Take 1 tablet by mouth daily    .      Please review and e-scribe to pharmacy listed in chart if appropriate. Thank you.      Last Visit Date: 6/6/2023  Next Visit Date: 5/23/2024    Future Appointments   Date Time Provider Department Center   5/23/2024 11:20 AM Ysabel Pollard MD [facility]       Health Maintenance   Topic Date Due    Hepatitis B vaccine (1 of 3 - 3-dose series) Never done    COVID-19 Vaccine (1) Never done    Shingles vaccine (1 of 2) Never done    Low dose CT lung screening &/or counseling  Never done    Pneumococcal 0-64 years Vaccine (2 of 2 - PCV) 07/14/2018    Diabetic retinal exam  04/13/2022    Breast cancer screen  08/17/2023    Colorectal Cancer Screen  08/19/2023    Diabetic Alb to Cr ratio (uACR) test  10/26/2023    Lipids  10/26/2023    GFR test (Diabetes, CKD 3-4, OR last GFR 15-59)  10/26/2023    Annual Wellness Visit (Medicare Advantage)  Never done    A1C test (Diabetic or Prediabetic)  01/03/2024    Depression Monitoring  05/03/2024    Diabetic foot exam  06/07/2024    Flu vaccine (Season Ended) 08/01/2024    Cervical cancer screen  10/26/2027    DTaP/Tdap/Td vaccine (2 - Td or Tdap) 11/30/2027    Hepatitis C screen  Completed    HIV screen  Completed    Hepatitis A vaccine  Aged Out    Hib vaccine  Aged Out    Polio vaccine  Aged Out    Meningococcal (ACWY) vaccine  Aged Out       Hemoglobin A1C (%)   Date Value   10/03/2023 9.7 (H)   05/03/2023 13.2 (H)   11/16/2022 8.4             ( goal A1C is < 7)   No components found for: \"LABMICR\"  No components found for: \"LDLCHOLESTEROL\", \"LDLCALC\"    (goal LDL is <100)   AST (U/L)   Date Value   10/26/2022 23     ALT (U/L)   Date Value   10/26/2022 28     BUN (mg/dL)   Date Value   10/26/2022 14     BP Readings from Last 3 Encounters:

## 2024-05-09 NOTE — TELEPHONE ENCOUNTER
Received request for PA on [email or web address] for Rybelsus. Writer completed PA and sent to insurance, but [email or web address] is not loading properly. PA now indicates it was canceled by provider which is inaccurate.    Writer renewed PA request and submitted to insurance.    Pt's insurance listed as [employer], but [facility] card is scanned in as of 10/2023. Per [employer]'s formulary, medication does not need PA.    PC to pharmacy, spoke with Laure re: pt's insurance coverage. She initially states she sees [facility] card listed, which we do not. Laure attempted to run medication under [employer] and received response that coverage ended 4/30/24. Laure tried again and received response under [employer] that medication is not covered and requires a PA. Per Laure, pt's insurance appears to be commercial in nature. Provided writer with BIN and PCN.    Attempted to contact pt, no answer. Unable to LVM mailbox not set up or is full.    Writer decided to complete PA under [employer] Medicare form as it is incredibly unlikely pt is suddenly employed. Successfully submitted PA to insurance.

## 2024-05-10 NOTE — TELEPHONE ENCOUNTER
PA for Rybelsus denied as there are several formulary medications pt has not tried and failed. It also appears pt's insurance is through Marketplace. Writer unable to locate proper formulary list as writer unsure what insurance pt has.     PC to pt again to try to determine insurance information. Pt states she only had [employer] for one month b/c something was \"messed up\" with her Medicaid. Pt states she has [facility] again, pt got her card and writer confirmed it is the same information as the last [facility] card we have on file.    PC to pharmacy, confirmed [facility] is active, medications processed with no issue. Pharmacy rep states pt will get notification when medications are ready.    Addendum 1548: Received Epic notification that both medications were denied, appears they are trying to run through [facility] Medicaid, not Medicare. PC to pharmacy again to confirm no issues with medications.

## 2024-05-16 DIAGNOSIS — R05.3 CHRONIC COUGH: ICD-10-CM

## 2024-05-16 DIAGNOSIS — F17.200 SMOKER: ICD-10-CM

## 2024-05-16 NOTE — TELEPHONE ENCOUNTER
Juliette Thomas is calling to request a refill on the following medication(s):    Medication Request:  Requested Prescriptions     Pending Prescriptions Disp Refills    albuterol sulfate HFA (PROVENTIL;VENTOLIN;PROAIR) 108 (90 Base) MCG/ACT inhaler [Pharmacy Med Name: Albuterol Sulfate  (90 Base) MCG/ACT Inhalation Aerosol Solution] 18 g 0     Sig: INHALE 2 PUFFS BY MOUTH 4 TIMES DAILY AS NEEDED FOR WHEEZING       Last Visit Date (If Applicable):  6/6/2023    Next Visit Date:    5/23/2024

## 2024-05-20 RX ORDER — ALBUTEROL SULFATE 90 UG/1
AEROSOL, METERED RESPIRATORY (INHALATION)
Qty: 18 G | Refills: 0 | Status: SHIPPED | OUTPATIENT
Start: 2024-05-20

## 2024-05-23 ENCOUNTER — OFFICE VISIT (OUTPATIENT)
Dept: INTERNAL MEDICINE | Age: 59
End: 2024-05-23
Payer: MEDICARE

## 2024-05-23 VITALS
HEIGHT: 63 IN | WEIGHT: 224 LBS | HEART RATE: 73 BPM | OXYGEN SATURATION: 96 % | TEMPERATURE: 98 F | BODY MASS INDEX: 39.69 KG/M2 | DIASTOLIC BLOOD PRESSURE: 68 MMHG | SYSTOLIC BLOOD PRESSURE: 106 MMHG

## 2024-05-23 DIAGNOSIS — E11.21 DIABETIC NEPHROPATHY ASSOCIATED WITH TYPE 2 DIABETES MELLITUS (HCC): ICD-10-CM

## 2024-05-23 DIAGNOSIS — Z12.11 COLON CANCER SCREENING: ICD-10-CM

## 2024-05-23 DIAGNOSIS — E83.42 HYPOMAGNESEMIA: ICD-10-CM

## 2024-05-23 DIAGNOSIS — E66.01 CLASS 3 SEVERE OBESITY DUE TO EXCESS CALORIES WITH SERIOUS COMORBIDITY AND BODY MASS INDEX (BMI) OF 40.0 TO 44.9 IN ADULT (HCC): ICD-10-CM

## 2024-05-23 DIAGNOSIS — E55.9 VITAMIN D DEFICIENCY: ICD-10-CM

## 2024-05-23 DIAGNOSIS — Z87.891 PERSONAL HISTORY OF TOBACCO USE: ICD-10-CM

## 2024-05-23 DIAGNOSIS — F17.200 SMOKER: ICD-10-CM

## 2024-05-23 DIAGNOSIS — Z12.31 ENCOUNTER FOR SCREENING MAMMOGRAM FOR MALIGNANT NEOPLASM OF BREAST: ICD-10-CM

## 2024-05-23 DIAGNOSIS — E11.65 TYPE 2 DIABETES MELLITUS WITH HYPERGLYCEMIA, WITHOUT LONG-TERM CURRENT USE OF INSULIN (HCC): Primary | ICD-10-CM

## 2024-05-23 LAB — HBA1C MFR BLD: 8.9 %

## 2024-05-23 PROCEDURE — 99213 OFFICE O/P EST LOW 20 MIN: CPT | Performed by: STUDENT IN AN ORGANIZED HEALTH CARE EDUCATION/TRAINING PROGRAM

## 2024-05-23 PROCEDURE — 83036 HEMOGLOBIN GLYCOSYLATED A1C: CPT | Performed by: STUDENT IN AN ORGANIZED HEALTH CARE EDUCATION/TRAINING PROGRAM

## 2024-05-23 PROCEDURE — 2022F DILAT RTA XM EVC RTNOPTHY: CPT | Performed by: STUDENT IN AN ORGANIZED HEALTH CARE EDUCATION/TRAINING PROGRAM

## 2024-05-23 PROCEDURE — 3074F SYST BP LT 130 MM HG: CPT | Performed by: STUDENT IN AN ORGANIZED HEALTH CARE EDUCATION/TRAINING PROGRAM

## 2024-05-23 PROCEDURE — 3078F DIAST BP <80 MM HG: CPT | Performed by: STUDENT IN AN ORGANIZED HEALTH CARE EDUCATION/TRAINING PROGRAM

## 2024-05-23 PROCEDURE — 3052F HG A1C>EQUAL 8.0%<EQUAL 9.0%: CPT | Performed by: STUDENT IN AN ORGANIZED HEALTH CARE EDUCATION/TRAINING PROGRAM

## 2024-05-23 PROCEDURE — 3017F COLORECTAL CA SCREEN DOC REV: CPT | Performed by: STUDENT IN AN ORGANIZED HEALTH CARE EDUCATION/TRAINING PROGRAM

## 2024-05-23 PROCEDURE — G0296 VISIT TO DETERM LDCT ELIG: HCPCS | Performed by: STUDENT IN AN ORGANIZED HEALTH CARE EDUCATION/TRAINING PROGRAM

## 2024-05-23 PROCEDURE — G8427 DOCREV CUR MEDS BY ELIG CLIN: HCPCS | Performed by: STUDENT IN AN ORGANIZED HEALTH CARE EDUCATION/TRAINING PROGRAM

## 2024-05-23 PROCEDURE — 4004F PT TOBACCO SCREEN RCVD TLK: CPT | Performed by: STUDENT IN AN ORGANIZED HEALTH CARE EDUCATION/TRAINING PROGRAM

## 2024-05-23 PROCEDURE — G8417 CALC BMI ABV UP PARAM F/U: HCPCS | Performed by: STUDENT IN AN ORGANIZED HEALTH CARE EDUCATION/TRAINING PROGRAM

## 2024-05-23 ASSESSMENT — PATIENT HEALTH QUESTIONNAIRE - PHQ9
6. FEELING BAD ABOUT YOURSELF - OR THAT YOU ARE A FAILURE OR HAVE LET YOURSELF OR YOUR FAMILY DOWN: NOT AT ALL
SUM OF ALL RESPONSES TO PHQ QUESTIONS 1-9: 10
4. FEELING TIRED OR HAVING LITTLE ENERGY: NEARLY EVERY DAY
SUM OF ALL RESPONSES TO PHQ QUESTIONS 1-9: 10
9. THOUGHTS THAT YOU WOULD BE BETTER OFF DEAD, OR OF HURTING YOURSELF: NOT AT ALL
10. IF YOU CHECKED OFF ANY PROBLEMS, HOW DIFFICULT HAVE THESE PROBLEMS MADE IT FOR YOU TO DO YOUR WORK, TAKE CARE OF THINGS AT HOME, OR GET ALONG WITH OTHER PEOPLE: SOMEWHAT DIFFICULT
3. TROUBLE FALLING OR STAYING ASLEEP: SEVERAL DAYS
SUM OF ALL RESPONSES TO PHQ QUESTIONS 1-9: 10
2. FEELING DOWN, DEPRESSED OR HOPELESS: SEVERAL DAYS
1. LITTLE INTEREST OR PLEASURE IN DOING THINGS: NEARLY EVERY DAY
7. TROUBLE CONCENTRATING ON THINGS, SUCH AS READING THE NEWSPAPER OR WATCHING TELEVISION: SEVERAL DAYS
5. POOR APPETITE OR OVEREATING: SEVERAL DAYS
SUM OF ALL RESPONSES TO PHQ9 QUESTIONS 1 & 2: 4
8. MOVING OR SPEAKING SO SLOWLY THAT OTHER PEOPLE COULD HAVE NOTICED. OR THE OPPOSITE, BEING SO FIGETY OR RESTLESS THAT YOU HAVE BEEN MOVING AROUND A LOT MORE THAN USUAL: NOT AT ALL
SUM OF ALL RESPONSES TO PHQ QUESTIONS 1-9: 10

## 2024-05-23 NOTE — PROGRESS NOTES
Juliette Thomas is a.58 y.o. female coming into clinic regarding to establish care with me    Type 2 diabetes:   HbA1c is 8.9 today improved from 9.7 in 2023  Patient is taking Jardiance 25 mg and Rybelsus 3 mg daily  is not using leana that was given in the past to get blood sugar  Due for blood work  Advised to follow-up with ophthalmology  is on Lyrica for diabetic neuropathy  Is not using leana because of skin irritation    Chief Complaint   Patient presents with   • Established New Doctor     Smokes 1 ppd x25 years   She quit in 2024    History:  Past Medical History:   Diagnosis Date   • Anxiety 2017   • Chronic back pain    • COPD (chronic obstructive pulmonary disease) ([facility])    • Fibromyalgia 3/12/2019   • GERD (gastroesophageal reflux disease)     resolved not taking medds   • HSV-2 (herpes simplex virus 2) infection 2013   • Hyperlipidemia    • Hypertension    • Neuropathy     feet   • Obesity    • CHARLES (obstructive sleep apnea) 2021   • Osteoarthritis    • Post-traumatic osteoarthritis of left hand    • Type II or unspecified type diabetes mellitus without mention of complication, not stated as uncontrolled    • Uncontrolled type 2 diabetes mellitus without complication, without long-term current use of insulin 2019   • Urinary incontinence      Past Surgical History:   Procedure Laterality Date   • TONSILLECTOMY AND ADENOIDECTOMY     • TUBAL LIGATION       Family History   Problem Relation Age of Onset   • Diabetes Mother    • Hypertension Mother    • Cancer Father 75        rectal cancer   • Breast Cancer Sister 25        had a double mastectomy   • Diabetes Maternal Grandmother    • Colon Cancer Neg Hx    • Eclampsia Neg Hx    • Ovarian Cancer Neg Hx    •  Labor Neg Hx    • Spont Abortions Neg Hx    • Stroke Neg Hx       Social History     Socioeconomic History   • Marital status: Single     Spouse name: None   • Number of children: None   • Years of

## 2024-05-25 ENCOUNTER — TELEPHONE (OUTPATIENT)
Dept: INTERNAL MEDICINE CLINIC | Age: 59
End: 2024-05-25

## 2024-05-25 DIAGNOSIS — E11.65 TYPE 2 DIABETES MELLITUS WITH HYPERGLYCEMIA, WITHOUT LONG-TERM CURRENT USE OF INSULIN (HCC): ICD-10-CM

## 2024-05-25 RX ORDER — EMPAGLIFLOZIN 25 MG/1
25 TABLET, FILM COATED ORAL DAILY
Qty: 30 TABLET | Refills: 0 | Status: SHIPPED | OUTPATIENT
Start: 2024-05-25

## 2024-05-25 NOTE — TELEPHONE ENCOUNTER
Notified by [employer] that patient requesting diabetic medications. She was last seen by Dr. Pollard two days ago. I called the patient but there was no answer. I refilled her jardiance per last encounter medications.

## 2024-05-25 NOTE — TELEPHONE ENCOUNTER
Received second notification from [employer] stating \"The Rx sent is not what we requested. Pt needs rybelsus\".     Chart again reviewed patient is not on this medication per last encounter. She can follow up with pcp on Monday for new medication    I am not able to respond to [employer] to notify them. I again called the patient and there was no answer.

## 2024-05-28 RX ORDER — ORAL SEMAGLUTIDE 3 MG/1
1 TABLET ORAL DAILY
Qty: 30 TABLET | Refills: 0 | Status: SHIPPED | OUTPATIENT
Start: 2024-05-28 | End: 2024-05-29 | Stop reason: DRUGHIGH

## 2024-05-29 ENCOUNTER — TELEPHONE (OUTPATIENT)
Dept: INTERNAL MEDICINE | Age: 59
End: 2024-05-29

## 2024-05-29 DIAGNOSIS — E11.65 TYPE 2 DIABETES MELLITUS WITH HYPERGLYCEMIA, WITHOUT LONG-TERM CURRENT USE OF INSULIN (HCC): ICD-10-CM

## 2024-05-29 PROBLEM — F17.200 SMOKER: Status: ACTIVE | Noted: 2024-05-29

## 2024-05-29 PROBLEM — R06.2 SYMPTOM OF WHEEZING: Status: RESOLVED | Noted: 2020-09-30 | Resolved: 2024-05-29

## 2024-05-29 PROBLEM — R10.819 ABDOMINAL TENDERNESS: Status: RESOLVED | Noted: 2020-09-30 | Resolved: 2024-05-29

## 2024-05-29 RX ORDER — ORAL SEMAGLUTIDE 7 MG/1
1 TABLET ORAL DAILY
Qty: 90 TABLET | Refills: 0 | Status: SHIPPED | OUTPATIENT
Start: 2024-05-29

## 2024-05-29 ASSESSMENT — ENCOUNTER SYMPTOMS
VOMITING: 0
DIARRHEA: 0
BLOOD IN STOOL: 0
ABDOMINAL PAIN: 0
CONSTIPATION: 0
CHEST TIGHTNESS: 0
COUGH: 0
WHEEZING: 0
NAUSEA: 0
SHORTNESS OF BREATH: 0

## 2024-05-29 NOTE — TELEPHONE ENCOUNTER
Patient called question chronic yeast infections. Patient states she had been going to OB/GYN due to this problem but she states that they told her it was Primary Care issue. Now patient is upset because the Gynecology and Primary are not discussing this and finding the reason for the chronic yeast infection.    Patient states after reading the side effects of Jardiance that she believes this si the reason for her chronic yeast infections. Patient states she has taken herself off her Diabetic medication in the past like Metformin and has stopped Jardiance and is only taking Rybelsus per patient.    Patient would like to know what next steps are to treat this chronic yeast infection?    Please Advise.

## 2024-05-29 NOTE — PROGRESS NOTES
Spoke to patient and discussed in detail.    she is having recurrent yeast infection  She is on Jardiance 25 mg and Rybelsus 3 mg  She stopped taking Jardiance on Friday as she learned it can cause yeast infection  Will stop Jardiance  Will increase Rybelsus to 7 mg daily

## 2024-06-13 DIAGNOSIS — E11.65 TYPE 2 DIABETES MELLITUS WITH HYPERGLYCEMIA, WITHOUT LONG-TERM CURRENT USE OF INSULIN (HCC): ICD-10-CM

## 2024-06-13 DIAGNOSIS — R05.3 CHRONIC COUGH: ICD-10-CM

## 2024-06-13 DIAGNOSIS — E78.2 MIXED HYPERLIPIDEMIA: ICD-10-CM

## 2024-06-13 DIAGNOSIS — F17.200 SMOKER: ICD-10-CM

## 2024-06-14 NOTE — TELEPHONE ENCOUNTER
Juliette Thomas is calling to request a refill on the following medication(s):    Medication Request:  Requested Prescriptions     Pending Prescriptions Disp Refills    RYBELSUS 3 MG TABS [Pharmacy Med Name: Rybelsus 3 MG Oral Tablet] 30 tablet 0     Sig: Take 1 tablet by mouth once daily    JARDIANCE 25 MG tablet [Pharmacy Med Name: Jardiance 25 MG Oral Tablet] 30 tablet 0     Sig: Take 1 tablet by mouth once daily    albuterol sulfate HFA (PROVENTIL;VENTOLIN;PROAIR) 108 (90 Base) MCG/ACT inhaler [Pharmacy Med Name: Albuterol Sulfate  (90 Base) MCG/ACT Inhalation Aerosol Solution] 18 g 0     Sig: INHALE 2 PUFFS BY MOUTH 4 TIMES DAILY AS NEEDED FOR WHEEZING    RYBELSUS 7 MG TABS [Pharmacy Med Name: Rybelsus 7 MG Oral Tablet] 90 tablet 0     Sig: Take 1 tablet by mouth once daily    atorvastatin (LIPITOR) 20 MG tablet [Pharmacy Med Name: Atorvastatin Calcium 20 MG Oral Tablet] 90 tablet 0     Sig: Take 1 tablet by mouth once daily       Last Visit Date (If Applicable):  5/23/2024    Next Visit Date:    Visit date not found

## 2024-06-19 RX ORDER — ATORVASTATIN CALCIUM 20 MG/1
20 TABLET, FILM COATED ORAL DAILY
Qty: 90 TABLET | Refills: 0 | Status: SHIPPED | OUTPATIENT
Start: 2024-06-19

## 2024-06-19 RX ORDER — ORAL SEMAGLUTIDE 7 MG/1
1 TABLET ORAL DAILY
Qty: 90 TABLET | Refills: 0 | Status: SHIPPED | OUTPATIENT
Start: 2024-06-19

## 2024-06-19 RX ORDER — EMPAGLIFLOZIN 25 MG/1
25 TABLET, FILM COATED ORAL DAILY
Qty: 30 TABLET | Refills: 0 | Status: SHIPPED | OUTPATIENT
Start: 2024-06-19

## 2024-06-19 RX ORDER — ORAL SEMAGLUTIDE 3 MG/1
1 TABLET ORAL DAILY
Qty: 30 TABLET | Refills: 0 | OUTPATIENT
Start: 2024-06-19

## 2024-06-19 RX ORDER — ALBUTEROL SULFATE 90 UG/1
AEROSOL, METERED RESPIRATORY (INHALATION)
Qty: 18 G | Refills: 0 | Status: SHIPPED | OUTPATIENT
Start: 2024-06-19

## 2024-07-29 DIAGNOSIS — R05.3 CHRONIC COUGH: ICD-10-CM

## 2024-07-29 DIAGNOSIS — F17.200 SMOKER: ICD-10-CM

## 2024-07-29 RX ORDER — ALBUTEROL SULFATE 90 UG/1
AEROSOL, METERED RESPIRATORY (INHALATION)
Qty: 18 G | Refills: 0 | Status: SHIPPED | OUTPATIENT
Start: 2024-07-29

## 2024-07-29 NOTE — TELEPHONE ENCOUNTER
..Request for   Requested Prescriptions     Pending Prescriptions Disp Refills    albuterol sulfate HFA (PROVENTIL;VENTOLIN;PROAIR) 108 (90 Base) MCG/ACT inhaler [Pharmacy Med Name: Albuterol Sulfate  (90 Base) MCG/ACT Inhalation Aerosol Solution] 18 g 0     Sig: INHALE 2 PUFFS BY MOUTH 4 TIMES DAILY AS NEEDED FOR WHEEZING    .      Please review and e-scribe to pharmacy listed in chart if appropriate. Thank you.      Last Visit Date: 5/23/2024  Next Visit Date: Visit date not found    No future appointments.    Health Maintenance   Topic Date Due    Diabetic retinal exam  04/13/2022    Breast cancer screen  08/17/2023    Colorectal Cancer Screen  08/19/2023    Diabetic Alb to Cr ratio (uACR) test  10/26/2023    Lipids  10/26/2023    GFR test (Diabetes, CKD 3-4, OR last GFR 15-59)  10/26/2023    Annual Wellness Visit (Medicare Advantage)  Never done    Diabetic foot exam  06/07/2024    Hepatitis B vaccine (1 of 3 - 3-dose series) 05/23/2025 (Originally 1965)    Shingles vaccine (1 of 2) 05/23/2025 (Originally 6/28/2017)    Pneumococcal 0-64 years Vaccine (2 of 2 - PCV) 05/23/2025 (Originally 7/14/2018)    COVID-19 Vaccine (1) 05/23/2025 (Originally 1965)    Flu vaccine (1) 08/01/2024    A1C test (Diabetic or Prediabetic)  05/23/2025    Depression Monitoring  05/23/2025    Cervical cancer screen  10/26/2027    DTaP/Tdap/Td vaccine (2 - Td or Tdap) 11/30/2027    Hepatitis C screen  Completed    HIV screen  Completed    Hepatitis A vaccine  Aged Out    Hib vaccine  Aged Out    Polio vaccine  Aged Out    Meningococcal (ACWY) vaccine  Aged Out    Lung Cancer Screening &/or Counseling  Discontinued       Hemoglobin A1C (%)   Date Value   05/23/2024 8.9   10/03/2023 9.7 (H)   05/03/2023 13.2 (H)             ( goal A1C is < 7)   No components found for: \"LABMICR\"  No components found for: \"LDLCHOLESTEROL\", \"LDLCALC\"    (goal LDL is <100)   AST (U/L)   Date Value   10/26/2022 23     ALT (U/L)   Date Value

## 2024-08-23 DIAGNOSIS — F17.200 SMOKER: ICD-10-CM

## 2024-08-23 DIAGNOSIS — R05.3 CHRONIC COUGH: ICD-10-CM

## 2024-08-23 NOTE — TELEPHONE ENCOUNTER
..Request for   Requested Prescriptions     Pending Prescriptions Disp Refills    VENTOLIN  (90 Base) MCG/ACT inhaler [Pharmacy Med Name: Ventolin  (90 Base) MCG/ACT Inhalation Aerosol Solution] 18 g 0     Sig: INHALE 2 PUFFS BY MOUTH 4 TIMES DAILY AS NEEDED FOR WHEEZING    .      Please review and e-scribe to pharmacy listed in chart if appropriate. Thank you.      Last Visit Date: 5/23/2024  Next Visit Date: Visit date not found    No future appointments.    Health Maintenance   Topic Date Due    Diabetic retinal exam  04/13/2022    Breast cancer screen  08/17/2023    Colorectal Cancer Screen  08/19/2023    Diabetic Alb to Cr ratio (uACR) test  10/26/2023    Lipids  10/26/2023    GFR test (Diabetes, CKD 3-4, OR last GFR 15-59)  10/26/2023    Annual Wellness Visit (Medicare Advantage)  Never done    Diabetic foot exam  06/07/2024    Flu vaccine (1) Never done    Hepatitis B vaccine (1 of 3 - 19+ 3-dose series) 05/23/2025 (Originally 6/28/1986)    Shingles vaccine (1 of 2) 05/23/2025 (Originally 6/28/2017)    Pneumococcal 0-64 years Vaccine (2 of 2 - PCV) 05/23/2025 (Originally 7/14/2018)    COVID-19 Vaccine (1 - 2023-24 season) 05/23/2025 (Originally 9/1/2023)    A1C test (Diabetic or Prediabetic)  05/23/2025    Depression Monitoring  05/23/2025    Cervical cancer screen  10/26/2027    DTaP/Tdap/Td vaccine (2 - Td or Tdap) 11/30/2027    Hepatitis C screen  Completed    HIV screen  Completed    Hepatitis A vaccine  Aged Out    Hib vaccine  Aged Out    Polio vaccine  Aged Out    Meningococcal (ACWY) vaccine  Aged Out    Lung Cancer Screening &/or Counseling  Discontinued       Hemoglobin A1C (%)   Date Value   05/23/2024 8.9   10/03/2023 9.7 (H)   05/03/2023 13.2 (H)             ( goal A1C is < 7)   No components found for: \"LABMICR\"  No components found for: \"LDLCHOLESTEROL\", \"LDLCALC\"    (goal LDL is <100)   AST (U/L)   Date Value   10/26/2022 23     ALT (U/L)   Date Value   10/26/2022 28     BUN

## 2024-08-26 RX ORDER — ALBUTEROL SULFATE 90 UG/1
AEROSOL, METERED RESPIRATORY (INHALATION)
Qty: 18 G | Refills: 0 | Status: SHIPPED | OUTPATIENT
Start: 2024-08-26

## 2024-09-30 DIAGNOSIS — F17.200 SMOKER: ICD-10-CM

## 2024-09-30 DIAGNOSIS — R05.3 CHRONIC COUGH: ICD-10-CM

## 2024-10-01 RX ORDER — ALBUTEROL SULFATE 90 UG/1
AEROSOL, METERED RESPIRATORY (INHALATION)
Qty: 18 G | Refills: 0 | Status: SHIPPED | OUTPATIENT
Start: 2024-10-01

## 2024-10-01 NOTE — TELEPHONE ENCOUNTER
Juliette Thomas is calling to request a refill on the following medication(s):    Medication Request:  Requested Prescriptions     Pending Prescriptions Disp Refills    VENTOLIN  (90 Base) MCG/ACT inhaler [Pharmacy Med Name: Ventolin  (90 Base) MCG/ACT Inhalation Aerosol Solution] 18 g 0     Sig: INHALE 2 PUFFS BY MOUTH 4 TIMES DAILY AS NEEDED FOR WHEEZING       Last Visit Date (If Applicable):  5/23/2024    Next Visit Date:    10/1/2024

## 2024-10-28 DIAGNOSIS — F17.200 SMOKER: ICD-10-CM

## 2024-10-28 DIAGNOSIS — R05.3 CHRONIC COUGH: ICD-10-CM

## 2024-10-28 NOTE — TELEPHONE ENCOUNTER
..Request for   Requested Prescriptions     Pending Prescriptions Disp Refills    VENTOLIN  (90 Base) MCG/ACT inhaler [Pharmacy Med Name: Ventolin  (90 Base) MCG/ACT Inhalation Aerosol Solution] 18 g 0     Sig: INHALE 2 PUFFS BY MOUTH 4 TIMES DAILY AS NEEDED FOR WHEEZING    .      Please review and e-scribe to pharmacy listed in chart if appropriate. Thank you.      Last Visit Date: 5/23/2024  Next Visit Date: Visit date not found    No future appointments.    Health Maintenance   Topic Date Due    Diabetic retinal exam  04/13/2022    Breast cancer screen  08/17/2023    Colorectal Cancer Screen  08/19/2023    Diabetic Alb to Cr ratio (uACR) test  10/26/2023    Lipids  10/26/2023    GFR test (Diabetes, CKD 3-4, OR last GFR 15-59)  10/26/2023    Annual Wellness Visit (Medicare Advantage)  Never done    Diabetic foot exam  06/07/2024    Flu vaccine (1) Never done    COVID-19 Vaccine (1 - 2023-24 season) Never done    Hepatitis B vaccine (1 of 3 - 19+ 3-dose series) 05/23/2025 (Originally 6/28/1986)    Shingles vaccine (1 of 2) 05/23/2025 (Originally 6/28/2017)    Pneumococcal 0-64 years Vaccine (2 of 2 - PCV) 05/23/2025 (Originally 7/14/2018)    A1C test (Diabetic or Prediabetic)  05/23/2025    Depression Monitoring  05/23/2025    Cervical cancer screen  10/26/2027    DTaP/Tdap/Td vaccine (2 - Td or Tdap) 11/30/2027    Hepatitis C screen  Completed    HIV screen  Completed    Hepatitis A vaccine  Aged Out    Hib vaccine  Aged Out    Polio vaccine  Aged Out    Meningococcal (ACWY) vaccine  Aged Out    Lung Cancer Screening &/or Counseling  Discontinued       Hemoglobin A1C (%)   Date Value   05/23/2024 8.9   10/03/2023 9.7 (H)   05/03/2023 13.2 (H)             ( goal A1C is < 7)   No components found for: \"LABMICR\"  No components found for: \"LDLCHOLESTEROL\", \"LDLCALC\"    (goal LDL is <100)   AST (U/L)   Date Value   10/26/2022 23     ALT (U/L)   Date Value   10/26/2022 28     BUN (mg/dL)   Date Value

## 2024-11-01 RX ORDER — ALBUTEROL SULFATE 90 UG/1
AEROSOL, METERED RESPIRATORY (INHALATION)
Qty: 18 G | Refills: 0 | Status: SHIPPED | OUTPATIENT
Start: 2024-11-01

## 2024-11-11 ENCOUNTER — TELEPHONE (OUTPATIENT)
Dept: INTERNAL MEDICINE | Age: 59
End: 2024-11-11

## 2024-11-20 DIAGNOSIS — E11.65 TYPE 2 DIABETES MELLITUS WITH HYPERGLYCEMIA, WITHOUT LONG-TERM CURRENT USE OF INSULIN (HCC): ICD-10-CM

## 2024-11-25 DIAGNOSIS — F17.200 SMOKER: ICD-10-CM

## 2024-11-25 DIAGNOSIS — R05.3 CHRONIC COUGH: ICD-10-CM

## 2024-11-26 RX ORDER — ALBUTEROL SULFATE 90 UG/1
AEROSOL, METERED RESPIRATORY (INHALATION)
Qty: 18 G | Refills: 0 | Status: SHIPPED | OUTPATIENT
Start: 2024-11-26

## 2024-11-26 NOTE — TELEPHONE ENCOUNTER
Juliette Thomas is calling to request a refill on the following medication(s):    Medication Request:  Requested Prescriptions     Pending Prescriptions Disp Refills    VENTOLIN  (90 Base) MCG/ACT inhaler [Pharmacy Med Name: Ventolin  (90 Base) MCG/ACT Inhalation Aerosol Solution] 18 g 0     Sig: INHALE 2 PUFFS BY MOUTH 4 TIMES DAILY AS NEEDED FOR WHEEZING       Last Visit Date (If Applicable):  5/23/2024    Next Visit Date:    Visit date not found

## 2024-12-05 ENCOUNTER — TELEPHONE (OUTPATIENT)
Dept: INTERNAL MEDICINE | Age: 59
End: 2024-12-05

## 2024-12-09 DIAGNOSIS — E78.2 MIXED HYPERLIPIDEMIA: ICD-10-CM

## 2024-12-10 RX ORDER — ATORVASTATIN CALCIUM 20 MG/1
20 TABLET, FILM COATED ORAL DAILY
Qty: 90 TABLET | Refills: 0 | Status: SHIPPED | OUTPATIENT
Start: 2024-12-10

## 2024-12-10 NOTE — TELEPHONE ENCOUNTER
Juliette Thomas is calling to request a refill on the following medication(s):    Medication Request:  Requested Prescriptions     Pending Prescriptions Disp Refills    atorvastatin (LIPITOR) 20 MG tablet [Pharmacy Med Name: Atorvastatin Calcium 20 MG Oral Tablet] 90 tablet 0     Sig: Take 1 tablet by mouth once daily       Last Visit Date (If Applicable):  5/23/2024    Next Visit Date:    Visit date not found

## 2024-12-29 DIAGNOSIS — F17.200 SMOKER: ICD-10-CM

## 2024-12-29 DIAGNOSIS — R05.3 CHRONIC COUGH: ICD-10-CM

## 2024-12-30 NOTE — TELEPHONE ENCOUNTER
Juliette Thomas is calling to request a refill on the following medication(s):    Medication Request:  Requested Prescriptions     Pending Prescriptions Disp Refills    VENTOLIN  (90 Base) MCG/ACT inhaler [Pharmacy Med Name: Ventolin  (90 Base) MCG/ACT Inhalation Aerosol Solution] 18 g 0     Sig: INHALE 2 PUFFS BY MOUTH 4 TIMES DAILY AS NEEDED FOR WHEEZING       Last Visit Date (If Applicable):  5/23/2024    Next Visit Date:    Visit date not found   Patient has histoplasmosis.  Recommend CT scan following completion of therapy itraconazole

## 2024-12-31 DIAGNOSIS — E11.65 TYPE 2 DIABETES MELLITUS WITH HYPERGLYCEMIA, WITHOUT LONG-TERM CURRENT USE OF INSULIN (HCC): ICD-10-CM

## 2024-12-31 RX ORDER — ORAL SEMAGLUTIDE 7 MG/1
1 TABLET ORAL DAILY
Qty: 90 TABLET | Refills: 0 | OUTPATIENT
Start: 2024-12-31

## 2024-12-31 NOTE — TELEPHONE ENCOUNTER
Patient is completely out original refill sent 11-20-24.          Juliette Thomas is calling to request a refill on the following medication(s):    Medication Request:  Requested Prescriptions     Pending Prescriptions Disp Refills    Semaglutide (RYBELSUS) 7 MG TABS 90 tablet 0     Sig: Take 1 tablet by mouth daily       Last Visit Date (If Applicable):  5/23/2024    Next Visit Date:    Visit date not found

## 2025-01-02 NOTE — TELEPHONE ENCOUNTER
Juliette Thomas is calling to request a refill on the following medication(s):    Medication Request:  Requested Prescriptions     Pending Prescriptions Disp Refills    RYBELSUS 7 MG TABS [Pharmacy Med Name: Rybelsus 7 MG Oral Tablet] 90 tablet 0     Sig: Take 1 tablet by mouth once daily       Last Visit Date (If Applicable):  5/23/2024    Next Visit Date:    Visit date not found

## 2025-01-03 DIAGNOSIS — E11.65 TYPE 2 DIABETES MELLITUS WITH HYPERGLYCEMIA, WITHOUT LONG-TERM CURRENT USE OF INSULIN (HCC): ICD-10-CM

## 2025-01-07 RX ORDER — ORAL SEMAGLUTIDE 7 MG/1
1 TABLET ORAL DAILY
Qty: 90 TABLET | Refills: 1 | Status: SHIPPED | OUTPATIENT
Start: 2025-01-07 | End: 2025-01-10 | Stop reason: SDUPTHER

## 2025-01-07 NOTE — TELEPHONE ENCOUNTER
Medicine pending since 12/31/24. Pt calling angry and says she is completely out of medication. Dr. Green did not address on her week of Dr. Pollard coverage. Please advise

## 2025-01-10 RX ORDER — ORAL SEMAGLUTIDE 7 MG/1
1 TABLET ORAL DAILY
Qty: 90 TABLET | Refills: 0 | OUTPATIENT
Start: 2025-01-10

## 2025-01-10 RX ORDER — ALBUTEROL SULFATE 90 UG/1
AEROSOL, METERED RESPIRATORY (INHALATION)
Qty: 18 G | Refills: 0 | Status: SHIPPED | OUTPATIENT
Start: 2025-01-10

## 2025-01-10 RX ORDER — ORAL SEMAGLUTIDE 7 MG/1
1 TABLET ORAL DAILY
Qty: 90 TABLET | Refills: 0 | Status: SHIPPED | OUTPATIENT
Start: 2025-01-10

## 2025-02-03 DIAGNOSIS — R05.3 CHRONIC COUGH: ICD-10-CM

## 2025-02-03 DIAGNOSIS — F17.200 SMOKER: ICD-10-CM

## 2025-02-03 NOTE — TELEPHONE ENCOUNTER
..Request for   Requested Prescriptions     Pending Prescriptions Disp Refills    VENTOLIN  (90 Base) MCG/ACT inhaler [Pharmacy Med Name: Ventolin  (90 Base) MCG/ACT Inhalation Aerosol Solution] 18 g 0     Sig: INHALE 2 PUFFS BY MOUTH 4 TIMES DAILY AS NEEDED FOR WHEEZING    .      Please review and e-scribe to pharmacy listed in chart if appropriate. Thank you.      Last Visit Date: 5/23/2024  Next Visit Date: Visit date not found    No future appointments.    Health Maintenance   Topic Date Due    Diabetic retinal exam  04/13/2022    Lung Cancer Screening &/or Counseling  09/13/2022    Breast cancer screen  08/17/2023    Colorectal Cancer Screen  08/19/2023    Diabetic Alb to Cr ratio (uACR) test  10/26/2023    Lipids  10/26/2023    GFR test (Diabetes, CKD 3-4, OR last GFR 15-59)  10/26/2023    Diabetic foot exam  06/07/2024    Flu vaccine (1) Never done    COVID-19 Vaccine (1 - 2023-24 season) Never done    Annual Wellness Visit (Medicare Advantage)  Never done    Hepatitis B vaccine (1 of 3 - 19+ 3-dose series) 05/23/2025 (Originally 6/28/1986)    Shingles vaccine (1 of 2) 05/23/2025 (Originally 6/28/2017)    Pneumococcal 0-64 years Vaccine (2 of 2 - PCV) 05/23/2025 (Originally 7/14/2018)    A1C test (Diabetic or Prediabetic)  05/23/2025    Depression Monitoring  05/23/2025    Cervical cancer screen  10/26/2027    DTaP/Tdap/Td vaccine (2 - Td or Tdap) 11/30/2027    Hepatitis C screen  Completed    HIV screen  Completed    Hepatitis A vaccine  Aged Out    Hib vaccine  Aged Out    Polio vaccine  Aged Out    Meningococcal (ACWY) vaccine  Aged Out       Hemoglobin A1C (%)   Date Value   05/23/2024 8.9   10/03/2023 9.7 (H)   05/03/2023 13.2 (H)             ( goal A1C is < 7)   No components found for: \"LABMICR\"  No components found for: \"LDLCHOLESTEROL\", \"LDLCALC\"    (goal LDL is <100)   AST (U/L)   Date Value   10/26/2022 23     ALT (U/L)   Date Value   10/26/2022 28     BUN (mg/dL)   Date Value

## 2025-02-07 RX ORDER — ALBUTEROL SULFATE 90 UG/1
AEROSOL, METERED RESPIRATORY (INHALATION)
Qty: 18 G | Refills: 0 | Status: SHIPPED | OUTPATIENT
Start: 2025-02-07

## 2025-03-03 DIAGNOSIS — F17.200 SMOKER: ICD-10-CM

## 2025-03-03 DIAGNOSIS — R05.3 CHRONIC COUGH: ICD-10-CM

## 2025-03-03 RX ORDER — ALBUTEROL SULFATE 90 UG/1
AEROSOL, METERED RESPIRATORY (INHALATION)
Qty: 18 G | Refills: 0 | Status: SHIPPED | OUTPATIENT
Start: 2025-03-03

## 2025-03-10 ENCOUNTER — TELEPHONE (OUTPATIENT)
Dept: INTERNAL MEDICINE | Age: 60
End: 2025-03-10

## 2025-04-01 DIAGNOSIS — R05.3 CHRONIC COUGH: ICD-10-CM

## 2025-04-01 DIAGNOSIS — F17.200 SMOKER: ICD-10-CM

## 2025-04-01 RX ORDER — ALBUTEROL SULFATE 90 UG/1
AEROSOL, METERED RESPIRATORY (INHALATION)
Qty: 18 G | Refills: 0 | Status: SHIPPED | OUTPATIENT
Start: 2025-04-01

## 2025-04-30 DIAGNOSIS — F17.200 SMOKER: ICD-10-CM

## 2025-04-30 DIAGNOSIS — R05.3 CHRONIC COUGH: ICD-10-CM

## 2025-05-01 NOTE — TELEPHONE ENCOUNTER
Juliette Thomas is calling to request a refill on the following medication(s):    Medication Request:  Requested Prescriptions     Pending Prescriptions Disp Refills    VENTOLIN  (90 Base) MCG/ACT inhaler [Pharmacy Med Name: Ventolin  (90 Base) MCG/ACT Inhalation Aerosol Solution] 18 g 0     Sig: INHALE 2 PUFFS BY MOUTH 4 TIMES DAILY AS NEEDED FOR WHEEZING       Last Visit Date (If Applicable):  Visit date not found    Next Visit Date:    Visit date not found

## 2025-05-02 RX ORDER — ALBUTEROL SULFATE 90 UG/1
AEROSOL, METERED RESPIRATORY (INHALATION)
Qty: 18 G | Refills: 0 | Status: SHIPPED | OUTPATIENT
Start: 2025-05-02

## 2025-05-29 DIAGNOSIS — R05.3 CHRONIC COUGH: ICD-10-CM

## 2025-05-29 DIAGNOSIS — F17.200 SMOKER: ICD-10-CM

## 2025-05-30 RX ORDER — ALBUTEROL SULFATE 90 UG/1
AEROSOL, METERED RESPIRATORY (INHALATION)
Qty: 18 G | Refills: 0 | Status: SHIPPED | OUTPATIENT
Start: 2025-05-30

## 2025-06-03 ENCOUNTER — OFFICE VISIT (OUTPATIENT)
Age: 60
End: 2025-06-03
Payer: MEDICARE

## 2025-06-03 ENCOUNTER — HOSPITAL ENCOUNTER (OUTPATIENT)
Age: 60
Setting detail: SPECIMEN
Discharge: HOME | End: 2025-06-03

## 2025-06-03 VITALS
HEIGHT: 63 IN | WEIGHT: 202 LBS | DIASTOLIC BLOOD PRESSURE: 79 MMHG | BODY MASS INDEX: 35.79 KG/M2 | SYSTOLIC BLOOD PRESSURE: 142 MMHG | HEART RATE: 79 BPM

## 2025-06-03 DIAGNOSIS — N95.8 GENITOURINARY SYNDROME OF MENOPAUSE: ICD-10-CM

## 2025-06-03 DIAGNOSIS — N95.0 POSTMENOPAUSAL BLEEDING: Primary | ICD-10-CM

## 2025-06-03 DIAGNOSIS — N89.8 VAGINAL DISCHARGE: ICD-10-CM

## 2025-06-03 PROCEDURE — 3017F COLORECTAL CA SCREEN DOC REV: CPT

## 2025-06-03 PROCEDURE — 4004F PT TOBACCO SCREEN RCVD TLK: CPT

## 2025-06-03 PROCEDURE — 99213 OFFICE O/P EST LOW 20 MIN: CPT

## 2025-06-03 PROCEDURE — 3077F SYST BP >= 140 MM HG: CPT

## 2025-06-03 PROCEDURE — 3078F DIAST BP <80 MM HG: CPT

## 2025-06-03 PROCEDURE — G8417 CALC BMI ABV UP PARAM F/U: HCPCS

## 2025-06-03 PROCEDURE — G8427 DOCREV CUR MEDS BY ELIG CLIN: HCPCS

## 2025-06-03 RX ORDER — ESTRADIOL 0.1 MG/G
CREAM VAGINAL
Qty: 1 EACH | Refills: 3 | Status: SHIPPED | OUTPATIENT
Start: 2025-06-03 | End: 2025-09-15

## 2025-06-03 ASSESSMENT — PATIENT HEALTH QUESTIONNAIRE - PHQ9
1. LITTLE INTEREST OR PLEASURE IN DOING THINGS: NOT AT ALL
SUM OF ALL RESPONSES TO PHQ QUESTIONS 1-9: 0
6. FEELING BAD ABOUT YOURSELF - OR THAT YOU ARE A FAILURE OR HAVE LET YOURSELF OR YOUR FAMILY DOWN: NOT AT ALL
SUM OF ALL RESPONSES TO PHQ QUESTIONS 1-9: 0
SUM OF ALL RESPONSES TO PHQ QUESTIONS 1-9: 0
3. TROUBLE FALLING OR STAYING ASLEEP: NOT AT ALL
4. FEELING TIRED OR HAVING LITTLE ENERGY: NOT AT ALL
10. IF YOU CHECKED OFF ANY PROBLEMS, HOW DIFFICULT HAVE THESE PROBLEMS MADE IT FOR YOU TO DO YOUR WORK, TAKE CARE OF THINGS AT HOME, OR GET ALONG WITH OTHER PEOPLE: NOT DIFFICULT AT ALL
8. MOVING OR SPEAKING SO SLOWLY THAT OTHER PEOPLE COULD HAVE NOTICED. OR THE OPPOSITE, BEING SO FIGETY OR RESTLESS THAT YOU HAVE BEEN MOVING AROUND A LOT MORE THAN USUAL: NOT AT ALL
5. POOR APPETITE OR OVEREATING: NOT AT ALL
SUM OF ALL RESPONSES TO PHQ QUESTIONS 1-9: 0
7. TROUBLE CONCENTRATING ON THINGS, SUCH AS READING THE NEWSPAPER OR WATCHING TELEVISION: NOT AT ALL
2. FEELING DOWN, DEPRESSED OR HOPELESS: NOT AT ALL
9. THOUGHTS THAT YOU WOULD BE BETTER OFF DEAD, OR OF HURTING YOURSELF: NOT AT ALL

## 2025-06-04 DIAGNOSIS — N89.8 VAGINAL DISCHARGE: ICD-10-CM

## 2025-06-04 LAB
C TRACH DNA SPEC QL PROBE+SIG AMP: NEGATIVE
CANDIDA SPECIES: NEGATIVE
GARDNERELLA VAGINALIS: POSITIVE
N GONORRHOEA DNA SPEC QL PROBE+SIG AMP: NEGATIVE
SOURCE: ABNORMAL
SPECIMEN DESCRIPTION: NORMAL
TRICHOMONAS: NEGATIVE

## 2025-06-09 ENCOUNTER — TELEPHONE (OUTPATIENT)
Age: 60
End: 2025-06-09

## 2025-06-10 NOTE — PROGRESS NOTES
Attending Physician Statement  I have discussed the care of Juliette Thomas, including pertinent history and exam findings,  with the resident. I have reviewed the key elements of all parts of the encounter with the resident.  I agree with the assessment, plan and orders as documented by the resident.  (GE Modifier)    Vika Solis, DO   
daily 90 tablet 1    magnesium oxide (MAG-OX) 400 MG tablet Take 1 tablet by mouth daily (Patient not taking: Reported on 6/3/2025) 90 tablet 1    Vitamin D, Cholecalciferol, 10 MCG (400 UNIT) TABS Take 1 tablet by mouth daily (Patient not taking: Reported on 6/3/2025) 90 tablet 1    CLARITIN 10 MG tablet Take 1 tablet by mouth daily (Patient not taking: Reported on 6/3/2025) 30 tablet 5    LAMISIL 250 MG tablet Take 1 tablet by mouth daily (Patient not taking: Reported on 6/3/2025) 84 tablet 0    Continuous Blood Gluc Sensor (FREESTYLE CELI 2 SENSOR) MISC 1 each by Does not apply route every 14 days (Patient not taking: Reported on 6/3/2025) 2 each 11    Continuous Blood Gluc  (FREESTYLE CELI 2 READER) ALBINA 1 each by Does not apply route 4 times daily (before meals and nightly) (Patient not taking: Reported on 6/3/2025) 1 each 0    clobetasol (TEMOVATE) 0.05 % cream Apply twice daily externally to the affected area for 4 weeks (Patient not taking: Reported on 6/3/2025) 1 each 1    aspirin (EQ ASPIRIN ADULT LOW DOSE) 81 MG EC tablet Take 1 tablet by mouth daily (Patient not taking: Reported on 6/3/2025) 90 tablet 1    blood glucose test strips (TRUE METRIX BLOOD GLUCOSE TEST) strip USE 1 STRIP TO CHECK GLUCOSE TWICE DAILY (Patient not taking: Reported on 6/3/2025) 100 strip 11    Lancets MISC USE 1 LANCET TO CHECK GLUCOSE TWICE DAILY (Patient not taking: Reported on 6/3/2025) 100 each 11     No current facility-administered medications for this visit.       ALLERGIES:  Allergies as of 06/03/2025 - Fully Reviewed 06/03/2025   Allergen Reaction Noted    Latex Hives 06/16/2017    Vicodin [hydrocodone-acetaminophen] Hives and Itching 01/22/2013    Jardiance [empagliflozin] Other (See Comments) 06/03/2025    Zocor [simvastatin]  12/13/2011    Lisinopril  12/23/2011    Metformin Other (See Comments) 03/15/2023                                   VITALS:  Vitals:    06/03/25 1517 06/03/25 1522   BP: (!) 150/83 (!)

## 2025-06-13 ENCOUNTER — RESULTS FOLLOW-UP (OUTPATIENT)
Dept: INTERNAL MEDICINE CLINIC | Age: 60
End: 2025-06-13

## 2025-06-13 DIAGNOSIS — N76.0 VAGINOSIS: Primary | ICD-10-CM

## 2025-06-13 RX ORDER — METRONIDAZOLE 500 MG/1
500 TABLET ORAL 2 TIMES DAILY
Qty: 14 TABLET | Refills: 0 | Status: SHIPPED | OUTPATIENT
Start: 2025-06-13 | End: 2025-06-20

## 2025-06-16 DIAGNOSIS — F17.200 SMOKER: ICD-10-CM

## 2025-06-16 DIAGNOSIS — R05.3 CHRONIC COUGH: ICD-10-CM

## 2025-06-16 RX ORDER — ALBUTEROL SULFATE 90 UG/1
AEROSOL, METERED RESPIRATORY (INHALATION)
Qty: 18 G | Refills: 0 | Status: SHIPPED | OUTPATIENT
Start: 2025-06-16

## 2025-06-16 NOTE — TELEPHONE ENCOUNTER
Juliette Thomas is calling to request a refill on the following medication(s):    Medication Request:  Requested Prescriptions     Pending Prescriptions Disp Refills    VENTOLIN  (90 Base) MCG/ACT inhaler [Pharmacy Med Name: Ventolin  (90 Base) MCG/ACT Inhalation Aerosol Solution] 18 g 0     Sig: INHALE 2 PUFFS BY MOUTH 4 TIMES DAILY AS NEEDED FOR WHEEZING       Last Visit Date (If Applicable):  Visit date not found    Next Visit Date:    7/17/2025

## 2025-06-17 ENCOUNTER — HOSPITAL ENCOUNTER (EMERGENCY)
Age: 60
Discharge: HOME | End: 2025-06-17
Attending: EMERGENCY MEDICINE
Payer: MEDICARE

## 2025-06-17 VITALS
OXYGEN SATURATION: 96 % | SYSTOLIC BLOOD PRESSURE: 147 MMHG | TEMPERATURE: 98.4 F | HEIGHT: 63 IN | DIASTOLIC BLOOD PRESSURE: 76 MMHG | BODY MASS INDEX: 35.44 KG/M2 | WEIGHT: 200 LBS | HEART RATE: 68 BPM | RESPIRATION RATE: 18 BRPM

## 2025-06-17 DIAGNOSIS — H00.14 CHALAZION OF LEFT UPPER EYELID: Primary | ICD-10-CM

## 2025-06-17 PROCEDURE — 99283 EMERGENCY DEPT VISIT LOW MDM: CPT

## 2025-06-17 RX ORDER — ERYTHROMYCIN 5 MG/G
OINTMENT OPHTHALMIC
Qty: 3.5 G | Refills: 0 | Status: SHIPPED | OUTPATIENT
Start: 2025-06-17

## 2025-06-17 ASSESSMENT — ENCOUNTER SYMPTOMS
BACK PAIN: 0
SORE THROAT: 0
VOMITING: 0
EYE PAIN: 0
EYE REDNESS: 0
DIARRHEA: 0
COUGH: 0
ABDOMINAL PAIN: 0
EYE DISCHARGE: 1
SHORTNESS OF BREATH: 0

## 2025-06-17 ASSESSMENT — VISUAL ACUITY
OU: 20/25
OU: 1
OD: 20/25
OS: 20/25

## 2025-06-17 NOTE — ED PROVIDER NOTES
[facility] EMERGENCY DEPARTMENT  EMERGENCY MEDICINE     Pt Name: Juliette Thomas  MRN: [id number]  Birthdate 1965  Date of evaluation: 6/17/2025  PCP:    Ysabel Pollard MD  Provider: Efrain Eduardo DO    CHIEF COMPLAINT       Chief Complaint   Patient presents with    Eye Problem     Bilateral at times, states she gets a film over the eyes.       HISTORY OF PRESENT ILLNESS    Patient is a 59-year-old female who presents with bilateral eye drainage and bumps around her eyes.  Patient said that is using warm compresses.  Patient states she just got back from the eye doctor and they did not medically evaluate her because of an insurance problem.  Patient denies any redness around the eyes.  Patient states she gets a haze around the eyes it clears with water at times.  Denies any significant blurred vision.  No other focal neurological problems at this time         Triage notes and Nursing notes were reviewed by myself.  Any discrepancies are addressed above.    PAST MEDICAL HISTORY     Past Medical History:   Diagnosis Date    Anxiety 2/23/2017    Chronic back pain     COPD (chronic obstructive pulmonary disease) (HCC)     Fibromyalgia 3/12/2019    GERD (gastroesophageal reflux disease)     resolved not taking medds    HSV-2 (herpes simplex virus 2) infection 7/2/2013    Hyperlipidemia     Hypertension     Neuropathy     feet    Obesity     CHARLES (obstructive sleep apnea) 2/19/2021    Osteoarthritis     Post-traumatic osteoarthritis of left hand     Type II or unspecified type diabetes mellitus without mention of complication, not stated as uncontrolled     Uncontrolled type 2 diabetes mellitus without complication, without long-term current use of insulin 5/22/2019    Urinary incontinence        SURGICAL HISTORY       Past Surgical History:   Procedure Laterality Date    TONSILLECTOMY AND ADENOIDECTOMY      TUBAL LIGATION         CURRENT MEDICATIONS       Previous Medications    ASPIRIN (EQ ASPIRIN ADULT LOW

## 2025-06-17 NOTE — ED NOTES
Pt to the ED due to an eye problem. Pt states that she has had bilateral eye pain and swelling. Pt states that this has been on going for awhile. Pt continues by stating that it feels like a film covering her eye. Pt is Aox4 and independent. Pt breathing is equal and non-labored. Pt denies any further needs at this time.

## 2025-06-29 DIAGNOSIS — F17.200 SMOKER: ICD-10-CM

## 2025-06-29 DIAGNOSIS — R05.3 CHRONIC COUGH: ICD-10-CM

## 2025-06-30 RX ORDER — ALBUTEROL SULFATE 90 UG/1
AEROSOL, METERED RESPIRATORY (INHALATION)
Qty: 18 G | Refills: 0 | Status: SHIPPED | OUTPATIENT
Start: 2025-06-30

## 2025-07-02 ENCOUNTER — TELEPHONE (OUTPATIENT)
Age: 60
End: 2025-07-02

## 2025-07-02 DIAGNOSIS — B37.31 YEAST INFECTION OF THE VAGINA: Primary | ICD-10-CM

## 2025-07-02 RX ORDER — FLUCONAZOLE 150 MG/1
150 TABLET ORAL ONCE
Qty: 1 TABLET | Refills: 0 | Status: SHIPPED | OUTPATIENT
Start: 2025-07-02 | End: 2025-07-02

## 2025-07-21 DIAGNOSIS — R05.3 CHRONIC COUGH: ICD-10-CM

## 2025-07-21 DIAGNOSIS — F17.200 SMOKER: ICD-10-CM

## 2025-07-21 RX ORDER — ALBUTEROL SULFATE 90 UG/1
AEROSOL, METERED RESPIRATORY (INHALATION)
Qty: 18 G | Refills: 0 | Status: SHIPPED | OUTPATIENT
Start: 2025-07-21

## 2025-08-15 DIAGNOSIS — F17.200 SMOKER: ICD-10-CM

## 2025-08-15 DIAGNOSIS — R05.3 CHRONIC COUGH: ICD-10-CM

## 2025-08-15 RX ORDER — ALBUTEROL SULFATE 90 UG/1
AEROSOL, METERED RESPIRATORY (INHALATION)
Qty: 18 G | Refills: 0 | Status: SHIPPED | OUTPATIENT
Start: 2025-08-15